# Patient Record
Sex: FEMALE | Race: WHITE | NOT HISPANIC OR LATINO
[De-identification: names, ages, dates, MRNs, and addresses within clinical notes are randomized per-mention and may not be internally consistent; named-entity substitution may affect disease eponyms.]

---

## 2019-08-30 ENCOUNTER — APPOINTMENT (OUTPATIENT)
Dept: UROGYNECOLOGY | Facility: CLINIC | Age: 69
End: 2019-08-30
Payer: MEDICARE

## 2019-08-30 VITALS
BODY MASS INDEX: 42.52 KG/M2 | SYSTOLIC BLOOD PRESSURE: 146 MMHG | HEIGHT: 63.75 IN | WEIGHT: 246 LBS | DIASTOLIC BLOOD PRESSURE: 70 MMHG

## 2019-08-30 DIAGNOSIS — R35.0 FREQUENCY OF MICTURITION: ICD-10-CM

## 2019-08-30 DIAGNOSIS — N39.41 URGE INCONTINENCE: ICD-10-CM

## 2019-08-30 DIAGNOSIS — Z86.59 PERSONAL HISTORY OF OTHER MENTAL AND BEHAVIORAL DISORDERS: ICD-10-CM

## 2019-08-30 DIAGNOSIS — Z87.09 PERSONAL HISTORY OF OTHER DISEASES OF THE RESPIRATORY SYSTEM: ICD-10-CM

## 2019-08-30 DIAGNOSIS — Z80.0 FAMILY HISTORY OF MALIGNANT NEOPLASM OF DIGESTIVE ORGANS: ICD-10-CM

## 2019-08-30 DIAGNOSIS — R35.1 NOCTURIA: ICD-10-CM

## 2019-08-30 DIAGNOSIS — Z86.39 PERSONAL HISTORY OF OTHER ENDOCRINE, NUTRITIONAL AND METABOLIC DISEASE: ICD-10-CM

## 2019-08-30 DIAGNOSIS — R39.15 URGENCY OF URINATION: ICD-10-CM

## 2019-08-30 DIAGNOSIS — Z86.69 PERSONAL HISTORY OF OTHER DISEASES OF THE NERVOUS SYSTEM AND SENSE ORGANS: ICD-10-CM

## 2019-08-30 DIAGNOSIS — Z87.19 PERSONAL HISTORY OF OTHER DISEASES OF THE DIGESTIVE SYSTEM: ICD-10-CM

## 2019-08-30 DIAGNOSIS — N39.3 STRESS INCONTINENCE (FEMALE) (MALE): ICD-10-CM

## 2019-08-30 DIAGNOSIS — Z86.018 PERSONAL HISTORY OF OTHER BENIGN NEOPLASM: ICD-10-CM

## 2019-08-30 PROBLEM — Z00.00 ENCOUNTER FOR PREVENTIVE HEALTH EXAMINATION: Status: ACTIVE | Noted: 2019-08-30

## 2019-08-30 LAB
BILIRUB UR QL STRIP: NEGATIVE
CLARITY UR: CLEAR
COLLECTION METHOD: NORMAL
GLUCOSE UR-MCNC: NEGATIVE
HCG UR QL: 0.2 EU/DL
HGB UR QL STRIP.AUTO: NEGATIVE
KETONES UR-MCNC: NEGATIVE
LEUKOCYTE ESTERASE UR QL STRIP: NORMAL
NITRITE UR QL STRIP: NEGATIVE
PH UR STRIP: 5.5
PROT UR STRIP-MCNC: NORMAL
SP GR UR STRIP: 1.02

## 2019-08-30 PROCEDURE — 81003 URINALYSIS AUTO W/O SCOPE: CPT | Mod: QW

## 2019-08-30 PROCEDURE — 51701 INSERT BLADDER CATHETER: CPT

## 2019-08-30 NOTE — OB HISTORY
[Definite ___ (Date)] : the last menstrual period was [unfilled] [Vaginal ___] : [unfilled] vaginal delivery(s) [Last Pap Smear ___] : date of last pap smear was on [unfilled] [Regular Cycle Intervals] : periods have been irregular [Sexually Active] : is not sexually active

## 2019-08-30 NOTE — PHYSICAL EXAM
[No Acute Distress] : in no acute distress [Well developed] : well developed [Well Nourished] : ~L well nourished [Oriented x3] : oriented to person, place, and time [Normal Memory] : ~T memory was ~L unimpaired [Normal Mood/Affect] : mood and affect are normal [Tenderness] : ~T no ~M abdominal tenderness observed [Distended] : not distended [Obese] : obese [None] : no CVA tenderness [Warm and Dry] : was warm and dry to touch [Inguinal LAD] : no adenopathy was noted in the inguinal lymph nodes [Normal Gait] : gait was abnormal [Vulvar Atrophy] : vulvar atrophy [Labia Minora] : were normal [Labia Majora] : were normal [No Bleeding] : there was no active vaginal bleeding [Normal Appearance] : general appearance was normal [2] : 2 [Ap ____] : Ap [unfilled] [Bp ____] : Bp [unfilled] [Soft] :  the cervix was soft [Post Void Residual ____ml] : post void residual via catheterization was [unfilled] ml [Normal] : no abnormalities

## 2019-08-30 NOTE — HISTORY OF PRESENT ILLNESS
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

## 2019-08-30 NOTE — DISCUSSION/SUMMARY
[FreeTextEntry1] : \par Lisette presents for urgency incontinence with history of MS. On exam small asymptomatic rectocele, normal PVR. We reviewed her symptoms and exam findings. We discussed management options for overactive bladder including observation, behavorial modifications and bladder training, physical therapy and medications including anticholinergics and beta 3 agonists. She already has dementia so we discussed myrbetriq if she decides on a medication. We discussed UDS due to history of MS. We discussed additional treatment options including sacral neuromodulation, PTNS and intra detrusor Botox. GIven myoma on MRI, recommend pelvic us and f/u with GYN (Dr. Rodriguez). [facility] handout on overactive bladder and bladder training was given to her.\par \par No urogynecologic contraindications to hip surgery pending negative urine culture\par \par [] u/a, culture\par [] bladder training\par [] sonogram\par [] UDS\par [] GYN referral\par \par

## 2019-09-03 ENCOUNTER — RESULT REVIEW (OUTPATIENT)
Age: 69
End: 2019-09-03

## 2019-09-04 LAB
APPEARANCE: CLEAR
BACTERIA UR CULT: NORMAL
BACTERIA: NEGATIVE
BILIRUBIN URINE: NEGATIVE
BLOOD URINE: NEGATIVE
COLOR: YELLOW
GLUCOSE QUALITATIVE U: NEGATIVE
HYALINE CASTS: 2 /LPF
KETONES URINE: NEGATIVE
LEUKOCYTE ESTERASE URINE: NEGATIVE
MICROSCOPIC-UA: NORMAL
NITRITE URINE: NEGATIVE
PH URINE: 6
PROTEIN URINE: NORMAL
RED BLOOD CELLS URINE: 3 /HPF
SPECIFIC GRAVITY URINE: 1.03
SQUAMOUS EPITHELIAL CELLS: 3 /HPF
UROBILINOGEN URINE: NORMAL
WHITE BLOOD CELLS URINE: 1 /HPF

## 2019-09-09 ENCOUNTER — APPOINTMENT (OUTPATIENT)
Dept: UROGYNECOLOGY | Facility: CLINIC | Age: 69
End: 2019-09-09

## 2022-05-12 ENCOUNTER — EMERGENCY (EMERGENCY)
Facility: HOSPITAL | Age: 72
LOS: 1 days | Discharge: HOME | End: 2022-05-12
Attending: EMERGENCY MEDICINE
Payer: MEDICARE

## 2022-05-12 VITALS
RESPIRATION RATE: 18 BRPM | OXYGEN SATURATION: 96 % | DIASTOLIC BLOOD PRESSURE: 78 MMHG | HEART RATE: 74 BPM | SYSTOLIC BLOOD PRESSURE: 120 MMHG

## 2022-05-12 VITALS
TEMPERATURE: 99 F | DIASTOLIC BLOOD PRESSURE: 73 MMHG | SYSTOLIC BLOOD PRESSURE: 108 MMHG | HEART RATE: 74 BPM | OXYGEN SATURATION: 97 % | RESPIRATION RATE: 18 BRPM

## 2022-05-12 DIAGNOSIS — F43.25 ADJUSTMENT DISORDER WITH MIXED DISTURBANCE OF EMOTIONS AND CONDUCT: ICD-10-CM

## 2022-05-12 DIAGNOSIS — F01.50 VASCULAR DEMENTIA WITHOUT BEHAVIORAL DISTURBANCE: ICD-10-CM

## 2022-05-12 LAB
ALBUMIN SERPL ELPH-MCNC: 3.3 G/DL — SIGNIFICANT CHANGE UP (ref 3.3–5.2)
ALP SERPL-CCNC: 63 U/L — SIGNIFICANT CHANGE UP (ref 40–120)
ALT FLD-CCNC: 22 U/L — SIGNIFICANT CHANGE UP
AMPHET UR-MCNC: NEGATIVE — SIGNIFICANT CHANGE UP
ANION GAP SERPL CALC-SCNC: 12 MMOL/L — SIGNIFICANT CHANGE UP (ref 5–17)
APAP SERPL-MCNC: <3 UG/ML — LOW (ref 10–26)
AST SERPL-CCNC: 28 U/L — SIGNIFICANT CHANGE UP
BARBITURATES UR SCN-MCNC: NEGATIVE — SIGNIFICANT CHANGE UP
BASOPHILS # BLD AUTO: 0.01 K/UL — SIGNIFICANT CHANGE UP (ref 0–0.2)
BASOPHILS NFR BLD AUTO: 0.1 % — SIGNIFICANT CHANGE UP (ref 0–2)
BENZODIAZ UR-MCNC: NEGATIVE — SIGNIFICANT CHANGE UP
BILIRUB SERPL-MCNC: 0.3 MG/DL — LOW (ref 0.4–2)
BUN SERPL-MCNC: 18.5 MG/DL — SIGNIFICANT CHANGE UP (ref 8–20)
CALCIUM SERPL-MCNC: 8.9 MG/DL — SIGNIFICANT CHANGE UP (ref 8.6–10.2)
CHLORIDE SERPL-SCNC: 102 MMOL/L — SIGNIFICANT CHANGE UP (ref 98–107)
CO2 SERPL-SCNC: 25 MMOL/L — SIGNIFICANT CHANGE UP (ref 22–29)
COCAINE METAB.OTHER UR-MCNC: NEGATIVE — SIGNIFICANT CHANGE UP
CREAT SERPL-MCNC: 0.7 MG/DL — SIGNIFICANT CHANGE UP (ref 0.5–1.3)
EGFR: 91 ML/MIN/1.73M2 — SIGNIFICANT CHANGE UP
EOSINOPHIL # BLD AUTO: 0.19 K/UL — SIGNIFICANT CHANGE UP (ref 0–0.5)
EOSINOPHIL NFR BLD AUTO: 2.7 % — SIGNIFICANT CHANGE UP (ref 0–6)
ETHANOL SERPL-MCNC: <10 MG/DL — SIGNIFICANT CHANGE UP (ref 0–9)
GLUCOSE SERPL-MCNC: 149 MG/DL — HIGH (ref 70–99)
HCT VFR BLD CALC: 36.5 % — SIGNIFICANT CHANGE UP (ref 34.5–45)
HGB BLD-MCNC: 11.7 G/DL — SIGNIFICANT CHANGE UP (ref 11.5–15.5)
IMM GRANULOCYTES NFR BLD AUTO: 0.3 % — SIGNIFICANT CHANGE UP (ref 0–1.5)
LYMPHOCYTES # BLD AUTO: 1.62 K/UL — SIGNIFICANT CHANGE UP (ref 1–3.3)
LYMPHOCYTES # BLD AUTO: 23.2 % — SIGNIFICANT CHANGE UP (ref 13–44)
MCHC RBC-ENTMCNC: 29.9 PG — SIGNIFICANT CHANGE UP (ref 27–34)
MCHC RBC-ENTMCNC: 32.1 GM/DL — SIGNIFICANT CHANGE UP (ref 32–36)
MCV RBC AUTO: 93.4 FL — SIGNIFICANT CHANGE UP (ref 80–100)
METHADONE UR-MCNC: NEGATIVE — SIGNIFICANT CHANGE UP
MONOCYTES # BLD AUTO: 0.43 K/UL — SIGNIFICANT CHANGE UP (ref 0–0.9)
MONOCYTES NFR BLD AUTO: 6.2 % — SIGNIFICANT CHANGE UP (ref 2–14)
NEUTROPHILS # BLD AUTO: 4.7 K/UL — SIGNIFICANT CHANGE UP (ref 1.8–7.4)
NEUTROPHILS NFR BLD AUTO: 67.5 % — SIGNIFICANT CHANGE UP (ref 43–77)
OPIATES UR-MCNC: NEGATIVE — SIGNIFICANT CHANGE UP
PCP SPEC-MCNC: SIGNIFICANT CHANGE UP
PCP UR-MCNC: NEGATIVE — SIGNIFICANT CHANGE UP
PLATELET # BLD AUTO: 225 K/UL — SIGNIFICANT CHANGE UP (ref 150–400)
POTASSIUM SERPL-MCNC: 4.4 MMOL/L — SIGNIFICANT CHANGE UP (ref 3.5–5.3)
POTASSIUM SERPL-SCNC: 4.4 MMOL/L — SIGNIFICANT CHANGE UP (ref 3.5–5.3)
PROT SERPL-MCNC: 6.4 G/DL — LOW (ref 6.6–8.7)
RBC # BLD: 3.91 M/UL — SIGNIFICANT CHANGE UP (ref 3.8–5.2)
RBC # FLD: 13.6 % — SIGNIFICANT CHANGE UP (ref 10.3–14.5)
SALICYLATES SERPL-MCNC: <0.6 MG/DL — LOW (ref 10–20)
SODIUM SERPL-SCNC: 139 MMOL/L — SIGNIFICANT CHANGE UP (ref 135–145)
THC UR QL: NEGATIVE — SIGNIFICANT CHANGE UP
WBC # BLD: 6.97 K/UL — SIGNIFICANT CHANGE UP (ref 3.8–10.5)
WBC # FLD AUTO: 6.97 K/UL — SIGNIFICANT CHANGE UP (ref 3.8–10.5)

## 2022-05-12 PROCEDURE — 85025 COMPLETE CBC W/AUTO DIFF WBC: CPT

## 2022-05-12 PROCEDURE — 99285 EMERGENCY DEPT VISIT HI MDM: CPT

## 2022-05-12 PROCEDURE — 80307 DRUG TEST PRSMV CHEM ANLYZR: CPT

## 2022-05-12 PROCEDURE — 36415 COLL VENOUS BLD VENIPUNCTURE: CPT

## 2022-05-12 PROCEDURE — 93005 ELECTROCARDIOGRAM TRACING: CPT

## 2022-05-12 PROCEDURE — 93010 ELECTROCARDIOGRAM REPORT: CPT

## 2022-05-12 PROCEDURE — 99284 EMERGENCY DEPT VISIT MOD MDM: CPT

## 2022-05-12 PROCEDURE — 80053 COMPREHEN METABOLIC PANEL: CPT

## 2022-05-12 PROCEDURE — 90792 PSYCH DIAG EVAL W/MED SRVCS: CPT

## 2022-05-12 PROCEDURE — 82962 GLUCOSE BLOOD TEST: CPT

## 2022-05-12 NOTE — ED BEHAVIORAL HEALTH ASSESSMENT NOTE - SUMMARY
Patient is a 72 year old, , female;  witn daughter, currently domiciled at [facility] and rehab facility (living there since November 15 2021-transferred several months ago to memory unit)  ; with  history of anxiety and depressive sx's, ; no known psychiatric  hospitalizations; no known suicide attempts; no known history of violence or arrests; no active substance abuse or h/o complicated withdrawal, , PMhx Multiple Sclerosis, h/o UTI, COPD, DM type II, obesity, primary HTN, hyperlipidemia, vascular dementia, hypothyroidism, adult failure to thrive, sent to ER by home for evaluation of possible suicidal ideation after being found attempting to cut her wrist with a butter knife.  Patient admits to cutting self with knife to ease tension. She denies any suicidal intent, and denies current S/H I/I/P. She reports frustration about her memory deficits.  No psychotic, or manic sx's were elicited.  She has been compliant with medications.  Dx with adjustment disorder with disturbance of emotion and conduct.  She feels safe to return home and during observation in ER and during interview appeared calm, cooperative and forthcoming. Will clear to return back home

## 2022-05-12 NOTE — ED ADULT TRIAGE NOTE - CHIEF COMPLAINT QUOTE
Patient from [facility] where staff reports suicidal ideation as well as attempting to cut her wrist with a butter knife.  patient w/ hx of dementia and catatonia per ems. dr. hernandez called to bedside for eval.

## 2022-05-12 NOTE — ED ADULT NURSE NOTE - NSIMPLEMENTINTERV_GEN_ALL_ED
Implemented All Fall Risk Interventions:  [place] to call system. Call bell, personal items and telephone within reach. Instruct patient to call for assistance. Room bathroom lighting operational. Non-slip footwear when patient is off stretcher. Physically safe environment: no spills, clutter or unnecessary equipment. Stretcher in lowest position, wheels locked, appropriate side rails in place. Provide visual cue, wrist band, yellow gown, etc. Monitor gait and stability. Monitor for mental status changes and reorient to person, place, and time. Review medications for side effects contributing to fall risk. Reinforce activity limits and safety measures with patient and family.

## 2022-05-12 NOTE — ED BEHAVIORAL HEALTH ASSESSMENT NOTE - HPI (INCLUDE ILLNESS QUALITY, SEVERITY, DURATION, TIMING, CONTEXT, MODIFYING FACTORS, ASSOCIATED SIGNS AND SYMPTOMS)
Patient is a 72 year old, , female;  with two daughters, currently domiciled at [facility] and rehab facility (living there since November 15 2021 ;  history of anxiety and depressive sx's, ; no psychiatric  hospitalizations; no known suicide attempts; no known history of violence or arrests; no active substance abuse, PMhx Multiple Sclerosis, h/o UTI, COPD, DM type II, obesity, primary HTN, hyperlipidemia, vascular dementia, hypothyroidism, adult failure to thrive, sent to ER by home for evaluation of possible suicidal ideation after being found attempting to cut her wrist with a butter knife and stating that she wanted to die.        She takes Klonopin 0.5 Q daily (increased to twice daily today), Ropinirole HCL 2 mg at night, Acetaminophen 650 Q 6 hours PRN for pain, Xyzal 5 mg daily,  Trazodone 100 mg Q HS, Fluoxetine 40 m g daily, Lisinopril 20 mg daily, Hydrochlorothiazide 25 mg daily, Methimazole 5 mg daily, Vitamin D 50,000 units once a week, Plegridy 125 mcg/0.5 ml IM One injection every 14 days       Called [facility] and rehab facility (unit 1N) to get collateral ([phone]).  Writer spoke with Malena bassett) who reported patient was moved on to the memory care unit for the last few months.  She reported that she does not know that patient well.   Patient has not been aggressive or agitated.  Reportedly last week  when she was in the dining room she passed out while eating and was thought to have a vaso vagal response.  Today patient  was attempting to cut her wrist with a butter knife and tried to cut her wrists.  Patient made statements that she wants to die today. Patient has been eating, sleeping and compliant with her medications.   She has not made any similar statements prior in the last Patient is a 72 year old, , female;  witn daughter, currently domiciled at [facility] and rehab facility (living there since November 15 2021-transferred several months ago to memory unit)  ; with  history of anxiety and depressive sx's, ; no known psychiatric  hospitalizations; no known suicide attempts; no known history of violence or arrests; no active substance abuse or h/o complicated withdrawal, , PMhx Multiple Sclerosis, h/o UTI, COPD, DM type II, obesity, primary HTN, hyperlipidemia, vascular dementia, hypothyroidism, adult failure to thrive, sent to ER by home for evaluation of possible suicidal ideation after being found attempting to cut her wrist with a butter knife.      On interview, patient was calm, pleasant and appeared forthcoming, and laying in stretcher.  Patient admitted that she cut her self with a butter knife to "ease tension".  She reported that she does get upset about her memory problems and gets frustrated. He stated "I wasn't trying to kill myself....not with a butter knife".   She admits that she does not know what exactly was going on with her head at that moment.  She states she has a wonderful family that cares for her and the staff at home take good care of her as well. she does not feel that she would try to end her life. She admits that she has had suicidal thoughts in the past but not recently.  She also concedes that she may have made a statement that she wanted to die althought she does not remember at this time.       She states she has been dealing with depression for most of her adult life.  She states that she has been taking her mediations and has been eating and sleeping well.   When asked about root of her depression she states "nothing in particular".   She does admit that her biggest problem is her memory issues.  She often does not remember conversation or people's names.  She states she knows that she in the hospital. She is able to correctly state her age (72) but states she does not remember month or year.   She states the name of the president is "that old rocco". She states she can picture him but cannot recall his name. She is able to repeat 3/3 words immediately but 0/3 after 5 minutes. She has difficulty spelling WORLD backwards.  She is able to spell WORLD forwards and is able to spell the first three letters backwards before saying "I can't do it".   She reports she feels safe going back to home and does not feel that she would hurt herself. She admits that she wants her brain to function the way that is used to and enjoy life like everybody else.     Concerning other psychiatric symptoms, pt denies  any aggressive or violent behavior towards others. Pt denies any episodes of bizarre happiness, unusual energy, unusual nightime excitation or other common symptoms of sameera. Pt denies hearing voices or seeing things.  No delusions were elicited.   She denies panic attacks.         Called [facility] and rehab facility (unit 1N) to get collateral ([phone]).  Writer spoke with Malena bassett) who reported patient was moved on to the memory care unit for the last few months.  She reported that she does not know that patient well but denies patient has been engaging in unusual behavior prior to today.  She usually keeps to herself and has good appetite.    Patient has not been aggressive or agitated.  Reportedly last week  when she was in the dining room she passed out while eating and was thought to have a vaso vagal response.  Today patient  was attempting to cut her wrist with a butter knife and was sent for evaluation. She was noted to have a red gretchen but did not break skin. .  Patient also reportedly made statements that she wants to die today. She has been taking her medications.    She has not made any similar statements since she has known her.  She takes Klonopin 0.5 Q daily (increased to twice daily today), Ropinirole HCL 2 mg at night, Acetaminophen 650 Q 6 hours PRN for pain, Xyzal 5 mg daily,  Trazodone 100 mg Q HS, Fluoxetine 40 m g daily, Lisinopril 20 mg daily, Hydrochlorothiazide 25 mg daily, Methimazole 5 mg daily, Vitamin D 50,000 units once a week, Plegridy 125 mcg/0.5 ml IM One injection every 14 days. She has not been a behavioral problem.

## 2022-05-12 NOTE — ED BEHAVIORAL HEALTH ASSESSMENT NOTE - SAFETY PLAN ADDT'L DETAILS
Safety plan discussed with.../Provision of National Suicide Prevention Lifeline 8-105-363-TALK (6886)

## 2022-05-12 NOTE — CHART NOTE - NSCHARTNOTEFT_GEN_A_CORE
SW Note: SW alerted by behavioral health staff that patient is psychiatrically cleared to return back to [facility]. SW contacted ED provider who reports that pt is medically cleared for return. SW reached out to [facility] ([place]) to confirm that pt will be able to return tonight. [place] stated that D/C paperwork can be left with pt upon arrival. Transport set up with [facility] ([place]) for soonest available pepito. [facility] uploaded and transport letter left with pt at bedside. No other acute SW needs at this moment.

## 2022-05-12 NOTE — ED PROVIDER NOTE - PHYSICAL EXAMINATION
Alert, lucid, and in no apparent distress. Pt is normocephalic, atraumatic.  Pupils are equal,  lips pink, moist mucous membranes, tongue midline. Neck supple.   Lungs clear to auscultation. Heart regular rate and rhythm, normal S1, S2, no murmurs, gallops, rubs.  Abdomen is soft, nontender, no pulsatile mass, no masses, no distension, no rebound. No CVA Tenderness, no suprapubic tenderness.   Non-focal sensory, 5 out of 5 motor strength, no dysmetria, fluent, goal directed speech. CN2 to 12 intact. Skin without rash,   Normal mentation, does not appear agitated

## 2022-05-12 NOTE — ED ADULT NURSE NOTE - OBJECTIVE STATEMENT
pt presents to ED from [facility] after pt was reported to be cutting wrists. no wounds noted. pt drowsy and refusing to speak at times. A7Ox2. hx of dementia at baseline. pt changed to gown. cleaned and placed on purewick with SNA assistance. 1:1 remains at bedside.

## 2022-05-12 NOTE — ED BEHAVIORAL HEALTH ASSESSMENT NOTE - DESCRIPTION
see HPI Patient was pleasant, cooperative and did not appear to be responding to internal stimuli.  She was not aggressive or agitated.  She denied any S/H I/I/P and responded well to support.     ICU Vital Signs Last 24 Hrs  T(C): 37 (12 May 2022 14:30), Max: 37 (12 May 2022 14:30)  T(F): 98.6 (12 May 2022 14:30), Max: 98.6 (12 May 2022 14:30)  HR: 74 (12 May 2022 21:37) (74 - 74)  BP: 120/78 (12 May 2022 21:37) (108/73 - 120/78)  BP(mean): --  ABP: --  ABP(mean): --  RR: 18 (12 May 2022 21:37) (18 - 18)  SpO2: 96% (12 May 2022 21:37) (96% - 97%)  with adult daughter. Has worked as a book keeper and MedIcal assistant

## 2022-05-12 NOTE — ED ADULT NURSE NOTE - CHIEF COMPLAINT QUOTE
Patient from [facility] where staff reports suicidal ideation as well as attempting to cut her wrist with a butter knife.  patient w/ hx of dementia and catatonia per ems. dr. hernandez called to bedside for eval.

## 2022-05-12 NOTE — ED BEHAVIORAL HEALTH ASSESSMENT NOTE - RISK ASSESSMENT
Low:  Patient felt stressed due to her cognitive deficits, denies suicidal intent or plan,  has strong reason for living, supportive family, has been compliant with tx and in a supportive environment, no h/o substance use, remains future oriented.  Patient memory problems are ongoing stressor. Low Acute Suicide Risk

## 2022-05-12 NOTE — ED PROVIDER NOTE - OBJECTIVE STATEMENT
71 yo male pmh dementia comes to ed with gesture of cutting herself with a butter knife;  pt noted feels depressed;  as per chart , pt is DNR/DNI, comfort care and no hospitalization  on MOLST ;

## 2022-05-12 NOTE — ED BEHAVIORAL HEALTH ASSESSMENT NOTE - DETAILS
spoke with nurse from home NA Please go to Nearest ER or call 911, If you notice any of the followin) Agitation, Aggression or Anxiety,    2) Suicidal or homicidal thoughts 3) Worsening of symptoms or 4) Side effects of medications

## 2022-05-18 ENCOUNTER — INPATIENT (INPATIENT)
Facility: HOSPITAL | Age: 72
LOS: 0 days | Discharge: NURSING, REHAB OR LONG-TERM CARE | End: 2022-05-19
Attending: INTERNAL MEDICINE | Admitting: INTERNAL MEDICINE
Payer: MEDICARE

## 2022-05-18 ENCOUNTER — TRANSCRIPTION ENCOUNTER (OUTPATIENT)
Age: 72
End: 2022-05-18

## 2022-05-18 VITALS
HEART RATE: 55 BPM | OXYGEN SATURATION: 99 % | RESPIRATION RATE: 18 BRPM | DIASTOLIC BLOOD PRESSURE: 71 MMHG | SYSTOLIC BLOOD PRESSURE: 132 MMHG | TEMPERATURE: 98 F

## 2022-05-18 DIAGNOSIS — F33.2 MAJOR DEPRESSIVE DISORDER, RECURRENT SEVERE WITHOUT PSYCHOTIC FEATURES: ICD-10-CM

## 2022-05-18 DIAGNOSIS — R45.851 SUICIDAL IDEATIONS: ICD-10-CM

## 2022-05-18 DIAGNOSIS — F01.50 VASCULAR DEMENTIA WITHOUT BEHAVIORAL DISTURBANCE: ICD-10-CM

## 2022-05-18 LAB
ALBUMIN SERPL ELPH-MCNC: 4.1 G/DL — SIGNIFICANT CHANGE UP (ref 3.3–5)
ALP SERPL-CCNC: 73 U/L — SIGNIFICANT CHANGE UP (ref 40–120)
ALT FLD-CCNC: 19 U/L — SIGNIFICANT CHANGE UP (ref 4–33)
ANION GAP SERPL CALC-SCNC: 10 MMOL/L — SIGNIFICANT CHANGE UP (ref 7–14)
APAP SERPL-MCNC: <10 UG/ML — LOW (ref 15–25)
AST SERPL-CCNC: 23 U/L — SIGNIFICANT CHANGE UP (ref 4–32)
BASOPHILS # BLD AUTO: 0.01 K/UL — SIGNIFICANT CHANGE UP (ref 0–0.2)
BASOPHILS NFR BLD AUTO: 0.2 % — SIGNIFICANT CHANGE UP (ref 0–2)
BILIRUB SERPL-MCNC: 0.3 MG/DL — SIGNIFICANT CHANGE UP (ref 0.2–1.2)
BUN SERPL-MCNC: 22 MG/DL — SIGNIFICANT CHANGE UP (ref 7–23)
CALCIUM SERPL-MCNC: 9.4 MG/DL — SIGNIFICANT CHANGE UP (ref 8.4–10.5)
CHLORIDE SERPL-SCNC: 102 MMOL/L — SIGNIFICANT CHANGE UP (ref 98–107)
CO2 SERPL-SCNC: 29 MMOL/L — SIGNIFICANT CHANGE UP (ref 22–31)
CREAT SERPL-MCNC: 0.74 MG/DL — SIGNIFICANT CHANGE UP (ref 0.5–1.3)
EGFR: 85 ML/MIN/1.73M2 — SIGNIFICANT CHANGE UP
EOSINOPHIL # BLD AUTO: 0.17 K/UL — SIGNIFICANT CHANGE UP (ref 0–0.5)
EOSINOPHIL NFR BLD AUTO: 2.8 % — SIGNIFICANT CHANGE UP (ref 0–6)
ETHANOL SERPL-MCNC: <10 MG/DL — SIGNIFICANT CHANGE UP
GLUCOSE SERPL-MCNC: 89 MG/DL — SIGNIFICANT CHANGE UP (ref 70–99)
HCT VFR BLD CALC: 40.1 % — SIGNIFICANT CHANGE UP (ref 34.5–45)
HGB BLD-MCNC: 12.7 G/DL — SIGNIFICANT CHANGE UP (ref 11.5–15.5)
IANC: 3.59 K/UL — SIGNIFICANT CHANGE UP (ref 1.8–7.4)
IMM GRANULOCYTES NFR BLD AUTO: 0.5 % — SIGNIFICANT CHANGE UP (ref 0–1.5)
LYMPHOCYTES # BLD AUTO: 1.72 K/UL — SIGNIFICANT CHANGE UP (ref 1–3.3)
LYMPHOCYTES # BLD AUTO: 28.1 % — SIGNIFICANT CHANGE UP (ref 13–44)
MCHC RBC-ENTMCNC: 29.3 PG — SIGNIFICANT CHANGE UP (ref 27–34)
MCHC RBC-ENTMCNC: 31.7 GM/DL — LOW (ref 32–36)
MCV RBC AUTO: 92.6 FL — SIGNIFICANT CHANGE UP (ref 80–100)
MONOCYTES # BLD AUTO: 0.6 K/UL — SIGNIFICANT CHANGE UP (ref 0–0.9)
MONOCYTES NFR BLD AUTO: 9.8 % — SIGNIFICANT CHANGE UP (ref 2–14)
NEUTROPHILS # BLD AUTO: 3.59 K/UL — SIGNIFICANT CHANGE UP (ref 1.8–7.4)
NEUTROPHILS NFR BLD AUTO: 58.6 % — SIGNIFICANT CHANGE UP (ref 43–77)
NRBC # BLD: 0 /100 WBCS — SIGNIFICANT CHANGE UP
NRBC # FLD: 0 K/UL — SIGNIFICANT CHANGE UP
PLATELET # BLD AUTO: 239 K/UL — SIGNIFICANT CHANGE UP (ref 150–400)
POTASSIUM SERPL-MCNC: 4.2 MMOL/L — SIGNIFICANT CHANGE UP (ref 3.5–5.3)
POTASSIUM SERPL-SCNC: 4.2 MMOL/L — SIGNIFICANT CHANGE UP (ref 3.5–5.3)
PROT SERPL-MCNC: 7.1 G/DL — SIGNIFICANT CHANGE UP (ref 6–8.3)
RBC # BLD: 4.33 M/UL — SIGNIFICANT CHANGE UP (ref 3.8–5.2)
RBC # FLD: 14 % — SIGNIFICANT CHANGE UP (ref 10.3–14.5)
SALICYLATES SERPL-MCNC: <0.3 MG/DL — LOW (ref 15–30)
SODIUM SERPL-SCNC: 141 MMOL/L — SIGNIFICANT CHANGE UP (ref 135–145)
TOXICOLOGY SCREEN, DRUGS OF ABUSE, SERUM RESULT: SIGNIFICANT CHANGE UP
TSH SERPL-MCNC: 0.46 UIU/ML — SIGNIFICANT CHANGE UP (ref 0.27–4.2)
WBC # BLD: 6.12 K/UL — SIGNIFICANT CHANGE UP (ref 3.8–10.5)
WBC # FLD AUTO: 6.12 K/UL — SIGNIFICANT CHANGE UP (ref 3.8–10.5)

## 2022-05-18 PROCEDURE — 70450 CT HEAD/BRAIN W/O DYE: CPT | Mod: 26,MA

## 2022-05-18 PROCEDURE — 99285 EMERGENCY DEPT VISIT HI MDM: CPT | Mod: FS

## 2022-05-18 NOTE — ED BEHAVIORAL HEALTH ASSESSMENT NOTE - DETAILS
NA n/a email daughter aware denies previous attempts. States he has been trying to think of ways but does not think she has access to anythingl became slightly paranoid on Wellbutrin.

## 2022-05-18 NOTE — ED BEHAVIORAL HEALTH ASSESSMENT NOTE - RISK ASSESSMENT
Low:  Patient felt stressed due to her cognitive deficits, denies suicidal intent or plan,  has strong reason for living, supportive family, has been compliant with tx and in a supportive environment, no h/o substance use, remains future oriented.  Patient memory problems are ongoing stressor. Low Acute Suicide Risk high- current suicidal ideation, unable to engage in safety planning, questionable past suicide attempt, loss of independence, isolation   protective factors- supportive family, compliance with medications, High Acute Suicide Risk

## 2022-05-18 NOTE — ED BEHAVIORAL HEALTH ASSESSMENT NOTE - DIFFERENTIAL
Adjustment disorder vs depressive disorder vs due to [facility] major depressive disorder vs adjustment disorder

## 2022-05-18 NOTE — ED BEHAVIORAL HEALTH ASSESSMENT NOTE - PSYCHIATRIC ISSUES AND PLAN (INCLUDE STANDING AND PRN MEDICATION)
Ativan 1 mg po/im prn agitation Ativan 1 mg po/im prn agitation Continue  Prozac 40 mg daily, Klonopin 0.5mg daily , and Trazodone 100mg hs

## 2022-05-18 NOTE — ED BEHAVIORAL HEALTH ASSESSMENT NOTE - ADDITIONAL DETAILS ALL
states, " If I could find a way to kill myself I would." Cut wrist with a butter knife last week. Patient does not remember this.

## 2022-05-18 NOTE — ED BEHAVIORAL HEALTH ASSESSMENT NOTE - HPI (INCLUDE ILLNESS QUALITY, SEVERITY, DURATION, TIMING, CONTEXT, MODIFYING FACTORS, ASSOCIATED SIGNS AND SYMPTOMS)
Patient is a 72 year old, , female;  witn daughter, currently domiciled at [facility] and rehab facility (living there since November 15 2021-transferred several months ago to memory unit)  ; with  history of anxiety and depressive sx's, ; no known psychiatric  hospitalizations; no known suicide attempts; no known history of violence or arrests; no active substance abuse or h/o complicated withdrawal, , PMhx Multiple Sclerosis, h/o UTI, COPD, DM type II, obesity, primary HTN, hyperlipidemia, vascular dementia, hypothyroidism, adult failure to thrive, sent to ER by home for evaluation of possible suicidal ideation after being found attempting to cut her wrist with a butter knife.      On interview, patient was calm, pleasant and appeared forthcoming, and laying in stretcher.  Patient admitted that she cut her self with a butter knife to "ease tension".  She reported that she does get upset about her memory problems and gets frustrated. He stated "I wasn't trying to kill myself....not with a butter knife".   She admits that she does not know what exactly was going on with her head at that moment.  She states she has a wonderful family that cares for her and the staff at home take good care of her as well. she does not feel that she would try to end her life. She admits that she has had suicidal thoughts in the past but not recently.  She also concedes that she may have made a statement that she wanted to die althought she does not remember at this time.       She states she has been dealing with depression for most of her adult life.  She states that she has been taking her mediations and has been eating and sleeping well.   When asked about root of her depression she states "nothing in particular".   She does admit that her biggest problem is her memory issues.  She often does not remember conversation or people's names.  She states she knows that she in the hospital. She is able to correctly state her age (72) but states she does not remember month or year.   She states the name of the president is "that old rocco". She states she can picture him but cannot recall his name. She is able to repeat 3/3 words immediately but 0/3 after 5 minutes. She has difficulty spelling WORLD backwards.  She is able to spell WORLD forwards and is able to spell the first three letters backwards before saying "I can't do it".   She reports she feels safe going back to home and does not feel that she would hurt herself. She admits that she wants her brain to function the way that is used to and enjoy life like everybody else.     Concerning other psychiatric symptoms, pt denies  any aggressive or violent behavior towards others. Pt denies any episodes of bizarre happiness, unusual energy, unusual nightime excitation or other common symptoms of sameera. Pt denies hearing voices or seeing things.  No delusions were elicited.   She denies panic attacks.         Called [facility] and rehab facility (unit 1N) to get collateral ([phone]).  Writer spoke with Malena bassett) who reported patient was moved on to the memory care unit for the last few months.  She reported that she does not know that patient well but denies patient has been engaging in unusual behavior prior to today.  She usually keeps to herself and has good appetite.    Patient has not been aggressive or agitated.  Reportedly last week  when she was in the dining room she passed out while eating and was thought to have a vaso vagal response.  Today patient  was attempting to cut her wrist with a butter knife and was sent for evaluation. She was noted to have a red gretchen but did not break skin. .  Patient also reportedly made statements that she wants to die today. She has been taking her medications.    She has not made any similar statements since she has known her.  She takes Klonopin 0.5 Q daily (increased to twice daily today), Ropinirole HCL 2 mg at night, Acetaminophen 650 Q 6 hours PRN for pain, Xyzal 5 mg daily,  Trazodone 100 mg Q HS, Fluoxetine 40 m g daily, Lisinopril 20 mg daily, Hydrochlorothiazide 25 mg daily, Methimazole 5 mg daily, Vitamin D 50,000 units once a week, Plegridy 125 mcg/0.5 ml IM One injection every 14 days. She has not been a behavioral problem. Patient is a 72 year old, , female;  with daughter, currently domiciled at [facility] and rehab facility (living there since November 15 2021-transferred several months ago to memory unit)  ; with  history of major depressive disorder and anxiety, ; no known psychiatric  hospitalizations; questionable suicide attempt 5/12/22; no known history of violence or arrests; no active substance abuse or h/o complicated withdrawal, , PMhx Multiple Sclerosis, h/o UTI, COPD, DM type II, obesity, primary HTN, hyperlipidemia, vascular dementia, hypothyroidism, adult failure to thrive, sent to ER from NH for suicidal ideation.   Patient is A&O x 1 ( knows she is in the hospital). She reports a long h/o depression with worsening symptoms the past several months in the context of loss of independence and move to a long term skilled nursing facility. Patient is not able to remember the name of the facility but stated, " I hate it there." Patient currently  endorses symptoms of depression including depressed mood every day throughout the day, poor appetite, low energy, poor concentration and memory, hypersomnia and suicidal ideation. Patient reports if she could find a way to kill herself she would but has not been able to find a way.  Patient does not like living in the nursing home and does not see herself getting any better. She reports she use to be able to ambulate with a walker and is now wheel chair bound. Patient states she would miss her family if she killed herself but states she has experienced extreme loss in the past and knows they will recover.   As far as other psychiatric symptoms, patient denies anxiety, and denies any current or recent auditory/visual hallucinations, thoughts of paranoia or ideas of reference. She also denies symptoms of sameera.  Of note, patient was seen in the ED at [facility] 5/12/22 after attempting to cut wrist with a butter knife.  Patient was evaluated by psychiatry and discharged back to her residence. She was seen today at [facility] by her psychiatrist and reported suicidal ideation.   .     See  note for collateral. Patient is a 72 year old, , female;  with daughter, currently domiciled at [facility] and rehab facility (living there since November 15 2021-transferred several months ago to memory unit)  ; with  history of major depressive disorder and anxiety, ; no known psychiatric  hospitalizations; questionable suicide attempt 5/12/22; no known history of violence or arrests; no active substance abuse or h/o complicated withdrawal, , PMhx Multiple Sclerosis, h/o UTI, COPD, DM type II, obesity, primary HTN, hyperlipidemia, vascular dementia, hypothyroidism, adult failure to thrive, sent to ER from NH for suicidal ideation.   Patient is A&O x 1 ( knows she is in the hospital). She reports a long h/o depression with worsening symptoms the past several months in the context of loss of independence and move to a long term skilled nursing facility. Patient is not able to remember the name of the facility but stated, " I hate it there." Patient currently  endorses symptoms of depression including depressed mood every day throughout the day, poor appetite, low energy, poor concentration and memory, hypersomnia and suicidal ideation. Patient reports if she could find a way to kill herself she would but has not been able to find a way.  Patient does not like living in the nursing home and does not see herself getting any better. She reports she use to be able to ambulate with a walker and is now wheel chair bound. Patient states she would miss her family if she killed herself but states she has experienced extreme loss in the past and knows they will recover.   As far as other psychiatric symptoms, patient denies anxiety, and denies any current or recent auditory/visual hallucinations, thoughts of paranoia or ideas of reference. She also denies symptoms of sameera.  Of note, patient was seen in the ED at [facility] 5/12/22 after attempting to cut wrist with a butter knife.  Patient was evaluated by psychiatry and discharged back to her residence. She was seen today at [facility] by her psychiatrist and reported suicidal ideation.     Received professional collateral from [facility]'s psychiatrist Dr. Alexander [phone]. Patient was placed on constant observation s/p discharge from [facility] pending visit with Dr. Alexander. Today patient was seen by Dr. Alexander and verbalized active suicidal ideation. Patient's depressive symptoms have worsened in the past week. Previously patent would engage in activities and appeared to enjoy visits with her . Dr. Alexander suspects patient's family recently told her she is not returning home and this triggered her worsening symptoms. Patient is currently prescribed Prozac 40 mg daily, Klonopin0.5mg daily and Trazodone 100 mg hs.   .     See  note for personal collateral. Patient is a 72 year old, , female;  with daughter, currently domiciled at [facility] and rehab facility (living there since November 15 2021-transferred several months ago to memory unit)  ; with  history of major depressive disorder and anxiety, ; no known psychiatric  hospitalizations; questionable suicide attempt 5/12/22; no known history of violence or arrests; no active substance abuse or h/o complicated withdrawal, , PMhx Multiple Sclerosis, h/o UTI, COPD, DM type II, obesity, primary HTN, hyperlipidemia, vascular dementia, hypothyroidism, adult failure to thrive, sent to ER from NH for suicidal ideation.     Patient is A&O x 2 ( knows she is in the hospital). She reports a long h/o depression with worsening symptoms the past several months in the context of loss of independence and move to a long term skilled nursing facility. Patient is not able to remember the name of the facility but stated, " I hate it there." Patient currently  endorses symptoms of depression including depressed mood every day throughout the day, poor appetite, low energy, poor concentration and memory, hypersomnia and suicidal ideation. Patient reports if she could find a way to kill herself she would but has not been able to find a way.  Patient does not like living in the nursing home and does not see herself getting any better. She reports she use to be able to ambulate with a walker and is now wheel chair bound. Patient states she would miss her family if she killed herself but states she has experienced extreme loss in the past and knows they will recover.     As far as other psychiatric symptoms, patient denies anxiety, and denies any current or recent auditory/visual hallucinations, thoughts of paranoia or ideas of reference. She also denies symptoms of sameera.  Of note, patient was seen in the ED at [facility] 5/12/22 after attempting to cut wrist with a butter knife.  Patient was evaluated by psychiatry and discharged back to her residence. She was seen today at [facility] by her psychiatrist and reported suicidal ideation.     Received professional collateral from patient's psychiatrist Dr. Alexander [phone]. Patient was placed on constant observation s/p discharge from [facility] pending visit with Dr. Alexander. Today patient was seen by Dr. Alexander and verbalized active suicidal ideation. Patient's depressive symptoms have worsened in the past week. Previously patent would engage in activities and appeared to enjoy visits with her . Dr. Alexander suspects patient's family recently told her she is not returning home and this triggered her worsening symptoms. Patient is currently prescribed Prozac 40 mg daily, Klonopin0.5mg daily and Trazodone 100 mg hs.   .     See  note for personal collateral.

## 2022-05-18 NOTE — ED PROVIDER NOTE - NSICDXPASTMEDICALHX_GEN_ALL_CORE_FT
PAST MEDICAL HISTORY:  Diabetes     Hypertension     Hypothyroidism     Major depression     Multiple sclerosis

## 2022-05-18 NOTE — ED BEHAVIORAL HEALTH ASSESSMENT NOTE - CASE SUMMARY
Patient is a 72 year old, , female;  with daughter, currently domiciled at [facility] and rehab facility (living there since November 15 2021-transferred several months ago to memory unit)  ; with  history of major depressive disorder and anxiety, ; no known psychiatric  hospitalizations; questionable suicide attempt 5/12/22; no known history of violence or arrests; no active substance abuse or h/o complicated withdrawal, , PMhx Multiple Sclerosis, h/o UTI, COPD, DM type II, obesity, primary HTN, hyperlipidemia, vascular dementia, hypothyroidism, adult failure to thrive, sent to ER from NH for suicidal ideation.   Patient presenting with worsening suicidal ideation in the context of worsening health, social isolation and current housing (nursing home). She admits to have been searching for means to harm herself, recently engaged in NSSIB and has been on a 1:1. Mood continues to worsen despite adherence to medication.   Pt requires psychiatric hospitalization but given frailty of medical needs, she will require med admission pending psych bed.   Pt cannot leave AMA.

## 2022-05-18 NOTE — ED ADULT NURSE NOTE - CHIEF COMPLAINT QUOTE
Pt VINNY from [facility], non-ambulatory; c/o suicidal ideation x 5 days, was d/c'd from [facility] 5 days ago but when interviewed by Psych at [facility] today they felt she needs a further Psych eval and admission for SI.  Pt lethargic and answers with only grunts; fs glu 96  Pt has DNR/DNI/ and DNH however [facility]'s policy is to send to hospital if SI

## 2022-05-18 NOTE — ED ADULT NURSE NOTE - OBJECTIVE STATEMENT
Pt awake, alert and oriented x 2 presents from nursing home for depression and suicidal thoughts with PMH depression.   Pt nonambulatory at baseline, pt incontinent.    cleaned, changed and repositioned, no skin breakdown noted.   Respirations even and unlabored.    Calm and cooperative on exam but reports feeling depressed.    Pt denies chest pain, SOB, n/v/d.  EKG being performed; awaiting IV and blood work.

## 2022-05-18 NOTE — ED PROVIDER NOTE - CLINICAL SUMMARY MEDICAL DECISION MAKING FREE TEXT BOX
71 y/o female with pmhx of depression, SI, hypothyroidism, DM, obesity, HTN, HLD, MS, COPD, UTI, DNR and DNI, presents from nursing home for depression and SI. Pt states she doesn't like the nursing home she lives at and doesn't like to be told what to do or what to eat. States if she didn't live there, and live outside normally she would use a gun to kill herself. Pt denies HI. Sent to ED for pysch evaluation. Pt eating and sleeping okay. pt depressed on exam. lungs clear, abd soft, NT. plan to check labs, ua, tsh, CT head, consult psych. pt placed on 1:1.

## 2022-05-18 NOTE — ED BEHAVIORAL HEALTH ASSESSMENT NOTE - DESCRIPTION
Patient was pleasant, cooperative and did not appear to be responding to internal stimuli.  She was not aggressive or agitated.  She denied any S/H I/I/P and responded well to support.     ICU Vital Signs Last 24 Hrs  T(C): 37 (12 May 2022 14:30), Max: 37 (12 May 2022 14:30)  T(F): 98.6 (12 May 2022 14:30), Max: 98.6 (12 May 2022 14:30)  HR: 74 (12 May 2022 21:37) (74 - 74)  BP: 120/78 (12 May 2022 21:37) (108/73 - 120/78)  BP(mean): --  ABP: --  ABP(mean): --  RR: 18 (12 May 2022 21:37) (18 - 18)  SpO2: 96% (12 May 2022 21:37) (96% - 97%)  with adult daughter. 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 Eyes closed for most of interview.   Vital Signs Last 24 Hrs  T(C): 36.6 (18 May 2022 16:54), Max: 36.6 (18 May 2022 13:36)  T(F): 97.9 (18 May 2022 16:54), Max: 97.9 (18 May 2022 13:36)  HR: 66 (18 May 2022 16:54) (55 - 66)  BP: 128/65 (18 May 2022 16:54) (128/65 - 132/71)  BP(mean): --  RR: 18 (18 May 2022 16:54) (18 - 18)  SpO2: 100% (18 May 2022 16:54) (99% - 100%)

## 2022-05-18 NOTE — ED BEHAVIORAL HEALTH ASSESSMENT NOTE - SUMMARY
Patient is a 72 year old, , female;  witn daughter, currently domiciled at [facility] and rehab facility (living there since November 15 2021-transferred several months ago to memory unit)  ; with  history of anxiety and depressive sx's, ; no known psychiatric  hospitalizations; no known suicide attempts; no known history of violence or arrests; no active substance abuse or h/o complicated withdrawal, , PMhx Multiple Sclerosis, h/o UTI, COPD, DM type II, obesity, primary HTN, hyperlipidemia, vascular dementia, hypothyroidism, adult failure to thrive, sent to ER by home for evaluation of possible suicidal ideation after being found attempting to cut her wrist with a butter knife.  Patient admits to cutting self with knife to ease tension. She denies any suicidal intent, and denies current S/H I/I/P. She reports frustration about her memory deficits.  No psychotic, or manic sx's were elicited.  She has been compliant with medications.  Dx with adjustment disorder with disturbance of emotion and conduct.  She feels safe to return home and during observation in ER and during interview appeared calm, cooperative and forthcoming. Will clear to return back home Patient is a 72 year old, , female;  with daughter, currently domiciled at [facility] and rehab facility (living there since November 15 2021-transferred several months ago to memory unit)  ; with  history of major depressive disorder and anxiety, ; no known psychiatric  hospitalizations; questionable suicide attempt 5/12/22; no known history of violence or arrests; no active substance abuse or h/o complicated withdrawal, , PMhx Multiple Sclerosis, h/o UTI, COPD, DM type II, obesity, primary HTN, hyperlipidemia, vascular dementia, hypothyroidism, adult failure to thrive, sent to ER from NH for suicidal ideation.   Patient seen and evaluated. She presents depressed with active suicidal ideation and is not able to engage in safety planning.  Although patient has a long h/o depression, her current symptoms represent a change from baseline from which the patient cannot be reasonably expected to improve with current level of care. The patient presents with risk requiring inpatient psychiatric hospitalization for safety and stabilization. At this time there are no available psychiatric jr-beds and patient will be admitted to medicine and followed by CL.  Recommend  continue constant observation  continue Prozac, Klonopin, Ropinirole, and Trazodone as ordered   CL will follow patient on medicine. Patient is a 72 year old, , female;  with daughter, currently domiciled at [facility] and rehab facility (living there since November 15 2021-transferred several months ago to memory unit)  ; with  history of major depressive disorder and anxiety, ; no known psychiatric  hospitalizations; questionable suicide attempt 5/12/22; no known history of violence or arrests; no active substance abuse or h/o complicated withdrawal, , PMhx Multiple Sclerosis, h/o UTI, COPD, DM type II, obesity, primary HTN, hyperlipidemia, vascular dementia, hypothyroidism, adult failure to thrive, sent to ER from NH for suicidal ideation.   Patient seen and evaluated. She presents depressed with active suicidal ideation and is not able to engage in safety planning.  Although patient has a long h/o depression, her current symptoms represent a change from baseline from which the patient cannot be reasonably expected to improve with current level of care. The patient presents with risk requiring inpatient psychiatric hospitalization for safety and stabilization. At this time there are no available psychiatric jr-beds and patient will be admitted to medicine and followed by CL.  Recommend  continue constant observation  continue Prozac 40 mg daily, Klonopin 0.5mg daily , and Trazodone 100mg hs   CL will follow patient on medicine. Patient is a 72 year old, , female;  with daughter, currently domiciled at [facility] and rehab facility (living there since November 15 2021-transferred several months ago to memory unit)  ; with  history of major depressive disorder and anxiety, ; no known psychiatric  hospitalizations; questionable suicide attempt 5/12/22; no known history of violence or arrests; no active substance abuse or h/o complicated withdrawal, , PMhx Multiple Sclerosis, h/o UTI, COPD, DM type II, obesity, primary HTN, hyperlipidemia, vascular dementia, hypothyroidism, adult failure to thrive, sent to ER from NH for suicidal ideation.   Patient seen and evaluated. She presents depressed with active suicidal ideation and is not able to engage in safety planning.  Although patient has a long h/o depression, her current symptoms represent a change from baseline from which the patient cannot be reasonably expected to improve with current level of care. The patient presents with risk requiring inpatient psychiatric hospitalization for safety and stabilization. At this time there are no available psychiatric jr-beds and patient will be admitted to medicine and followed by CL.  Recommend  continue constant observation and PATIENT CANNOT LEAVE AMA   continue Prozac 40 mg daily, Klonopin 0.5mg daily , and Trazodone 100mg hs   CL will follow patient on medicine.

## 2022-05-18 NOTE — ED PROVIDER NOTE - CONSTITUTIONAL, MLM
Well appearing, awake, alert, oriented to person, place, time/situation and in no apparent distress. Talking with eyes closed. normal...

## 2022-05-18 NOTE — ED ADULT NURSE NOTE - NSIMPLEMENTINTERV_GEN_ALL_ED
Implemented All Fall with Harm Risk Interventions:  [place] to call system. Call bell, personal items and telephone within reach. Instruct patient to call for assistance. Room bathroom lighting operational. Non-slip footwear when patient is off stretcher. Physically safe environment: no spills, clutter or unnecessary equipment. Stretcher in lowest position, wheels locked, appropriate side rails in place. Provide visual cue, wrist band, yellow gown, etc. Monitor gait and stability. Monitor for mental status changes and reorient to person, place, and time. Review medications for side effects contributing to fall risk. Reinforce activity limits and safety measures with patient and family. Provide visual clues: red socks.

## 2022-05-18 NOTE — ED PROVIDER NOTE - OBJECTIVE STATEMENT
71 y/o female with pmhx of depression, SI, hypothyroidism, DM, obesity, HTN, HLD, MS, COPD, UTI, DNR and DNI, presents from nursing home for depression and SI. Pt states she doesn't like the nursing home she lives at and doesn't like to be told what to do or what to eat. States if she didn't live there, and live outside normally she would use a gun to kill herself. Pt denies HI. Sent to ED for pysch evaluation. Pt eating and sleeping okay. No cp, sob, fevers, chills, abd pain, n/v, dysuria. 73 y/o female with pmhx of depression, SI, hypothyroidism, DM, obesity, HTN, HLD, MS, COPD, UTI, DNR and DNI, presents from nursing home for depression and SI. Pt states she doesn't like the nursing home she lives at and doesn't like to be told what to do or what to eat. States if she didn't live there, and live outside normally she would use a gun to kill herself. Pt denies HI. Sent to ED for pysch evaluation. Pt eating and sleeping okay. No cp, sob, fevers, chills, abd pain, n/v, dysuria.  Attending - Agree with above.  I evaluated patient myself. 73 y/o F with extensive PMH as noted, including depression.  Reviewed transfer papers from NH.  Patient s/p attempt at cutting wrist on 5/13, evaluated at OSH and returned to NH.  Now transferred to [facility] as patient with continued SI and concern for safety at NH.  MOLST with patient documenting DNR/DNI/DNH.  However, they documented contact with family and understanding of transfer to hospital.  Patient denies any pain or complaint.

## 2022-05-18 NOTE — ED BEHAVIORAL HEALTH ASSESSMENT NOTE - VIOLENCE PROTECTIVE FACTORS:
Residential stability/Relationship stability/Sobriety/Engagement in treatment Residential stability/Relationship stability/Sobriety

## 2022-05-18 NOTE — ED BEHAVIORAL HEALTH ASSESSMENT NOTE - CURRENT MEDICATION
see HPI Klonopin 0.5 Q daily (increased to twice daily 5/11), Ropinirole HCL 2 mg at night, Acetaminophen 650 Q 6 hours PRN for pain, Xyzal 5 mg daily,  Trazodone 100 mg Q HS, Fluoxetine 40 m g daily, Lisinopril 20 mg daily, Hydrochlorothiazide 25 mg daily, Methimazole 5 mg daily, Vitamin D 50,000 units once a week, Plegridy 125 mcg/0.5 ml IM One injection every 14 days Klonopin 0.5mg daily, Acetaminophen 650 Q 6 hours PRN for pain, Xyzal 5 mg daily, Trazodone 100 mg Q HS, Fluoxetine 40 m g daily, Lisinopril 20 mg daily, Hydrochlorothiazide 25 mg daily, Methimazole 5 mg daily, Vitamin D 50,000 units once a week, Plegridy 125 mcg/0.5 ml IM One injection every 14 days

## 2022-05-18 NOTE — ED ADULT TRIAGE NOTE - CHIEF COMPLAINT QUOTE
Pt VINNY from [facility], non-ambulatory; c/o suicidal ideation x 5 days, was d/c'd from [place] 5 days ago but when interviewed by Psych at [facility] today they felt she needs a further Psych eval and admission for SI.  Pt lethargic and answers with only grunts  Pt has DNR/DNI/ and DNH however [facility]'s policy is to send to hospital if SI Pt VINNY from [facility], non-ambulatory; c/o suicidal ideation x 5 days, was d/c'd from [place] 5 days ago but when interviewed by Psych at [facility] today they felt she needs a further Psych eval and admission for SI.  Pt lethargic and answers with only grunts; fs glu 96  Pt has DNR/DNI/ and DNH however [facility]'s policy is to send to hospital if SI

## 2022-05-18 NOTE — ED ADULT NURSE REASSESSMENT NOTE - NS ED NURSE REASSESS COMMENT FT1
Report received from OWEN Valdez. Pt is sleeping in stretcher, RR even and unlabored. PCA at bedside for 1:1 observation , safety maintained

## 2022-05-18 NOTE — ED PROVIDER NOTE - PHYSICAL EXAMINATION
ATTENDING PHYSICAL EXAM  GEN - NAD; answers questions with hesitation.  HEAD - NC/AT; EYES/NOSE - PERRL, EOMI, mucous membranes moist, no discharge; THROAT: Oral cavity and pharynx normal. No inflammation, swelling, exudate, or lesions  NECK: Neck supple, non-tender without lymphadenopathy, no masses, no JVD  PULMONARY - CTA b/l, symmetric breath sounds, no w/r/r  CARDIAC -s1s2, RRR, no M,R,G  ABDOMEN - +NABS, ND, NT, soft, no guarding, no rebound, no masses   BACK - no CVA tenderness, No vertebral or paravertebral tenderness  EXTREMITIES - symmetric pulses, 2+ dp, capillary refill < 2 seconds, no clubbing, no cyanosis, no edema  SKIN - no rash or bruising   NEUROLOGIC - alert, CN 2-12 intact, no focal deficits  PSYCH - SI, poor insight

## 2022-05-18 NOTE — ED ADULT NURSE REASSESSMENT NOTE - NS ED NURSE REASSESS COMMENT FT1
Break RN note- patient resting quietly in bed, breathing even and nonlabored. No acute distress. Patient calm. 1:1 at bedside. Safety maintained. Patient stable upon exiting the room.

## 2022-05-18 NOTE — ED PROVIDER NOTE - NS ED ATTENDING STATEMENT MOD
This was a shared visit with the SENAIT. I reviewed and verified the documentation and independently performed the documented:

## 2022-05-19 ENCOUNTER — TRANSCRIPTION ENCOUNTER (OUTPATIENT)
Age: 72
End: 2022-05-19

## 2022-05-19 ENCOUNTER — INPATIENT (INPATIENT)
Facility: HOSPITAL | Age: 72
LOS: 5 days | Discharge: ANOTHER HOSPITAL | End: 2022-05-25
Attending: PSYCHIATRY & NEUROLOGY | Admitting: PSYCHIATRY & NEUROLOGY
Payer: MEDICARE

## 2022-05-19 VITALS
DIASTOLIC BLOOD PRESSURE: 60 MMHG | SYSTOLIC BLOOD PRESSURE: 111 MMHG | TEMPERATURE: 98 F | HEART RATE: 61 BPM | RESPIRATION RATE: 16 BRPM | OXYGEN SATURATION: 100 %

## 2022-05-19 VITALS — TEMPERATURE: 98 F | OXYGEN SATURATION: 100 %

## 2022-05-19 DIAGNOSIS — N39.0 URINARY TRACT INFECTION, SITE NOT SPECIFIED: ICD-10-CM

## 2022-05-19 DIAGNOSIS — G35 MULTIPLE SCLEROSIS: ICD-10-CM

## 2022-05-19 DIAGNOSIS — E05.90 THYROTOXICOSIS, UNSPECIFIED WITHOUT THYROTOXIC CRISIS OR STORM: ICD-10-CM

## 2022-05-19 DIAGNOSIS — F33.9 MAJOR DEPRESSIVE DISORDER, RECURRENT, UNSPECIFIED: ICD-10-CM

## 2022-05-19 DIAGNOSIS — I10 ESSENTIAL (PRIMARY) HYPERTENSION: ICD-10-CM

## 2022-05-19 DIAGNOSIS — Z29.9 ENCOUNTER FOR PROPHYLACTIC MEASURES, UNSPECIFIED: ICD-10-CM

## 2022-05-19 LAB
ANION GAP SERPL CALC-SCNC: 10 MMOL/L — SIGNIFICANT CHANGE UP (ref 7–14)
BUN SERPL-MCNC: 18 MG/DL — SIGNIFICANT CHANGE UP (ref 7–23)
CALCIUM SERPL-MCNC: 9.4 MG/DL — SIGNIFICANT CHANGE UP (ref 8.4–10.5)
CHLORIDE SERPL-SCNC: 102 MMOL/L — SIGNIFICANT CHANGE UP (ref 98–107)
CO2 SERPL-SCNC: 25 MMOL/L — SIGNIFICANT CHANGE UP (ref 22–31)
CREAT SERPL-MCNC: 0.71 MG/DL — SIGNIFICANT CHANGE UP (ref 0.5–1.3)
EGFR: 90 ML/MIN/1.73M2 — SIGNIFICANT CHANGE UP
FLUAV AG NPH QL: SIGNIFICANT CHANGE UP
FLUBV AG NPH QL: SIGNIFICANT CHANGE UP
GLUCOSE SERPL-MCNC: 76 MG/DL — SIGNIFICANT CHANGE UP (ref 70–99)
HCT VFR BLD CALC: 38.5 % — SIGNIFICANT CHANGE UP (ref 34.5–45)
HGB BLD-MCNC: 12.1 G/DL — SIGNIFICANT CHANGE UP (ref 11.5–15.5)
MAGNESIUM SERPL-MCNC: 2.1 MG/DL — SIGNIFICANT CHANGE UP (ref 1.6–2.6)
MCHC RBC-ENTMCNC: 30.1 PG — SIGNIFICANT CHANGE UP (ref 27–34)
MCHC RBC-ENTMCNC: 31.4 GM/DL — LOW (ref 32–36)
MCV RBC AUTO: 95.8 FL — SIGNIFICANT CHANGE UP (ref 80–100)
NRBC # BLD: 0 /100 WBCS — SIGNIFICANT CHANGE UP
NRBC # FLD: 0 K/UL — SIGNIFICANT CHANGE UP
PHOSPHATE SERPL-MCNC: 3.9 MG/DL — SIGNIFICANT CHANGE UP (ref 2.5–4.5)
PLATELET # BLD AUTO: 209 K/UL — SIGNIFICANT CHANGE UP (ref 150–400)
POTASSIUM SERPL-MCNC: 4.5 MMOL/L — SIGNIFICANT CHANGE UP (ref 3.5–5.3)
POTASSIUM SERPL-SCNC: 4.5 MMOL/L — SIGNIFICANT CHANGE UP (ref 3.5–5.3)
RBC # BLD: 4.02 M/UL — SIGNIFICANT CHANGE UP (ref 3.8–5.2)
RBC # FLD: 14 % — SIGNIFICANT CHANGE UP (ref 10.3–14.5)
RSV RNA NPH QL NAA+NON-PROBE: SIGNIFICANT CHANGE UP
SARS-COV-2 RNA SPEC QL NAA+PROBE: SIGNIFICANT CHANGE UP
SODIUM SERPL-SCNC: 137 MMOL/L — SIGNIFICANT CHANGE UP (ref 135–145)
T3FREE SERPL-MCNC: 3.03 PG/ML — SIGNIFICANT CHANGE UP (ref 1.8–4.6)
WBC # BLD: 5.58 K/UL — SIGNIFICANT CHANGE UP (ref 3.8–10.5)
WBC # FLD AUTO: 5.58 K/UL — SIGNIFICANT CHANGE UP (ref 3.8–10.5)

## 2022-05-19 PROCEDURE — 99223 1ST HOSP IP/OBS HIGH 75: CPT

## 2022-05-19 PROCEDURE — 99233 SBSQ HOSP IP/OBS HIGH 50: CPT

## 2022-05-19 PROCEDURE — 12345: CPT | Mod: NC

## 2022-05-19 RX ORDER — LORATADINE 10 MG/1
1 TABLET ORAL
Qty: 0 | Refills: 0 | DISCHARGE
Start: 2022-05-19

## 2022-05-19 RX ORDER — LEVOCETIRIZINE DIHYDROCHLORIDE 0.5 MG/ML
1 SOLUTION ORAL
Qty: 0 | Refills: 0 | DISCHARGE

## 2022-05-19 RX ORDER — LISINOPRIL 2.5 MG/1
20 TABLET ORAL DAILY
Refills: 0 | Status: DISCONTINUED | OUTPATIENT
Start: 2022-05-19 | End: 2022-05-19

## 2022-05-19 RX ORDER — ROPINIROLE 8 MG/1
2 TABLET, FILM COATED, EXTENDED RELEASE ORAL AT BEDTIME
Refills: 0 | Status: DISCONTINUED | OUTPATIENT
Start: 2022-05-19 | End: 2022-05-25

## 2022-05-19 RX ORDER — METHIMAZOLE 10 MG/1
0 TABLET ORAL
Qty: 0 | Refills: 72 | DISCHARGE

## 2022-05-19 RX ORDER — CLONAZEPAM 1 MG
1 TABLET ORAL
Qty: 0 | Refills: 0 | DISCHARGE

## 2022-05-19 RX ORDER — HYDROCHLOROTHIAZIDE 25 MG
25 TABLET ORAL DAILY
Refills: 0 | Status: DISCONTINUED | OUTPATIENT
Start: 2022-05-19 | End: 2022-05-20

## 2022-05-19 RX ORDER — CLONAZEPAM 1 MG
1 TABLET ORAL
Qty: 0 | Refills: 0 | DISCHARGE
Start: 2022-05-19

## 2022-05-19 RX ORDER — HYDROCHLOROTHIAZIDE 25 MG
25 TABLET ORAL DAILY
Refills: 0 | Status: DISCONTINUED | OUTPATIENT
Start: 2022-05-19 | End: 2022-05-19

## 2022-05-19 RX ORDER — ENOXAPARIN SODIUM 100 MG/ML
40 INJECTION SUBCUTANEOUS EVERY 24 HOURS
Refills: 0 | Status: DISCONTINUED | OUTPATIENT
Start: 2022-05-20 | End: 2022-05-25

## 2022-05-19 RX ORDER — LORATADINE 10 MG/1
10 TABLET ORAL DAILY
Refills: 0 | Status: DISCONTINUED | OUTPATIENT
Start: 2022-05-19 | End: 2022-05-19

## 2022-05-19 RX ORDER — ACETAMINOPHEN 500 MG
650 TABLET ORAL EVERY 6 HOURS
Refills: 0 | Status: DISCONTINUED | OUTPATIENT
Start: 2022-05-19 | End: 2022-05-19

## 2022-05-19 RX ORDER — ENOXAPARIN SODIUM 100 MG/ML
40 INJECTION SUBCUTANEOUS EVERY 24 HOURS
Refills: 0 | Status: DISCONTINUED | OUTPATIENT
Start: 2022-05-19 | End: 2022-05-19

## 2022-05-19 RX ORDER — QUETIAPINE FUMARATE 200 MG/1
25 TABLET, FILM COATED ORAL AT BEDTIME
Refills: 0 | Status: DISCONTINUED | OUTPATIENT
Start: 2022-05-19 | End: 2022-05-20

## 2022-05-19 RX ORDER — LEVOCETIRIZINE DIHYDROCHLORIDE 0.5 MG/ML
0 SOLUTION ORAL
Qty: 0 | Refills: 0 | DISCHARGE

## 2022-05-19 RX ORDER — CHOLECALCIFEROL (VITAMIN D3) 125 MCG
2000 CAPSULE ORAL DAILY
Refills: 0 | Status: DISCONTINUED | OUTPATIENT
Start: 2022-05-19 | End: 2022-05-19

## 2022-05-19 RX ORDER — LISINOPRIL 2.5 MG/1
20 TABLET ORAL DAILY
Refills: 0 | Status: DISCONTINUED | OUTPATIENT
Start: 2022-05-19 | End: 2022-05-25

## 2022-05-19 RX ORDER — CHOLECALCIFEROL (VITAMIN D3) 125 MCG
0 CAPSULE ORAL
Qty: 0 | Refills: 98 | DISCHARGE

## 2022-05-19 RX ORDER — TRAZODONE HCL 50 MG
100 TABLET ORAL AT BEDTIME
Refills: 0 | Status: DISCONTINUED | OUTPATIENT
Start: 2022-05-19 | End: 2022-05-20

## 2022-05-19 RX ORDER — ENOXAPARIN SODIUM 100 MG/ML
40 INJECTION SUBCUTANEOUS
Qty: 0 | Refills: 0 | DISCHARGE
Start: 2022-05-19

## 2022-05-19 RX ORDER — MONTELUKAST 4 MG/1
1 TABLET, CHEWABLE ORAL
Qty: 0 | Refills: 73 | DISCHARGE

## 2022-05-19 RX ORDER — CHOLECALCIFEROL (VITAMIN D3) 125 MCG
2000 CAPSULE ORAL
Qty: 0 | Refills: 0 | DISCHARGE
Start: 2022-05-19

## 2022-05-19 RX ORDER — TRAZODONE HCL 50 MG
0 TABLET ORAL
Qty: 0 | Refills: 0 | DISCHARGE

## 2022-05-19 RX ORDER — FLUOXETINE HCL 10 MG
0 CAPSULE ORAL
Qty: 0 | Refills: 0 | DISCHARGE

## 2022-05-19 RX ORDER — ROPINIROLE 8 MG/1
0 TABLET, FILM COATED, EXTENDED RELEASE ORAL
Qty: 0 | Refills: 0 | DISCHARGE

## 2022-05-19 RX ORDER — TRAZODONE HCL 50 MG
100 TABLET ORAL AT BEDTIME
Refills: 0 | Status: DISCONTINUED | OUTPATIENT
Start: 2022-05-19 | End: 2022-05-19

## 2022-05-19 RX ORDER — FLUOXETINE HCL 10 MG
40 CAPSULE ORAL DAILY
Refills: 0 | Status: DISCONTINUED | OUTPATIENT
Start: 2022-05-19 | End: 2022-05-19

## 2022-05-19 RX ORDER — CHOLECALCIFEROL (VITAMIN D3) 125 MCG
1 CAPSULE ORAL
Qty: 0 | Refills: 98 | DISCHARGE

## 2022-05-19 RX ORDER — CHOLECALCIFEROL (VITAMIN D3) 125 MCG
2000 CAPSULE ORAL DAILY
Refills: 0 | Status: DISCONTINUED | OUTPATIENT
Start: 2022-05-19 | End: 2022-05-25

## 2022-05-19 RX ORDER — QUETIAPINE FUMARATE 200 MG/1
12.5 TABLET, FILM COATED ORAL EVERY 4 HOURS
Refills: 0 | Status: DISCONTINUED | OUTPATIENT
Start: 2022-05-19 | End: 2022-05-20

## 2022-05-19 RX ORDER — OLANZAPINE 15 MG/1
2.5 TABLET, FILM COATED ORAL ONCE
Refills: 0 | Status: DISCONTINUED | OUTPATIENT
Start: 2022-05-19 | End: 2022-05-20

## 2022-05-19 RX ORDER — MONTELUKAST 4 MG/1
0 TABLET, CHEWABLE ORAL
Qty: 0 | Refills: 73 | DISCHARGE

## 2022-05-19 RX ORDER — MONTELUKAST 4 MG/1
10 TABLET, CHEWABLE ORAL AT BEDTIME
Refills: 0 | Status: DISCONTINUED | OUTPATIENT
Start: 2022-05-19 | End: 2022-05-19

## 2022-05-19 RX ORDER — LISINOPRIL 2.5 MG/1
0 TABLET ORAL
Qty: 0 | Refills: 0 | DISCHARGE

## 2022-05-19 RX ORDER — ROPINIROLE 8 MG/1
2 TABLET, FILM COATED, EXTENDED RELEASE ORAL AT BEDTIME
Refills: 0 | Status: DISCONTINUED | OUTPATIENT
Start: 2022-05-19 | End: 2022-05-19

## 2022-05-19 RX ORDER — CLONAZEPAM 1 MG
0.5 TABLET ORAL DAILY
Refills: 0 | Status: DISCONTINUED | OUTPATIENT
Start: 2022-05-19 | End: 2022-05-19

## 2022-05-19 RX ADMIN — Medication 0.5 MILLIGRAM(S): at 11:12

## 2022-05-19 RX ADMIN — Medication 100 MILLIGRAM(S): at 03:17

## 2022-05-19 RX ADMIN — Medication 25 MILLIGRAM(S): at 05:46

## 2022-05-19 RX ADMIN — Medication 1 TABLET(S): at 03:18

## 2022-05-19 RX ADMIN — LORATADINE 10 MILLIGRAM(S): 10 TABLET ORAL at 11:11

## 2022-05-19 RX ADMIN — Medication 40 MILLIGRAM(S): at 11:12

## 2022-05-19 RX ADMIN — LISINOPRIL 20 MILLIGRAM(S): 2.5 TABLET ORAL at 05:46

## 2022-05-19 RX ADMIN — Medication 2000 UNIT(S): at 11:11

## 2022-05-19 RX ADMIN — ENOXAPARIN SODIUM 40 MILLIGRAM(S): 100 INJECTION SUBCUTANEOUS at 05:45

## 2022-05-19 NOTE — BH CONSULTATION LIAISON PROGRESS NOTE - NSBHFUPINTERVALHXFT_PSY_A_CORE
Pt seen and chart reviewed. VSS. Slept well o/n, currently in ED hallway. Labs wnl. Patient is A&O x 1-2 (knows she is in the hospital only from surroundings, does not know what town or state) and cooperative on interview. She notes chronic depression that has not improved over the years despite various medications and that she wants a "gun so I can shoot myself in the head". Repeatedly endorses SI but no plans and identifies her family to be supportive and recognizes that they will feel sad, but "they will get over it". Pt is unable to recall any triggering events and states that she has been having problems with her memory for some time. Pt is blunted but reactive and is willing to go to [facility] inpatient for further management. She describes that her NH is efficient but less than ideal and always has SI there, but is unable to find a way to do it.   Patient denies anxiety, AH/VH, HI.        Pt seen and chart reviewed. VSS. Slept well o/n, currently in ED. Patient is A&O x 1-2 (knows she is in the hospital only from surroundings, does not know what town or state) and cooperative on interview. She notes chronic depression that has not improved over the years despite various medications and that she wants a "gun so I can shoot myself in the head". Repeatedly endorses SI but no plans and identifies her family to be supportive and recognizes that they will feel sad, but "they will get over it". Pt is unable to recall any triggering events and states that she has been having problems with her memory for some time. Pt is constricted but reactive and is willing to go to [facility] inpatient for further management. She describes that her NH is efficient but less than ideal and she always has SI there, but is unable to find a way to do it.   Patient denies anxiety, AH/VH, HI.

## 2022-05-19 NOTE — BH CONSULTATION LIAISON PROGRESS NOTE - DETAILS
denies previous attempts although may be memory-related. Questionable attempt 5/12/22 with butter knife to wrist. States she has been trying to think of ways but states she does not have access to means of hurting herself denies previous attempts although may be ?memory-related. Questionable attempt 5/12/22 with butter knife to wrist. States she has been trying to think of ways but states she does not have access to means of hurting herself

## 2022-05-19 NOTE — PROGRESS NOTE ADULT - SUBJECTIVE AND OBJECTIVE BOX
Patient is a 72y old  Female who presents with a chief complaint of + SI + depression (19 May 2022 12:46)      INTERVAL HPI/OVERNIGHT EVENTS:  Seen by me this afternoon, not really interacting much during interview. Doesn't want to eat. Aide at bedside. No complaints. Just wants to be covered as she feels cold.    Review of Systems: 12 point review of systems otherwise negative    MEDICATIONS  (STANDING):  cholecalciferol 2000 Unit(s) Oral daily  clonazePAM  Tablet 0.5 milliGRAM(s) Oral daily  enoxaparin Injectable 40 milliGRAM(s) SubCutaneous every 24 hours  FLUoxetine 40 milliGRAM(s) Oral daily  hydrochlorothiazide 25 milliGRAM(s) Oral daily  lisinopril 20 milliGRAM(s) Oral daily  loratadine 10 milliGRAM(s) Oral daily  methimazole 5 milliGRAM(s) Oral daily  montelukast 10 milliGRAM(s) Oral at bedtime  rOPINIRole 2 milliGRAM(s) Oral at bedtime  traZODone 100 milliGRAM(s) Oral at bedtime  trimethoprim  160 mG/sulfamethoxazole 800 mG 1 Tablet(s) Oral every 12 hours    MEDICATIONS  (PRN):  acetaminophen     Tablet .. 650 milliGRAM(s) Oral every 6 hours PRN Temp greater or equal to 38C (100.4F), Mild Pain (1 - 3), Moderate Pain (4 - 6)      Allergies    No Known Allergies    Intolerances          Vital Signs Last 24 Hrs  T(C): 36.9 (19 May 2022 16:58), Max: 36.9 (19 May 2022 16:58)  T(F): 98.4 (19 May 2022 16:58), Max: 98.4 (19 May 2022 16:58)  HR: 61 (19 May 2022 09:14) (52 - 63)  BP: 111/60 (19 May 2022 09:14) (111/60 - 120/66)  BP(mean): --  RR: 16 (19 May 2022 09:14) (16 - 18)  SpO2: 100% (19 May 2022 16:58) (96% - 100%)  CAPILLARY BLOOD GLUCOSE            Physical Exam:    Daily     Daily   General:  Well appearing, NAD, Obese  HEENT:  Nonicteric, PERRLA  CV:  RRR, no murmur, no JVD  Lungs:  CTA B/L, no wheezes, rales, rhonchi  Abdomen:  Soft, non-tender, no distended, positive BS  Extremities:  2+ pulses, no c/c, no edema  Skin:  Warm and dry, no rashes  :  No echevarria  Neuro:  Alert, non-focal, CN II-XII grossly intact  No Restraints    LABS:                        12.1   5.58  )-----------( 209      ( 19 May 2022 05:45 )             38.5     05-19    137  |  102  |  18  ----------------------------<  76  4.5   |  25  |  0.71    Ca    9.4      19 May 2022 05:45  Phos  3.9     05-19  Mg     2.10     05-19    TPro  7.1  /  Alb  4.1  /  TBili  0.3  /  DBili  x   /  AST  23  /  ALT  19  /  AlkPhos  73  05-18            RADIOLOGY & ADDITIONAL TESTS:  Reviewed by me

## 2022-05-19 NOTE — PROGRESS NOTE ADULT - PROBLEM SELECTOR PLAN 7
DVT ppx: Lovenox SQ 40mg QD  GOC - DNR/DNI/DNH- MOLST in chart      Planned for transfer/discharge to [facility] today

## 2022-05-19 NOTE — DISCHARGE NOTE PROVIDER - NSDCMRMEDTOKEN_GEN_ALL_CORE_FT
FLUoxetine 40 mg oral capsule: 1 cap(s) orally once a day  hydroCHLOROthiazide 25 mg oral tablet: 1 tab(s) orally once a day  KlonoPIN 0.5 mg oral tablet: 1 tab(s) orally once a day  levocetirizine 5 mg oral tablet: 1 tab(s) orally once a day (in the evening)  lisinopril 20 mg oral tablet: 1 tab(s) orally once a day  methIMAzole 5 mg oral tablet: 1 tab(s) orally once a day  montelukast 10 mg oral tablet: 1 tab(s) orally once a day (at bedtime)  Plegridy Pen 125 mcg/0.5 mL subcutaneous solution: 1 dose(s) subcutaneous every 2 weeks - next dose 05/30  rOPINIRole 2 mg oral tablet: 1 tab(s) orally once a day (at bedtime)  traZODone 100 mg oral tablet: 1 tab(s) orally once a day (at bedtime)  Vitamin D3 1250 mcg (50,000 intl units) oral capsule: 1 cap(s) orally once a week on Wed   cholecalciferol oral tablet: 2000 unit(s) orally once a day  clonazePAM 0.5 mg oral tablet: 1 tab(s) orally once a day  enoxaparin: 40 milligram(s) subcutaneous once a day  FLUoxetine 40 mg oral capsule: 1 cap(s) orally once a day  hydroCHLOROthiazide 25 mg oral tablet: 1 tab(s) orally once a day  lisinopril 20 mg oral tablet: 1 tab(s) orally once a day  loratadine 10 mg oral tablet: 1 tab(s) orally once a day  methIMAzole 5 mg oral tablet: 1 tab(s) orally once a day  montelukast 10 mg oral tablet: 1 tab(s) orally once a day (at bedtime)  Plegridy Pen 125 mcg/0.5 mL subcutaneous solution: 1 dose(s) subcutaneous every 2 weeks - next dose 05/30  rOPINIRole 2 mg oral tablet: 1 tab(s) orally once a day (at bedtime)  sulfamethoxazole-trimethoprim 800 mg-160 mg oral tablet: 1 tab(s) orally every 12 hours last dose 5/24@1800  traZODone 100 mg oral tablet: 1 tab(s) orally once a day (at bedtime)

## 2022-05-19 NOTE — BH INPATIENT PSYCHIATRY ASSESSMENT NOTE - NSBHASSESSSUMMFT_PSY_ALL_CORE
72 yoa MWF with daughter, domiciled at [facility] and rehab facility (since November 15 2021-transferred several months ago to memory unit); with  history of MDD and anxiety and no known psychiatric  hospitalizations; questionable suicide attempt 5/12/22; no known history of violence or arrests; no active substance abuse or h/o complicated withdrawal, PMhx Multiple Sclerosis on plegridy q 2 weekly, h/o UTI at present on Bactrim (5/17/22), COPD, DM type II, obesity, primary HTN, DLD, vascular dementia, hyperthyroidism, adult FTT, sent to ER from NH for SI. Patient is A&Ox2 (knows she is in the hospital). She reports a long h/o depression with worsening symptoms the past several months in the context of loss of independence and move to a long term skilled nursing facility. Patient is not able to remember the name of the facility but stated, " I hate it there." Patient currently  endorses symptoms of depression including depressed mood every day throughout the day, poor appetite, low energy, poor concentration and memory, hypersomnia and suicidal ideation. Patient reports if she could find a way to kill herself she would but has not been able to find a way.  Patient does not like living in the nursing home and does not see herself getting any better. She reports she use to be able to ambulate with a walker and is now wheel chair bound. Patient states she would miss her family if she killed herself but states she has experienced extreme loss in the past and knows they will recover.    DD  MDD recurrent, severe r/o psychosis  r/o 2/2 medical condition (microvascular dementia vs FTT vs other)  Anxiety disorder NOS  r/o SUMIT    PLAN  Admit 9.27 2PC INVOL for safety  Q15 adequate, CO not needed but for 11 PM to 7 AM as hospital bed requested for better mgmt.  Hospital bed order  Psych meds:  Stopped Prozac 40 qd- day team can better re-evaluate a mood/anxiolytic regimen  stopped klonopin 0.5 mg qd- day team can better re-evaluate a mood/anxiolytic regimen  Consider mood stabilizer Seroquel vs abilify- can Seroquel 100-300 mg qhs possibly replace 2 BP meds, klonopin, Prozac for mood/anxiety and trazodone for sleep, avoiding polypharmacy risks, improving compliance; alternatively abilify, also replacing ropinirole?  Possible goal of CAAL if abilify adopted for safety and compliance  Continue ropinirole 2 qhs but consider stoppage (only for RLS?)  PRNs: Zyprexa 2.5 IM PRNs and Seroquel 12.5 q 4h PO PRN  NUT: vitamin D, NUT consult, consider ENSURE if any NUT deficiencies but BMI=obesity?  Bowel regimen: miralax BID, senna x2 qhs if needed  Comorbids, medical: UTI- continue bactrim 12 more doses then stop- see MED note; HTN meds continued, methimazole 5 qd continued, stop other nonessential meds; MS peginterferon/plegridy CAAL q 2 weekly- see MED note, due 5/30; bring from residence? Not available as NF med at [facility] due to cost as per pharmacy.  Labs: resent UA and UC in AM  Pain mgmt.:  no apparent issues, if so consider an SNRI  MED consult and f/u for above  ?PT consult- ambulatory status unclear; walker need TBD.  NEURO consult- does not appear needed at present, no acute issues- dementia hx  Family collateral  Provider collateral  Group and milieu therapy as tolerated  Supportive therapy as tolerated  Dispo- TBD- return to NH vs other

## 2022-05-19 NOTE — PROGRESS NOTE ADULT - ASSESSMENT
71 y/o  F PMHx Dementia (A+Ox1-self), MDD hx of SI, DM HTN, HLD, MS, COPD not on 02  presents from [facility] for depression and SI

## 2022-05-19 NOTE — BH PATIENT PROFILE - FALL HARM RISK - RISK INTERVENTIONS

## 2022-05-19 NOTE — ED ADULT NURSE REASSESSMENT NOTE - NS ED NURSE REASSESS COMMENT FT1
Pt sleeping in stretcher, report given to ESSU 2 RN, pt remains on 1:1. Pt calm and cooperative, safety maintained. Pt brought over to ESSU 2 safely

## 2022-05-19 NOTE — BH CONSULTATION LIAISON PROGRESS NOTE - NSBHASSESSMENTFT_PSY_ALL_CORE
Pt is 72y F  with daughter, domiciled at [facility] and rehab facility (living there since November 15 2021-transferred several months ago to memory unit); with h/o long-term MDD; no known psychiatric  hospitalizations; questionable suicide attempt 5/12/22; no known history of violence or arrests; no active substance abuse or h/o complicated withdrawal, PMHx MS, h/o UTI, COPD, DM type II, obesity, primary HTN, hyperlipidemia, vascular dementia, hypothyroidism, adult failure to thrive, sent to ER from NH for suicidal ideation.   She continues to express depressive symptoms including depressed mood, anhedonia, decreased appetite, hypersomnia, decreased energy and concentration. Also expresses SI. Her MSE is significant for decreased productivity of speech and blunted affect with memory impairments. The patient presents with risk requiring inpatient psychiatric hospitalization for safety and stabilization. She requires constant observation and PATIENT CANNOT LEAVE AMA, pending jr-bed at [facility].    Plan: INCOMPLETE UNTIL ATTENDING ATTESTATION    1. Transfer to inpatient unit - geriatric bed available for patient, she is good to go pending medical clearance   2. CO 1:1 until transfer, pt is high risk of harm to self  3. Prozac 40 mg daily, Klonopin 0.5mg daily and Trazodone 100mg hs      Pt is 72y F  with daughter, domiciled at [facility] and rehab facility (living there since November 15 2021-transferred several months ago to memory unit); with h/o long-term MDD; no known psychiatric  hospitalizations; questionable suicide attempt 5/12/22; no known history of violence or arrests; no active substance abuse or h/o complicated withdrawal, PMHx MS, h/o UTI, COPD, DM type II, obesity, primary HTN, hyperlipidemia, vascular dementia, hypothyroidism, adult failure to thrive, sent to ER from NH for suicidal ideation. Patient continues to express depressive symptoms including depressed mood, anhedonia, decreased appetite, hypersomnia, decreased energy and concentration. Also expresses SI. Her MSE is significant for decreased productivity of speech and blunted affect with memory impairments. The patient remains at risk for harming herself and requires inpatient psychiatric hospitalization for safety and stabilization. She requires constant observation and PATIENT CANNOT LEAVE AMA, pending jr-bed at [facility].    Plan:   1. Transfer to inpatient unit - pending medical clearance   2. CO 1:1, pt is high risk of harm to self  3. Prozac 40 mg daily, Klonopin 0.5mg daily and Trazodone 100mg hs   4-May use Ativan 0.5mg po q8h PRN for anxiety/agitation

## 2022-05-19 NOTE — H&P ADULT - PROBLEM SELECTOR PLAN 5
- hx of MS  - wheelchair bound, incontinent   - pt recieves Plegridy q 2 weeks - next dose 05/30   - f/u neuro outpatient

## 2022-05-19 NOTE — H&P ADULT - PROBLEM SELECTOR PLAN 2
- CT head Severe chronic microvascular ischemic changes.  - at bedside patient is A+Ox1 (self) - as per notes patient has difficulty remembering date   - check UA  - check vitamin b12, folate, rpr

## 2022-05-19 NOTE — DISCHARGE NOTE PROVIDER - HOSPITAL COURSE
72 year old female with past medical history of (A+Ox1-self), MDD hx of SI, DM HTN, HLD, MS, COPD not on 02  presents from [facility] for depression and SI. CT hear performed in the ED w/severe chronic microvascular ischemic changes.  Of note patient was diagnosed with a urinary tract infection as an outpatient on 5/17 and was prescribed oral bactrim, this was continued inpatient.  Per chart notes patient attempted to cut her wrists w/a butter knife on 5/12/2022, she was seen by psychiatry who recommended inpatient psychiatric admission when medically cleared.  Methimazole was continued for hyperthyroidism and home medication regimen was continued for hypertension. Of note patient received Plegridy every 2 weeks for Multiple Sclerosis-Next dose is due 5/30.    On 5/19/2022 patient medically cleared for discharge to [facility] for psychiatric care, patient will continue oral antibiotics to complete course.

## 2022-05-19 NOTE — PROGRESS NOTE ADULT - PROBLEM SELECTOR PLAN 3
+ UTI dx on 05/17  - pt was Rx PO Bactrim on 05/17   - pt received two doses of PO Bactrim  - continue PO abx for total of 5- 7 days

## 2022-05-19 NOTE — H&P ADULT - NS ATTEND AMEND GEN_ALL_CORE FT
Please follow up with your primary medical doctor in 1-2 days.  See referral attached for follow up with a Urologist. See referral attached.    Take Ibuprofen 600mg every 6 hours   and Flomax 1tab once daily.    If you have any new, worsened or concerning symptoms, please return to the emergency department immediately. Pt with dementia, depression, DM, HTN, COPD p/w SI.  Pt reports feeling continued depression.    On exam, Heart RRR, lungs clear, abd benign, psych: blunted affect   Labs reviwed   Will consult psychiatry in am  1:1 monitoring  continue course of bactrim started at NH

## 2022-05-19 NOTE — DISCHARGE NOTE NURSING/CASE MANAGEMENT/SOCIAL WORK - PATIENT PORTAL LINK FT
You can access the FollowMyHealth Patient Portal offered by [facility] by registering at the following website: [email or web address]. By joining [employer]’s FollowMyHealth portal, you will also be able to view your health information using other applications (apps) compatible with our system.

## 2022-05-19 NOTE — H&P ADULT - PROBLEM SELECTOR PLAN 3
+ UTI dx on 05/17  - pt was Rx PO Bactrim  - send UA, Urine cx  - continue abx + UTI dx on 05/17  - pt was Rx PO Bactrim on 05/17   - repeat UA and Urine cx ordered   - pt received two doses of PO Bactrim  - continue PO abx

## 2022-05-19 NOTE — H&P ADULT - ASSESSMENT
71 y/o Obese F PMHx Dementia (A+Ox1-self), MDD hx of SI, DM HTN, HLD, MS, COPD not on 02  presents from [facility] for depression and SI 73 y/o  F PMHx Dementia (A+Ox1-self), MDD hx of SI, DM HTN, HLD, MS, COPD not on 02  presents from [facility] for depression and SI

## 2022-05-19 NOTE — DISCHARGE NOTE PROVIDER - NSDCCPCAREPLAN_GEN_ALL_CORE_FT
PRINCIPAL DISCHARGE DIAGNOSIS  Diagnosis: Suicidal ideation  Assessment and Plan of Treatment: You were transferred to [facility] for continued psychiatric care as there was a concern that you would harm yourself      SECONDARY DISCHARGE DIAGNOSES  Diagnosis: Urinary tract infection  Assessment and Plan of Treatment: You were started on antibiotics for a urinary infection. To help prevent future infections maintain healthy hygiene and avoid taking long baths. Wipe from front to back and empty your bladder frequently by keeping yourself properly hydrated. Monitor for signs/symptoms of infection, such as, fever/chills, burning/pain with urination, urinary frequency/hesitancy, cloudy urine, or blood in urine and follow-up with your primary care provider as outpatient for further care.  Complete your entire course of oral antibiotics through May 24th.      Diagnosis: Hypertension  Assessment and Plan of Treatment: Continue current blood pressure medication regimen as directed. Monitor for any visual changes, headaches or dizziness.  Monitor blood pressure regularly.  Follow up with your PCP for further management for high blood pressure, please call to make appointment within 1 week of discharge    Diagnosis: Hyperthyroidism  Assessment and Plan of Treatment: Continue with your Methimazole    Diagnosis: Multiple sclerosis  Assessment and Plan of Treatment: Takes Plegridy every 2 weeks for MS, next dose is due 5/30.

## 2022-05-19 NOTE — BH INPATIENT PSYCHIATRY ASSESSMENT NOTE - HPI (INCLUDE ILLNESS QUALITY, SEVERITY, DURATION, TIMING, CONTEXT, MODIFYING FACTORS, ASSOCIATED SIGNS AND SYMPTOMS)
Patient is a 72 year old, , female;  with daughter, currently domiciled at [facility] rehab facility (living there since November 15 2021-transferred several months ago to memory unit)  ; with  history of major depressive disorder and anxiety, ; no known psychiatric  hospitalizations; questionable suicide attempt 5/12/22; no known history of violence or arrests; no active substance abuse or h/o complicated withdrawal, , PMhx Multiple Sclerosis, h/o UTI, COPD, DM type II, obesity, primary HTN, hyperlipidemia, vascular dementia, hypothyroidism, adult failure to thrive, sent to ER from NH for suicidal ideation.     Patient is A&O x 2 ( knows she is in the hospital). She reports a long h/o depression with worsening symptoms the past several months in the context of loss of independence and move to a long term skilled nursing facility. Patient is not able to remember the name of the facility but stated, " I hate it there." Patient currently  endorses symptoms of depression including depressed mood every day throughout the day, poor appetite, low energy, poor concentration and memory, hypersomnia and suicidal ideation. Patient reports if she could find a way to kill herself she would but has not been able to find a way.  Patient does not like living in the nursing home and does not see herself getting any better. She reports she use to be able to ambulate with a walker and is now wheel chair bound. Patient states she would miss her family if she killed herself but states she has experienced extreme loss in the past and knows they will recover.    As far as other psychiatric symptoms, patient denies anxiety, and denies any current or recent auditory/visual hallucinations, thoughts of paranoia or ideas of reference. She also denies symptoms of sameera.  Of note, patient was seen in the ED at [facility] 5/12/22 after attempting to cut wrist with a butter knife.  Patient was evaluated by psychiatry and discharged back to her residence. She was seen today at [facility] by her psychiatrist and reported suicidal ideation.     Received professional collateral from patient's psychiatrist Dr. Alexander [phone]. Patient was placed on constant observation s/p discharge from [facility] pending visit with Dr. Alexander. Today patient was seen by Dr. Alexander and verbalized active suicidal ideation. Patient's depressive symptoms have worsened in the past week. Previously patent would engage in activities and appeared to enjoy visits with her . Dr. Alexander suspects patient's family recently told her she is not returning home and this triggered her worsening symptoms. Patient is currently prescribed Prozac 40 mg daily, Klonopin0.5mg daily and Trazodone 100 mg hs. See  note for personal collateral.    On [facility] 2S unit, pt nonverbal, A&Ox3, sedated from ?IM given in ED. Resting comfortably, no acute distress or pain. ED note above duly noted and appreciated. Recent hx raises question about possible need for and benefits from a mood stabilizer rather than SSRI/prozac. Hx of depression and anxiety and desire by pt to find implements to harm self, and even attempts to do so with a butter knife. See PLAN section. Unclear hx re parkinsonian symptoms vs restless legs (as per ED note) and role/effectiveness of ropinirole. Apparently recent UTI but unable to ask pt if symptoms for determination of any follow up care. RN team requested MED bed, hence CO order needed from 11 PM to 7 AM. Pt apparently has DNR.

## 2022-05-19 NOTE — H&P ADULT - PROBLEM SELECTOR PLAN 1
- patient with hx of MDD and anxiety,    - as per notes patient tried cutting her wrists with butter knife on 5/12/22  - psych following   - continue constant observation  - Ativan 1 mg po/im prn agitation  - Continue  Prozac 40 mg daily, Klonopin 0.5mg, and Trazodone 100mg hs  - dispo-  psychiatric facility when jr-beds available

## 2022-05-19 NOTE — PROGRESS NOTE ADULT - PROBLEM SELECTOR PLAN 5
- hx of MS  - wheelchair bound, incontinent   - pt recieves Plegridy q 2 weeks - next dose 05/30   - f/u neuro as outpatient

## 2022-05-19 NOTE — BH INPATIENT PSYCHIATRY ASSESSMENT NOTE - NSBHCHARTREVIEWVS_PSY_A_CORE FT
Vital Signs Last 24 Hrs  T(C): 36.9 (05-19-22 @ 16:58), Max: 36.9 (05-19-22 @ 16:58)  T(F): 98.4 (05-19-22 @ 16:58), Max: 98.4 (05-19-22 @ 16:58)  HR: 61 (05-19-22 @ 09:14) (52 - 63)  BP: 111/60 (05-19-22 @ 09:14) (111/60 - 120/66)  BP(mean): --  RR: 16 (05-19-22 @ 09:14) (16 - 18)  SpO2: 100% (05-19-22 @ 16:58) (96% - 100%)    Orthostatic VS  05-19-22 @ 16:58  Lying BP: --/-- HR: --  Sitting BP: 113/50 HR: 56  Standing BP: --/-- HR: --  Site: --  Mode: --

## 2022-05-19 NOTE — H&P ADULT - NEGATIVE NEUROLOGICAL SYMPTOMS
no weakness/no paresthesias/no generalized seizures/no focal seizures/no syncope/no headache/no loss of consciousness

## 2022-05-19 NOTE — PROGRESS NOTE ADULT - PROBLEM SELECTOR PLAN 2
- CT head Severe chronic microvascular ischemic changes.  - at bedside patient is A+Ox1 (self) - as per notes patient has difficulty remembering date

## 2022-05-19 NOTE — PROGRESS NOTE ADULT - PROBLEM SELECTOR PLAN 6
- hx of HTN  - DASH diet   - continue hctz 25mg QD and lisinopril 20mg QD      # Check accurate height/weight as pt seems to have Obesity

## 2022-05-19 NOTE — BH INPATIENT PSYCHIATRY ASSESSMENT NOTE - MSE UNSTRUCTURED FT
Unable to evalutate at present d/t sedation before transfer by CL to . Resting comfortably. NAD. VSS.  No acute labs or medical issues. Endorsing SI and that she was looking for items to harm herself with prior to admit at NH.  Attempted to use butter knife to cut wrists.  Baseline status A&Ox2?  I&J: poor

## 2022-05-19 NOTE — BH CONSULTATION LIAISON PROGRESS NOTE - OTHER
appears normal but past SA blunted but reactive Uses wheelchair; has MS appears normal but h/o recent self injurious behavior goal directed/concrete

## 2022-05-19 NOTE — BH INPATIENT PSYCHIATRY ASSESSMENT NOTE - CURRENT MEDICATION
MEDICATIONS  (STANDING):  cholecalciferol 2000 Unit(s) Oral daily  hydrochlorothiazide 25 milliGRAM(s) Oral daily  lisinopril 20 milliGRAM(s) Oral daily  methimazole 5 milliGRAM(s) Oral daily  rOPINIRole 2 milliGRAM(s) Oral at bedtime  traZODone 100 milliGRAM(s) Oral at bedtime  trimethoprim  160 mG/sulfamethoxazole 800 mG 1 Tablet(s) Oral every 12 hours    MEDICATIONS  (PRN):  OLANZapine Injectable 2.5 milliGRAM(s) IntraMuscular once PRN severe agitation or aggression  QUEtiapine 25 milliGRAM(s) Oral at bedtime PRN mood stabilizer/insomnia/anxiety  QUEtiapine 12.5 milliGRAM(s) Oral every 4 hours PRN agitation

## 2022-05-19 NOTE — H&P ADULT - HISTORY OF PRESENT ILLNESS
71 y/o Obese F PMHx Dementia (A+Ox1-self), MDD hx of SI, DM HTN, HLD, MS, COPD not on 02  presents from [facility] for depression and SI. As per ED notes patient stated she doesn't like the nursing home she lives at and doesn't like to be told what to do or what to eat. States if she didn't live there, and live outside normally she would use a gun to kill herself. Pt denies HI. Sent to ED for pysch evaluation. As per facility notes on 5/12/22 patient was observed using a butter knife to cut her wrists. She was sent to [facility] ER and returned back to [facility] on 5/13/22. At bedside patient denies any plans to hurt herself at the time and stated she was frustrated. As per notes patient has repeatedly made suicidal statements, showing depressed mood, loss of interest/please, low self esteem. Patient was Rx PO Bactrim on 05/17 for UTI. Currently patient denies any SI,HI,AH,VH. Uses wheelchair, incontinent. Denies fever, chills, headache, chest pain, dyspnea, abdominal pain, N/V/D, melena, hematochezia, dysuria, urinary frequency, hematuria, weakness, paresthesias     In ED vitals: Temp 97.9F HR 66 /65 Sp02 100% RA. Labs wnl CT head negative    73 y/o Obese F PMHx Dementia (A+Ox1-self), MDD hx of SI, DM HTN, HLD, MS, COPD not on 02  presents from [facility] for depression and SI. As per ED notes patient stated she doesn't like the nursing home she lives at and doesn't like to be told what to do or what to eat. States if she didn't live there, and live outside normally she would use a gun to kill herself. Pt denies HI. Sent to ED for pysch evaluation. As per facility notes on 5/12/22 patient was observed using a butter knife to cut her wrists. She was sent to [facility] ER and returned back to [facility] on 5/13/22. At bedside patient denies any plans to hurt herself at the time and stated she was frustrated. Currently patient denies any SI,HI,AH,VHAs per notes patient has repeatedly made suicidal statements, showing depressed mood, loss of interest/please, low self esteem. Patient was Rx PO Bactrim on 05/17 for UTI - received two doses. Patient is incontinent, denies any urinary complaints.. Uses wheelchair, Denies fever, chills, headache, chest pain, dyspnea, abdominal pain, N/V/D, melena, hematochezia, dysuria, urinary frequency, hematuria, weakness, paresthesias     In ED vitals: Temp 97.9F HR 66 /65 Sp02 100% RA. Labs wnl CT head negative

## 2022-05-19 NOTE — H&P ADULT - NSHPPHYSICALEXAM_GEN_ALL_CORE
Vital Signs Last 24 Hrs  T(C): 36.7 (18 May 2022 22:52), Max: 36.7 (18 May 2022 22:52)  T(F): 98 (18 May 2022 22:52), Max: 98 (18 May 2022 22:52)  HR: 52 (18 May 2022 22:52) (52 - 66)  BP: 120/66 (18 May 2022 22:52) (120/66 - 132/71)  BP(mean): --  RR: 18 (18 May 2022 22:52) (18 - 18)  SpO2: 96% (18 May 2022 22:52) (96% - 100%)    PHYSICAL EXAM:  General: Obese female resting comfortable in no acute distress.  Head: Normocephalic, atraumatic.    Eyes: PERRL.  EOMI.  No scleral icterus.    Mouth: Moist MM.  No oropharyngeal exudates.    Neck: Supple.  Full range of motion.   Heart: RRR.  Normal S1 and S2.    Lungs: Nonlabored breathing.  Good inspiratory effort.  CTAB.   Abdomen: Soft nontender nondistended + BS   Skin: Warm and dry.  No rashes.  Extremities: No cyanosis.  2+ peripheral pulses b/l.  Musculoskeletal: No joint deformities.  No spinal or paraspinal tenderness.  Neuro: A&Ox3. No focal deficits Vital Signs Last 24 Hrs  T(C): 36.7 (18 May 2022 22:52), Max: 36.7 (18 May 2022 22:52)  T(F): 98 (18 May 2022 22:52), Max: 98 (18 May 2022 22:52)  HR: 52 (18 May 2022 22:52) (52 - 66)  BP: 120/66 (18 May 2022 22:52) (120/66 - 132/71)  BP(mean): --  RR: 18 (18 May 2022 22:52) (18 - 18)  SpO2: 96% (18 May 2022 22:52) (96% - 100%)    PHYSICAL EXAM:  General: Obese female resting comfortable in no acute distress.  Head: Normocephalic, atraumatic.    Eyes: PERRL.  EOMI.  No scleral icterus.    Mouth: Moist MM.  No oropharyngeal exudates.    Neck: Supple.  Full range of motion.   Heart: RRR.  Normal S1 and S2.    Lungs: Nonlabored breathing.  Good inspiratory effort.  CTAB.   Abdomen: Soft nontender nondistended + BS   Skin: Warm and dry.  No rashes.  Extremities: No cyanosis.  2+ peripheral pulses b/l.  Musculoskeletal: No joint deformities.  No spinal or paraspinal tenderness.  Neuro: A&Ox1 (self). No focal deficits

## 2022-05-19 NOTE — CHART NOTE - NSCHARTNOTEFT_GEN_A_CORE
Called patient's daughter - April ([phone]). Updated on patient transfer to [address] [place] [address] at [facility] inpatient and patient course and status here at [facility]. Answered all questions and concerns and provided information about [facility] inpatient stay. Called patient's daughter - April ([phone]) (Pt. gave consent). Updated on patient transfer to [address] [place] [address] at [facility] inpatient and patient course and status here at [facility]. Answered all questions and concerns and provided contact information about [facility] inpatient unit.     Attending addendum:  Above was discussed with trainee.

## 2022-05-19 NOTE — BH CONSULTATION LIAISON PROGRESS NOTE - CASE SUMMARY
Chart reviewed, case d/w Dr. Diaz. Patient seen/evaluated with trainee, I agree with above assessment/plan. Patient AAOx1-2, cooperative, presents as depressed/withdrawn, reports worsening depression with ongoing suicidal ideations in the context of medical problems and psychosocial stressors. As per chart review, patient tried cutting her wrists with butter knife on 5/12/22. Pt. denies AH and VH, denies HI. Patient remains at high risk for self harm and will need an inpatient psychiatric admission for safety/stability/medication management pending medical clearance. Plan as above, case d/w Dr. Craft. 2PC completed and placed in the chart. Will follow

## 2022-05-19 NOTE — BH INPATIENT PSYCHIATRY ASSESSMENT NOTE - RISK ASSESSMENT
high- current suicidal ideation, unable to engage in safety planning, questionable past suicide attempt, loss of independence, isolation   protective factors- supportive family, compliance with medications,

## 2022-05-19 NOTE — PROGRESS NOTE ADULT - PROBLEM SELECTOR PLAN 1
- patient with hx of MDD and anxiety,    - as per notes patient tried cutting her wrists with butter knife on 5/12/22  - Psych following, apprec recs and d/w them  - continue constant observation  - Ativan 0.5 mg po/im prn agitation  - Continue  Prozac 40 mg daily, Klonopin 0.5mg QD, and Trazodone 100mg hs  - dispo-psychiatric facility when jr-beds available-going to [facility] today-

## 2022-05-19 NOTE — BH CONSULTATION LIAISON PROGRESS NOTE - NSBHCHARTREVIEWVS_PSY_A_CORE FT
Vital Signs Last 24 Hrs  T(C): 36.8 (19 May 2022 09:14), Max: 36.8 (19 May 2022 09:14)  T(F): 98.2 (19 May 2022 09:14), Max: 98.2 (19 May 2022 09:14)  HR: 61 (19 May 2022 09:14) (52 - 66)  BP: 111/60 (19 May 2022 09:14) (111/60 - 132/71)  BP(mean): --  RR: 16 (19 May 2022 09:14) (16 - 18)  SpO2: 100% (19 May 2022 09:14) (96% - 100%)

## 2022-05-19 NOTE — BH INPATIENT PSYCHIATRY ASSESSMENT NOTE - DETAILS
became slightly paranoid on Wellbutrin. denies previous attempts. States he has been trying to think of ways but does not think she has access to anythingl

## 2022-05-19 NOTE — BH CONSULTATION LIAISON PROGRESS NOTE - NSSUICPROTFACT_PSY_ALL_CORE
Physical Therapy  Visit Type: re-evaluation  Precautions:  Medical precautions:  fall risk; standard precautions.  Sternal, NWB  R LE    CABG on 10/9 with subsequent R LE compartment syndrome with fasciotomy and possible ileus    I & D of R LE with fasciotomy closure on 10/19.  10/28 transfer to CCU with PHILLY and need for dialysis  Lines:     Basic: telemetry, O2, urinary catheter and wound vac (wound vac)      Lines in chart and on patient reviewed, cautions maintained throughout session.  Safety Measures: bed alarm and bed rails      SUBJECTIVE                                                                                                            Patient agreed to participate in therapy this date.  \"I am afraid to do this\"    Prior Living Situation  Information Provided By:: patient  Type of Home: Apartment  # Steps to Enter: 13  # Steps in the Home: 0  Number of Rails: 2  Lives With: Spouse  Transportation: Drives  Bathroom Shower/Tub: Tub/Shower unit  Bathroom Toilet: Standard  Bathroom Accessibility: Entry Level         Prior Function  Prior ADL: Independent  Bed Mobility: Independent  Transfers: Independent  Ambulation in the Home: Independent  Ambulation in the Community: Independent  Steps into the Home: Modified Independent  Steps within the Home: Modified Independent  Transfer Equipment: None  Locomotion Equipment: None  History of Falls in past year: No  Prior Homemaking/IADLs: Independent  Vocational: Retired  Leisure: Hobbies - Yes (comment)  Additional Comments: walk    Patient / Family Goal: return to previous functional status, maximize function and return home      OBJECTIVE                                                                                                                Oriented to person, place and time     Affect/Behavior: anxious  Range of Motion (measured in degrees unless otherwise noted, active unless indicated)  Comments / Details: Limited in all directions  Balance     Sitting: Static: total assist - dependent  Bed Mobility:      Supine to sit: total assist - non-dependent (max x2)    Sit to supine: total assist - non-dependent (max x2)        Interventions                                                                                                       Supine    Lower Extremity: Bilateral: heel slides, ankle pumps and hip abduction/adduction, AAROM, 10 reps, 1 sets        ASSESSMENT                                                                                                                Impairments: range of motion, strength, balance deficits, safety awareness and activity tolerance  Functional Limitations: all functional mobility  Pt transferred supine to/from sit with max x2. While sitting pt needed max assist for sitting balance. Pt was unable to assist with balance at this time. Pt returned to supine and was able to assist with LE exercises in supine.        Discharge Recommendations   Recommendation for Discharge: PT IL: Patient needs daily, skilled therapy for 1-3 hours a day by at least two disciplines          Skilled therapy is required to address these limitations in attempt to maximize the patient's independence.    End of Session:   Location: in bed  Safety measures: alarm system in place/re-engaged, bed rails x4, call light within reach, equipment intact and lines intact  Handoff to: nurse            PLAN                                                                                                                            Suggestions for next session as indicated: PT Frequency: 5 days/week  Frequency Comments: last 10/29, P2-2    Interventions: balance, bed mobility, endurance training, functional transfer training, safety education, strengthening and gait training  Agreement to plan and goals: patient agrees with goals and treatment plan        GOALS:  Review Date: 11/5/2020  Long Term Goals: (to be met by time of discharge from hospital)  Sit to  supine: Patient will complete sit to supine moderate assist.  Supine to sit: Patient will complete supine to sit moderate assist.  Sit (edge of bed): Patient will sit at edge of bed for 15 minutes, minimal assist.     Documented in the chart in the following areas: Pain. Assessment.         Supportive social network of family or friends

## 2022-05-19 NOTE — BH CONSULTATION LIAISON PROGRESS NOTE - NSICDXBHSECONDARYDX_PSY_ALL_CORE
Vascular dementia without behavioral disturbance   F01.50  Multiple sclerosis   G35  Urinary tract infection   N39.0  Hypertension   I10  Need for prophylactic measure   Z29.9  Hyperthyroidism   E05.90

## 2022-05-19 NOTE — BH PATIENT PROFILE - HOME MEDICATIONS
sulfamethoxazole-trimethoprim 800 mg-160 mg oral tablet , 1 tab(s) orally every 12 hours last dose 5/24@1800  cholecalciferol oral tablet , 2000 unit(s) orally once a day  clonazePAM 0.5 mg oral tablet , 1 tab(s) orally once a day  loratadine 10 mg oral tablet , 1 tab(s) orally once a day  enoxaparin , 40 milligram(s) subcutaneous once a day  traZODone 100 mg oral tablet , 1 tab(s) orally once a day (at bedtime)  rOPINIRole 2 mg oral tablet , 1 tab(s) orally once a day (at bedtime)  Plegridy Pen 125 mcg/0.5 mL subcutaneous solution , 1 dose(s) subcutaneous every 2 weeks - next dose 05/30  montelukast 10 mg oral tablet , 1 tab(s) orally once a day (at bedtime)  methIMAzole 5 mg oral tablet , 1 tab(s) orally once a day  lisinopril 20 mg oral tablet , 1 tab(s) orally once a day  hydroCHLOROthiazide 25 mg oral tablet , 1 tab(s) orally once a day  FLUoxetine 40 mg oral capsule , 1 cap(s) orally once a day

## 2022-05-19 NOTE — BH CONSULTATION LIAISON PROGRESS NOTE - CURRENT MEDICATION
MEDICATIONS  (STANDING):  cholecalciferol 2000 Unit(s) Oral daily  clonazePAM  Tablet 0.5 milliGRAM(s) Oral daily  enoxaparin Injectable 40 milliGRAM(s) SubCutaneous every 24 hours  FLUoxetine 40 milliGRAM(s) Oral daily  hydrochlorothiazide 25 milliGRAM(s) Oral daily  lisinopril 20 milliGRAM(s) Oral daily  loratadine 10 milliGRAM(s) Oral daily  methimazole 5 milliGRAM(s) Oral daily  montelukast 10 milliGRAM(s) Oral at bedtime  rOPINIRole 2 milliGRAM(s) Oral at bedtime  traZODone 100 milliGRAM(s) Oral at bedtime  trimethoprim  160 mG/sulfamethoxazole 800 mG 1 Tablet(s) Oral every 12 hours    MEDICATIONS  (PRN):  acetaminophen     Tablet .. 650 milliGRAM(s) Oral every 6 hours PRN Temp greater or equal to 38C (100.4F), Mild Pain (1 - 3), Moderate Pain (4 - 6)

## 2022-05-20 DIAGNOSIS — F01.50 VASCULAR DEMENTIA WITHOUT BEHAVIORAL DISTURBANCE: ICD-10-CM

## 2022-05-20 DIAGNOSIS — E03.9 HYPOTHYROIDISM, UNSPECIFIED: ICD-10-CM

## 2022-05-20 DIAGNOSIS — E11.9 TYPE 2 DIABETES MELLITUS WITHOUT COMPLICATIONS: ICD-10-CM

## 2022-05-20 DIAGNOSIS — E78.5 HYPERLIPIDEMIA, UNSPECIFIED: ICD-10-CM

## 2022-05-20 DIAGNOSIS — I10 ESSENTIAL (PRIMARY) HYPERTENSION: ICD-10-CM

## 2022-05-20 DIAGNOSIS — E66.9 OBESITY, UNSPECIFIED: ICD-10-CM

## 2022-05-20 DIAGNOSIS — J44.9 CHRONIC OBSTRUCTIVE PULMONARY DISEASE, UNSPECIFIED: ICD-10-CM

## 2022-05-20 DIAGNOSIS — G35 MULTIPLE SCLEROSIS: ICD-10-CM

## 2022-05-20 PROCEDURE — 99232 SBSQ HOSP IP/OBS MODERATE 35: CPT

## 2022-05-20 PROCEDURE — 99223 1ST HOSP IP/OBS HIGH 75: CPT

## 2022-05-20 RX ORDER — FLUOXETINE HCL 10 MG
40 CAPSULE ORAL DAILY
Refills: 0 | Status: DISCONTINUED | OUTPATIENT
Start: 2022-05-21 | End: 2022-05-25

## 2022-05-20 RX ORDER — ATORVASTATIN CALCIUM 80 MG/1
40 TABLET, FILM COATED ORAL AT BEDTIME
Refills: 0 | Status: DISCONTINUED | OUTPATIENT
Start: 2022-05-20 | End: 2022-05-25

## 2022-05-20 RX ORDER — QUETIAPINE FUMARATE 200 MG/1
12.5 TABLET, FILM COATED ORAL AT BEDTIME
Refills: 0 | Status: DISCONTINUED | OUTPATIENT
Start: 2022-05-20 | End: 2022-05-25

## 2022-05-20 RX ADMIN — Medication 2000 UNIT(S): at 10:06

## 2022-05-20 RX ADMIN — Medication 1 TABLET(S): at 21:30

## 2022-05-20 RX ADMIN — ATORVASTATIN CALCIUM 40 MILLIGRAM(S): 80 TABLET, FILM COATED ORAL at 21:30

## 2022-05-20 RX ADMIN — Medication 1 TABLET(S): at 10:05

## 2022-05-20 RX ADMIN — ROPINIROLE 2 MILLIGRAM(S): 8 TABLET, FILM COATED, EXTENDED RELEASE ORAL at 21:30

## 2022-05-20 RX ADMIN — LISINOPRIL 20 MILLIGRAM(S): 2.5 TABLET ORAL at 10:17

## 2022-05-20 RX ADMIN — QUETIAPINE FUMARATE 12.5 MILLIGRAM(S): 200 TABLET, FILM COATED ORAL at 21:31

## 2022-05-20 RX ADMIN — ENOXAPARIN SODIUM 40 MILLIGRAM(S): 100 INJECTION SUBCUTANEOUS at 10:05

## 2022-05-20 NOTE — PHYSICAL THERAPY INITIAL EVALUATION ADULT - IMPAIRMENTS FOUND, PT EVAL
aerobic capacity/endurance/ergonomics and body mechanics/joint integrity and mobility/muscle strength/posture

## 2022-05-20 NOTE — PHYSICAL THERAPY INITIAL EVALUATION ADULT - PERTINENT HX OF CURRENT PROBLEM, REHAB EVAL
Patient is a 72 year old, , female; with daughter, currently domiciled at [facility] re[facility]; with  history of major depressive disorder and anxiety; no known psychiatric  hospitalizations; PMhx Multiple Sclerosis, h/o UTI, COPD, DM type II, obesity, primary HTN, hyperlipidemia, vascular dementia, hypothyroidism, adult failure to thrive, sent to ER from NH for suicidal ideation. Patient referred to PT for evaluation.

## 2022-05-20 NOTE — BH INPATIENT PSYCHIATRY PROGRESS NOTE - CURRENT MEDICATION
MEDICATIONS  (STANDING):  cholecalciferol 2000 Unit(s) Oral daily  enoxaparin Injectable 40 milliGRAM(s) SubCutaneous every 24 hours  lisinopril 20 milliGRAM(s) Oral daily  methimazole 5 milliGRAM(s) Oral daily  rOPINIRole 2 milliGRAM(s) Oral at bedtime  traZODone 100 milliGRAM(s) Oral at bedtime  trimethoprim  160 mG/sulfamethoxazole 800 mG 1 Tablet(s) Oral every 12 hours    MEDICATIONS  (PRN):  OLANZapine Injectable 2.5 milliGRAM(s) IntraMuscular once PRN severe agitation or aggression  QUEtiapine 25 milliGRAM(s) Oral at bedtime PRN mood stabilizer/insomnia/anxiety  QUEtiapine 12.5 milliGRAM(s) Oral every 4 hours PRN agitation   MEDICATIONS  (STANDING):  atorvastatin 40 milliGRAM(s) Oral at bedtime  cholecalciferol 2000 Unit(s) Oral daily  enoxaparin Injectable 40 milliGRAM(s) SubCutaneous every 24 hours  lisinopril 20 milliGRAM(s) Oral daily  methimazole 5 milliGRAM(s) Oral daily  QUEtiapine 12.5 milliGRAM(s) Oral at bedtime  rOPINIRole 2 milliGRAM(s) Oral at bedtime  trimethoprim  160 mG/sulfamethoxazole 800 mG 1 Tablet(s) Oral every 12 hours    MEDICATIONS  (PRN):  LORazepam     Tablet 0.5 milliGRAM(s) Oral every 6 hours PRN agitation  LORazepam   Injectable 0.5 milliGRAM(s) IntraMuscular once PRN agitation

## 2022-05-20 NOTE — CONSULT NOTE ADULT - ASSESSMENT
71 y/o Obese F PMHx  vascular dementia (A+Ox1-self), MDD hx of SI, DM HTN, HLD, MS (wheelchair dependent, on Plegridy), hyperthyroidism (on methimazole), COPD not on 02 presented to [facility] from [facility], prior to this she came from [facility] for depression and SI.    #MDD w SI  -    71 y/o Obese F PMHx  vascular dementia (A+Ox1-self), MDD hx of SI, DM HTN, HLD, MS (wheelchair dependent, on Plegridy), hyperthyroidism (on methimazole), COPD not on 02 presented to [facility] from [facility], prior to this she came from [facility] for depression and SI.    #MDD w SI  # Vascular dementia  - On trazodone, seroqule, ropinirole  - As per psych    # HTN  BP well controlled, previously on HCTZ and lisinopril   - D/c HCTZ  - Cont lisinopril 20 mg daily    # MS  - Next dose of Plegridy reportedly due 5/30  - Contact family to bring this medications in. Attempt x2 to contact on 5/20.     # HLD  - statin     # Hyperthyroidism  CBC w diff reviewed from 5/18. TSH of 0.46, ft3 of 3.03 on May 18 2022  - Cont methimazole 5 mg daily    # UTI  Diagnosed prior to [facility] hospitalization, per prior records diagnosed on 5/17  - Can complete a 3 day course of bactrim, end date of 5/21 for PO bactrim BID    DVT ppx-- need final weight documented in chart.  - Cont lovenox 40 sq daily for now    PT/OT

## 2022-05-20 NOTE — BH INPATIENT PSYCHIATRY PROGRESS NOTE - NSICDXBHSECONDARYDX_PSY_ALL_CORE
Vascular dementia   F01.50   Vascular dementia   F01.50  COPD, mild   J44.9  DM (diabetes mellitus)   E11.9  HTN (hypertension)   I10  Multiple sclerosis   G35  Hyperlipidemia   E78.5  Hypothyroidism   E03.9  Obesity   E66.9

## 2022-05-20 NOTE — BH SOCIAL WORK INITIAL PSYCHOSOCIAL EVALUATION - NSHIGHRISKBEHFT_PSY_ALL_CORE
The pt reportedly has experienced SI for several years and questionable SA 5/12/22by cutting her wrists with a butter knife. The pt's daughter reports that the pt had expressed SI for several years and made a questionable SA 5/12/22by cutting her wrists with a butter knife.

## 2022-05-20 NOTE — BH INPATIENT PSYCHIATRY PROGRESS NOTE - NSBHASSESSSUMMFT_PSY_ALL_CORE
72 year old woman, with daughter, domiciled at [facility] and rehab facility (since November 15 2021-transferred several months ago to memory unit); with  history of MDD and anxiety and no known psychiatric  hospitalizations; questionable suicide attempt 5/12/22; no known history of violence or arrests; no active substance abuse or h/o complicated withdrawal, PMhx Multiple Sclerosis on plegridy q 2 weekly, h/o UTI at present on Bactrim (5/17/22), COPD, DM type II, obesity, primary HTN, DLD, vascular dementia, hyperthyroidism, adult FTT, sent to ER from NH for SI. Patient is A&Ox2 (knows she is in the hospital). She reports a long h/o depression with worsening symptoms the past several months in the context of loss of independence and move to a long term skilled nursing facility. Patient is not able to remember the name of the facility but stated, " I hate it there." Patient currently  endorses symptoms of depression including depressed mood every day throughout the day, poor appetite, low energy, poor concentration and memory, hypersomnia and suicidal ideation. Patient reports if she could find a way to kill herself she would but has not been able to find a way.  Patient does not like living in the nursing home and does not see herself getting any better. She reports she use to be able to ambulate with a walker and is now wheel chair bound. Patient states she would miss her family if she killed herself but states she has experienced extreme loss in the past and knows they will recover.     On assessment today, pt increasingly lethargic, depressed, paranoia, suicidal though no current active plan. Pt AAOx2.     DDx  MDD recurrent, severe with psychotic features   r/o 2/2 medical condition (microvascular dementia vs FTT vs other)  Anxiety disorder NOS  r/o SUMIT    Plan:  1)Legal- Admit 9.27  2) Safety- Pt requires hospital bed, will need CO from 11pm-7 am.   3)Psychiatric medications: Will continue home Prozac 40 mg daily for mood sx for now. Add Seroquel 12.5 mg qhs for adjunct therapy. Will continue holding klonopin 0.5 mg daily due to increased sedation. Discontinue trazodone due to her sedation. Consider ECT, discussed with pt's daughter (HCP) who will consider. PRNs: Ativan 0.5 mg PO/IM q6h for agitation. Continue ropinirole as ordered.   4) Medical: Pt with hx of MS, on pegintererfon/plegridy CAAL q2 weeks (daughter states she will bring in during visiting hours). UTI- continue bactrim. Continue lisinopril, hctz for HTN. Continue methimazole 5 mg for hypothyroidism. Will repeat BMP for 5/23. Consider neuro consult for next week due to hx of MS, potential worsening of sx if adjunct antipsychotics needed.  5) I/g/m therapy as appropriate. DIET- ordered for bite sized due to potential dysphagia, challenges eating. Will re-eval  6) dispo/collateral - pending improvement of sx. Collateral obtained from pt's daughter, April on 5/20. Will obtain collateral from outpatient psychiatrist, Dr. Alexander     72 year old woman, with daughter, domiciled at [facility] and rehab facility (since November 15 2021-transferred several months ago to memory unit); with  history of MDD and anxiety and no known psychiatric  hospitalizations; questionable suicide attempt 5/12/22; no known history of violence or arrests; no active substance abuse or h/o complicated withdrawal, PMhx Multiple Sclerosis on plegridy q 2 weekly, h/o UTI at present on Bactrim (5/17/22), COPD, DM type II, obesity, primary HTN, DLD, vascular dementia, hyperthyroidism, adult FTT, sent to ER from NH for SI. Patient is A&Ox2 (knows she is in the hospital). She reports a long h/o depression with worsening symptoms the past several months in the context of loss of independence and move to a long term skilled nursing facility. Patient is not able to remember the name of the facility but stated, " I hate it there." Patient currently  endorses symptoms of depression including depressed mood every day throughout the day, poor appetite, low energy, poor concentration and memory, hypersomnia and suicidal ideation. Patient reports if she could find a way to kill herself she would but has not been able to find a way.  Patient does not like living in the nursing home and does not see herself getting any better. She reports she use to be able to ambulate with a walker and is now wheel chair bound. Patient states she would miss her family if she killed herself but states she has experienced extreme loss in the past and knows they will recover.     On assessment today, pt increasingly lethargic, depressed, paranoia, suicidal though no current active plan. Pt AAOx2. Pt requires inpatient admission for further safety and stabilization.     DDx  MDD recurrent, severe with psychotic features   r/o 2/2 medical condition (microvascular dementia vs FTT vs other)  Anxiety disorder NOS  r/o SUMIT    Plan:  1)Legal- Admit 9.27  2) Safety- Pt requires hospital bed, will need CO from 11pm-7 am.   3)Psychiatric medications: Will continue home Prozac 40 mg daily for mood sx for now. Add Seroquel 12.5 mg qhs for adjunct therapy. Will continue holding klonopin 0.5 mg daily due to increased sedation. Discontinue trazodone due to her sedation. Consider ECT, discussed with pt's daughter (HCP) who will consider. PRNs: Ativan 0.5 mg PO/IM q6h for agitation. Continue ropinirole as ordered.   4) Medical: Pt with hx of MS, on pegintererfon/plegridy CAAL q2 weeks (daughter states she will bring in during visiting hours). UTI- continue bactrim. Continue lisinopril, hctz for HTN. Continue methimazole 5 mg for hypothyroidism. Will repeat BMP for 5/23. Consider neuro consult for next week due to hx of MS, potential worsening of sx if adjunct antipsychotics needed.  5) I/g/m therapy as appropriate. DIET- ordered for bite sized due to potential dysphagia, challenges eating. Will re-eval  6) dispo/collateral - pending improvement of sx. Collateral obtained from pt's daughter, April on 5/20. Will obtain collateral from outpatient psychiatrist, Dr. Alexander

## 2022-05-20 NOTE — PSYCHIATRIC REHAB INITIAL EVALUATION - NSBHPRRECOMMEND_PSY_ALL_CORE
Writer met with the patient to orient her to the psych rehab services and to establish therapeutic goals. However, patient was laying in a geriatric chair and was unresponsive to writer's questions. According to the chart patient was hospitalized after attempting to cut her wrist. According to the chart patient over the past week has become increasingly depressed, experiencing decreased concentration, insomnia, low energy and poor appetite. According to the chart patient has a long history of depression. According to the patient's chart patient was recently told by her family she would not be able to return to her home and must continue residing in a nursing home. According to the chart patient does not want to continue living in a nursing home. According to the chart patient does not ambulate independently and requires assistance with her ADL's.

## 2022-05-20 NOTE — BH INPATIENT PSYCHIATRY PROGRESS NOTE - PRN MEDS
MEDICATIONS  (PRN):  OLANZapine Injectable 2.5 milliGRAM(s) IntraMuscular once PRN severe agitation or aggression  QUEtiapine 25 milliGRAM(s) Oral at bedtime PRN mood stabilizer/insomnia/anxiety  QUEtiapine 12.5 milliGRAM(s) Oral every 4 hours PRN agitation   MEDICATIONS  (PRN):  LORazepam     Tablet 0.5 milliGRAM(s) Oral every 6 hours PRN agitation  LORazepam   Injectable 0.5 milliGRAM(s) IntraMuscular once PRN agitation

## 2022-05-20 NOTE — BH SOCIAL WORK INITIAL PSYCHOSOCIAL EVALUATION - NSBHSPIRITUAL_PSY_ALL_CORE
Unable to answer (specify)
Has The Patient Been Vaccinated For Covid-19?: No
Previous Infection Text: The patient has recovered from a previous COVID-19 infection.
Detail Level: Simple

## 2022-05-20 NOTE — BH INPATIENT PSYCHIATRY PROGRESS NOTE - NSBHFUPINTERVALHXFT_PSY_A_CORE
Pt was seen and evaluated for f/u depression, suicidality.  Pt was seen and evaluated for f/u depression, suicidality. She slept approximately 7 hours per sleep log. Did not require any PRNs and took standing medications. Case discussed with interdisciplinary team.    Pt was seen in the dayroom this am. Pt was initially lethargic, difficult to arouse even with sternal rub. Pt became alert when eyes manually opened by interview. AAOx2. Pt notes that she has been struggling with depression for her entire life and has been feeling suicidal. Pt reports that she has been living at a nursing facility which she does not like. She states that other individuals, but mainly staff have been treating her poorly, saying bad things about her. She states that she cannot think of a plan to end her life, though she wants to. States she has been with low energy, increasingly fatigued, hypersomnia. She reports poor appetite, anhedonia. Enjoys seeing her  who lives at home. She feels hopeless about her current situation and does not wish to be alive anymore.    Collateral obtained from pt's daughter, April- [phone]:  Per April, pt has been struggling with depression for as long as she has been alive. Pt states her psychiatrist has tried different cocktails of medications which have been ineffective. She has also had SI for several years, which has been pt's baseline for a long time. Per daughter, pt has been suffering from mild cognitive decline. Her neurologist (Dr. Keaton Wiley) started her on Nemenda and Aricept at one point. Pt has had chalenges walking for years due to her MS, and had previously ambulated with a walker. She has had multiple falls in the past attributable to this diagnosis. Of note, April recognized a change in her mental status approximately 6 months ago. In September, the pt had a hip replacement. After replaced, she required rehab. She has been typically using a wheelchair since then. Prior to 6 months ago, pt was tending to her own ADLs, functioning, more engaged. In October, April started noticing that the pt had been sundowning, getting more agitated, acting out of character. She was awake for a few days straight but then finally fell asleep for 24 hours.  was concerned and found her with a bottle of Ativan at that time. Uncertain if overdose or if she took pills in a suicide attempt (bottle was still almost full). At that time, she was taken to [place], transferred to [facility] for concerns of catatonia due to increased sedation, though pt's sx self-resolved, no confirmed diagnosis. Pt has been in a nursing facility since then. Pt with worsening helplessness, hopeless about her current state of health and lack of independent functioning. Pt did not recall details of potential suicide attempt using butter knife a few weeks ago. Daughter unsure if suicidal at the time or related to memory changes.

## 2022-05-20 NOTE — PHYSICAL THERAPY INITIAL EVALUATION ADULT - PLANNED THERAPY INTERVENTIONS, PT EVAL
balance training/bed mobility training/joint mobilization/postural re-education/strengthening/transfer training

## 2022-05-20 NOTE — BH INPATIENT PSYCHIATRY PROGRESS NOTE - MSE UNSTRUCTURED FT
Unable to evalutate at present d/t sedation before transfer by CL to . Resting comfortably. NAD. VSS.  No acute labs or medical issues. Endorsing SI and that she was looking for items to harm herself with prior to admit at NH.  Attempted to use butter knife to cut wrists.  Baseline status A&Ox2?  I&J: poor Pt appears stated age, poor hygiene and grooming, lethargic, obese. Pt able to open eyes and respond when eyelids manually opened by interview. Pt makes poor eye contact, cooperative, calm. Speech is slowed, mainly nonspontaneous, normal rhythm. Tremulous tongue movements back and forth. Mood is "depressed." Affect is mood congruent, constricted to negative emotion. TP is linear. TC with some paranoid delusions about nursing facility staff. +SI, denies HI, AVH. Insight and judgment are limited. AAOx2. Impulse control is fair.

## 2022-05-20 NOTE — CONSULT NOTE ADULT - SUBJECTIVE AND OBJECTIVE BOX
HPI:   71 y/o Obese F PMHx Dementia (A+Ox1-self), MDD hx of SI, DM HTN, HLD, MS, COPD not on 02  presents from [facility] for depression and SI.     PAST MEDICAL & SURGICAL HISTORY:  Major depression      Diabetes      Hypertension      Multiple sclerosis      Hypothyroidism      No significant past surgical history          Review of Systems:   CONSTITUTIONAL: No fever, weight loss, or fatigue  EYES: No eye pain, visual disturbances, or discharge  ENMT:  No difficulty hearing, tinnitus, vertigo; No sinus or throat pain  NECK: No pain or stiffness  RESPIRATORY: No cough, wheezing, chills or hemoptysis; No shortness of breath  CARDIOVASCULAR: No chest pain, palpitations, dizziness, or leg swelling  GASTROINTESTINAL: No abdominal or epigastric pain. No nausea, vomiting, or hematemesis; No diarrhea or constipation. No melena or hematochezia.  GENITOURINARY: No dysuria, frequency, hematuria, or incontinence  NEUROLOGICAL: No headaches, memory loss, loss of strength, numbness, or tremors  SKIN: No itching, burning, rashes, or lesions   LYMPH NODES: No enlarged glands  ENDOCRINE: No heat or cold intolerance; No hair loss  MUSCULOSKELETAL: No joint pain or swelling; No muscle, back, or extremity pain  HEME/LYMPH: No easy bruising, or bleeding gums  ALLERY AND IMMUNOLOGIC: No hives or eczema    Allergies    No Known Allergies    Intolerances        Social History:     FAMILY HISTORY:  No pertinent family history in first degree relatives        MEDICATIONS  (STANDING):  cholecalciferol 2000 Unit(s) Oral daily  enoxaparin Injectable 40 milliGRAM(s) SubCutaneous every 24 hours  lisinopril 20 milliGRAM(s) Oral daily  methimazole 5 milliGRAM(s) Oral daily  rOPINIRole 2 milliGRAM(s) Oral at bedtime  traZODone 100 milliGRAM(s) Oral at bedtime  trimethoprim  160 mG/sulfamethoxazole 800 mG 1 Tablet(s) Oral every 12 hours    MEDICATIONS  (PRN):  OLANZapine Injectable 2.5 milliGRAM(s) IntraMuscular once PRN severe agitation or aggression  QUEtiapine 25 milliGRAM(s) Oral at bedtime PRN mood stabilizer/insomnia/anxiety  QUEtiapine 12.5 milliGRAM(s) Oral every 4 hours PRN agitation      Vital Signs Last 24 Hrs  Temp 97.5F, / 71, HR 50  CAPILLARY BLOOD GLUCOSE            PHYSICAL EXAM:  GENERAL: NAD, well-developed  HEAD:  Atraumatic, Normocephalic  EYES: EOMI, conjunctiva and sclera clear  NECK: Supple, No JVD  CHEST/LUNG: Clear to auscultation bilaterally; No wheeze  HEART: Regular rate and rhythm; No murmurs, rubs, or gallops  ABDOMEN: Soft, Nontender, Nondistended; Bowel sounds present  EXTREMITIES:  2+ Peripheral Pulses, No clubbing, cyanosis, or edema  NEUROLOGY: non-focal  SKIN: No rashes or lesions    LABS:                        12.1   5.58  )-----------( 209      ( 19 May 2022 05:45 )             38.5     05-19    137  |  102  |  18  ----------------------------<  76  4.5   |  25  |  0.71    Ca    9.4      19 May 2022 05:45  Phos  3.9     05-19  Mg     2.10     05-19    TPro  7.1  /  Alb  4.1  /  TBili  0.3  /  DBili  x   /  AST  23  /  ALT  19  /  AlkPhos  73  05-18              EKG(personally reviewed):    RADIOLOGY & ADDITIONAL TESTS:    Imaging Personally Reviewed:    Consultant(s) Notes Reviewed:      Care Discussed with Consultants/Other Providers:   HPI:   73 y/o Obese F PMHx Dementia (A+Ox1-self), MDD hx of SI, DM HTN, HLD, MS (wheelchair dependent), hyperthyroidism (on methimazole), COPD not on 02 presented to [facility] from [facility], prior to this she came from [facility] for depression and SI. Pt unable to participate in exam/interview this am.     Attempted to reach patient's family for collateral (April [place])- [phone]- but unable to contact. Pt has been on    PAST MEDICAL & SURGICAL HISTORY:  Major depression      Diabetes      Hypertension      Multiple sclerosis      Hypothyroidism      No significant past surgical history          Review of Systems:   CONSTITUTIONAL: No fever, weight loss, or fatigue  EYES: No eye pain, visual disturbances, or discharge  ENMT:  No difficulty hearing, tinnitus, vertigo; No sinus or throat pain  NECK: No pain or stiffness  RESPIRATORY: No cough, wheezing, chills or hemoptysis; No shortness of breath  CARDIOVASCULAR: No chest pain, palpitations, dizziness, or leg swelling  GASTROINTESTINAL: No abdominal or epigastric pain. No nausea, vomiting, or hematemesis; No diarrhea or constipation. No melena or hematochezia.  GENITOURINARY: No dysuria, frequency, hematuria, or incontinence  NEUROLOGICAL: No headaches, memory loss, loss of strength, numbness, or tremors  SKIN: No itching, burning, rashes, or lesions   LYMPH NODES: No enlarged glands  ENDOCRINE: No heat or cold intolerance; No hair loss  MUSCULOSKELETAL: No joint pain or swelling; No muscle, back, or extremity pain  HEME/LYMPH: No easy bruising, or bleeding gums  ALLERY AND IMMUNOLOGIC: No hives or eczema    Allergies    No Known Allergies    Intolerances        Social History:     FAMILY HISTORY:  No pertinent family history in first degree relatives        MEDICATIONS  (STANDING):  cholecalciferol 2000 Unit(s) Oral daily  enoxaparin Injectable 40 milliGRAM(s) SubCutaneous every 24 hours  lisinopril 20 milliGRAM(s) Oral daily  methimazole 5 milliGRAM(s) Oral daily  rOPINIRole 2 milliGRAM(s) Oral at bedtime  traZODone 100 milliGRAM(s) Oral at bedtime  trimethoprim  160 mG/sulfamethoxazole 800 mG 1 Tablet(s) Oral every 12 hours    MEDICATIONS  (PRN):  OLANZapine Injectable 2.5 milliGRAM(s) IntraMuscular once PRN severe agitation or aggression  QUEtiapine 25 milliGRAM(s) Oral at bedtime PRN mood stabilizer/insomnia/anxiety  QUEtiapine 12.5 milliGRAM(s) Oral every 4 hours PRN agitation      Vital Signs Last 24 Hrs  Temp 97.5F, / 71, HR 50  CAPILLARY BLOOD GLUCOSE            PHYSICAL EXAM:  GENERAL: NAD, well-developed  HEAD:  Atraumatic, Normocephalic  EYES: EOMI, conjunctiva and sclera clear  NECK: Supple, No JVD  CHEST/LUNG: Clear to auscultation bilaterally; No wheeze  HEART: Regular rate and rhythm; No murmurs, rubs, or gallops  ABDOMEN: Soft, Nontender, Nondistended; Bowel sounds present  EXTREMITIES:  2+ Peripheral Pulses, No clubbing, cyanosis, or edema  NEUROLOGY: non-focal  SKIN: No rashes or lesions    LABS:                        12.1   5.58  )-----------( 209      ( 19 May 2022 05:45 )             38.5     05-19    137  |  102  |  18  ----------------------------<  76  4.5   |  25  |  0.71    Ca    9.4      19 May 2022 05:45  Phos  3.9     05-19  Mg     2.10     05-19    TPro  7.1  /  Alb  4.1  /  TBili  0.3  /  DBili  x   /  AST  23  /  ALT  19  /  AlkPhos  73  05-18              EKG(personally reviewed):    RADIOLOGY & ADDITIONAL TESTS:    Imaging Personally Reviewed:    Consultant(s) Notes Reviewed:      Care Discussed with Consultants/Other Providers:   HPI:   73 y/o Obese F PMHx Dementia (A+Ox1-self), MDD hx of SI, DM HTN, HLD, MS (wheelchair dependent, on Plegridy), hyperthyroidism (on methimazole), COPD not on 02 presented to [facility] from [facility], prior to this she came from [facility] for depression and SI. Pt unable to participate in exam/interview this am.     Attempted to reach patient's family for collateral (April [place])- [phone]- but unable to contact. Pt has been on a medication for her MS that is not on formulary at our pharmacy.     PAST MEDICAL & SURGICAL HISTORY:  Major depression      Diabetes      Hypertension      Multiple sclerosis      Hypothyroidism      No significant past surgical history          Review of Systems:   Unable to obtain.    Allergies    No Known Allergies    Intolerances        Social History:     FAMILY HISTORY:  No pertinent family history in first degree relatives        MEDICATIONS  (STANDING):  cholecalciferol 2000 Unit(s) Oral daily  enoxaparin Injectable 40 milliGRAM(s) SubCutaneous every 24 hours  lisinopril 20 milliGRAM(s) Oral daily  methimazole 5 milliGRAM(s) Oral daily  rOPINIRole 2 milliGRAM(s) Oral at bedtime  traZODone 100 milliGRAM(s) Oral at bedtime  trimethoprim  160 mG/sulfamethoxazole 800 mG 1 Tablet(s) Oral every 12 hours    MEDICATIONS  (PRN):  OLANZapine Injectable 2.5 milliGRAM(s) IntraMuscular once PRN severe agitation or aggression  QUEtiapine 25 milliGRAM(s) Oral at bedtime PRN mood stabilizer/insomnia/anxiety  QUEtiapine 12.5 milliGRAM(s) Oral every 4 hours PRN agitation      Vital Signs Last 24 Hrs  Temp 97.5F, / 71, HR 50  CAPILLARY BLOOD GLUCOSE          PHYSICAL EXAM:  GENERAL: NAD, lethargic, reclined in chair  HEAD:  Atraumatic, Normocephalic  EYES: EOMI, conjunctiva and sclera clear  NECK: Supple  CHEST/LUNG: Clear to auscultation bilaterally; No wheeze anteriorly  HEART: Regular rate and rhythm; Loud holosystolic murmur at LSB, no rubs, or gallops  ABDOMEN: Soft, Nontender, Nondistended; Bowel sounds present  EXTREMITIES:  2+ Peripheral Pulses, No clubbing, cyanosis; trace edema  NEUROLOGY: non-focal, unable to fully assess given lethargy  SKIN: No rashes or lesions    LABS:                        12.1   5.58  )-----------( 209      ( 19 May 2022 05:45 )             38.5     05-19    137  |  102  |  18  ----------------------------<  76  4.5   |  25  |  0.71    Ca    9.4      19 May 2022 05:45  Phos  3.9     05-19  Mg     2.10     05-19    TPro  7.1  /  Alb  4.1  /  TBili  0.3  /  DBili  x   /  AST  23  /  ALT  19  /  AlkPhos  73  05-18              EKG(personally reviewed):      Ventricular Rate 61 BPM    Atrial Rate 61 BPM    P-R Interval 188 ms    QRS Duration 94 ms    Q-T Interval 450 ms    QTC Calculation(Bazett) 453 ms    P Axis 62 degrees    R Axis 22 degrees    T Axis 58 degrees    Diagnosis Line Normal sinus rhythm  Normal ECG          RADIOLOGY & ADDITIONAL TESTS:    Imaging Personally Reviewed:    Consultant(s) Notes Reviewed:      Care Discussed with Consultants/Other Providers: Psych

## 2022-05-20 NOTE — BH SOCIAL WORK INITIAL PSYCHOSOCIAL EVALUATION - NSBHFINANCESOCIAL_PSY_ALL_CORE
The pt previously had community Medicaid and received home care services through [facility] contact Jessica, [phone], ext [phone]. The pt's daughter reports the pt's Medicaid is in process of being converted to LTC and [facility] will cover until 10/31/22.

## 2022-05-20 NOTE — BH SOCIAL WORK INITIAL PSYCHOSOCIAL EVALUATION - DETAILS
The pt's nephew committed suicide 30 years ago, jumping off a roof. The pt's daughter reports having depression.

## 2022-05-21 PROCEDURE — 99231 SBSQ HOSP IP/OBS SF/LOW 25: CPT

## 2022-05-21 RX ADMIN — ENOXAPARIN SODIUM 40 MILLIGRAM(S): 100 INJECTION SUBCUTANEOUS at 09:23

## 2022-05-21 RX ADMIN — LISINOPRIL 20 MILLIGRAM(S): 2.5 TABLET ORAL at 09:23

## 2022-05-21 RX ADMIN — Medication 40 MILLIGRAM(S): at 09:23

## 2022-05-21 RX ADMIN — ATORVASTATIN CALCIUM 40 MILLIGRAM(S): 80 TABLET, FILM COATED ORAL at 21:28

## 2022-05-21 RX ADMIN — ROPINIROLE 2 MILLIGRAM(S): 8 TABLET, FILM COATED, EXTENDED RELEASE ORAL at 21:28

## 2022-05-21 RX ADMIN — Medication 2000 UNIT(S): at 09:23

## 2022-05-21 RX ADMIN — QUETIAPINE FUMARATE 12.5 MILLIGRAM(S): 200 TABLET, FILM COATED ORAL at 21:28

## 2022-05-21 RX ADMIN — Medication 1 TABLET(S): at 09:22

## 2022-05-21 RX ADMIN — Medication 1 TABLET(S): at 21:28

## 2022-05-21 NOTE — BH INPATIENT PSYCHIATRY PROGRESS NOTE - MSE UNSTRUCTURED FT
Pt appears stated age, poor hygiene and grooming, lethargic, obese. Pt able to open eyes and respond when eyelids manually opened by interview. Pt makes poor eye contact, cooperative, calm. Speech is slowed, mainly nonspontaneous, normal rhythm. Tremulous tongue movements back and forth. Mood is "depressed." Affect is mood congruent, constricted to negative emotion. TP is linear. TC with some paranoid delusions about nursing facility staff. +SI, denies HI, AVH. Insight and judgment are limited. AAOx2. Impulse control is fair.

## 2022-05-21 NOTE — BH INPATIENT PSYCHIATRY PROGRESS NOTE - CURRENT MEDICATION
MEDICATIONS  (STANDING):  atorvastatin 40 milliGRAM(s) Oral at bedtime  cholecalciferol 2000 Unit(s) Oral daily  enoxaparin Injectable 40 milliGRAM(s) SubCutaneous every 24 hours  FLUoxetine 40 milliGRAM(s) Oral daily  lisinopril 20 milliGRAM(s) Oral daily  methimazole 5 milliGRAM(s) Oral daily  QUEtiapine 12.5 milliGRAM(s) Oral at bedtime  rOPINIRole 2 milliGRAM(s) Oral at bedtime  trimethoprim  160 mG/sulfamethoxazole 800 mG 1 Tablet(s) Oral every 12 hours    MEDICATIONS  (PRN):  LORazepam     Tablet 0.5 milliGRAM(s) Oral every 6 hours PRN agitation  LORazepam   Injectable 0.5 milliGRAM(s) IntraMuscular once PRN agitation

## 2022-05-21 NOTE — BH INPATIENT PSYCHIATRY PROGRESS NOTE - PRN MEDS
MEDICATIONS  (PRN):  LORazepam     Tablet 0.5 milliGRAM(s) Oral every 6 hours PRN agitation  LORazepam   Injectable 0.5 milliGRAM(s) IntraMuscular once PRN agitation

## 2022-05-22 PROCEDURE — 99231 SBSQ HOSP IP/OBS SF/LOW 25: CPT

## 2022-05-22 RX ADMIN — Medication 2000 UNIT(S): at 08:40

## 2022-05-22 RX ADMIN — Medication 1 TABLET(S): at 08:40

## 2022-05-22 RX ADMIN — Medication 40 MILLIGRAM(S): at 08:41

## 2022-05-22 RX ADMIN — QUETIAPINE FUMARATE 12.5 MILLIGRAM(S): 200 TABLET, FILM COATED ORAL at 21:40

## 2022-05-22 RX ADMIN — ATORVASTATIN CALCIUM 40 MILLIGRAM(S): 80 TABLET, FILM COATED ORAL at 21:39

## 2022-05-22 RX ADMIN — ROPINIROLE 2 MILLIGRAM(S): 8 TABLET, FILM COATED, EXTENDED RELEASE ORAL at 21:39

## 2022-05-22 RX ADMIN — ENOXAPARIN SODIUM 40 MILLIGRAM(S): 100 INJECTION SUBCUTANEOUS at 08:40

## 2022-05-22 RX ADMIN — Medication 1 TABLET(S): at 21:39

## 2022-05-22 NOTE — BH INPATIENT PSYCHIATRY PROGRESS NOTE - NSCGIIMPROVESX_PSY_ALL_CORE
3 = Minimally improved - slightly better with little or no clinically meaningful reduction of symptoms.  Represents very little change in basic clinical status, level of care, or functional capacity.
3 = Minimally improved - slightly better with little or no clinically meaningful reduction of symptoms.  Represents very little change in basic clinical status, level of care, or functional capacity.
4 = No change - symptoms remain essentially unchanged

## 2022-05-22 NOTE — BH INPATIENT PSYCHIATRY PROGRESS NOTE - NSCGISEVERILLNESS_PSY_ALL_CORE
5 = Markedly ill - intrusive symptoms that distinctly impair social/occupational function or cause intrusive levels of distress
5 = Markedly ill - intrusive symptoms that distinctly impair social/occupational function or cause intrusive levels of distress
6 = Severely ill - disruptive pathology, behavior and function are frequently influenced by symptoms, may require assistance from others

## 2022-05-23 LAB
ANION GAP SERPL CALC-SCNC: 8 MMOL/L — SIGNIFICANT CHANGE UP (ref 7–14)
BUN SERPL-MCNC: 17 MG/DL — SIGNIFICANT CHANGE UP (ref 7–23)
CALCIUM SERPL-MCNC: 9.3 MG/DL — SIGNIFICANT CHANGE UP (ref 8.4–10.5)
CHLORIDE SERPL-SCNC: 100 MMOL/L — SIGNIFICANT CHANGE UP (ref 98–107)
CO2 SERPL-SCNC: 25 MMOL/L — SIGNIFICANT CHANGE UP (ref 22–31)
CREAT SERPL-MCNC: 0.77 MG/DL — SIGNIFICANT CHANGE UP (ref 0.5–1.3)
EGFR: 81 ML/MIN/1.73M2 — SIGNIFICANT CHANGE UP
GLUCOSE SERPL-MCNC: 131 MG/DL — HIGH (ref 70–99)
MAGNESIUM SERPL-MCNC: 1.9 MG/DL — SIGNIFICANT CHANGE UP (ref 1.6–2.6)
PHOSPHATE SERPL-MCNC: 3.1 MG/DL — SIGNIFICANT CHANGE UP (ref 2.5–4.5)
POTASSIUM SERPL-MCNC: 4.4 MMOL/L — SIGNIFICANT CHANGE UP (ref 3.5–5.3)
POTASSIUM SERPL-SCNC: 4.4 MMOL/L — SIGNIFICANT CHANGE UP (ref 3.5–5.3)
SODIUM SERPL-SCNC: 133 MMOL/L — LOW (ref 135–145)

## 2022-05-23 PROCEDURE — 99232 SBSQ HOSP IP/OBS MODERATE 35: CPT

## 2022-05-23 RX ADMIN — LISINOPRIL 20 MILLIGRAM(S): 2.5 TABLET ORAL at 09:31

## 2022-05-23 RX ADMIN — ROPINIROLE 2 MILLIGRAM(S): 8 TABLET, FILM COATED, EXTENDED RELEASE ORAL at 21:42

## 2022-05-23 RX ADMIN — Medication 1 TABLET(S): at 21:42

## 2022-05-23 RX ADMIN — ENOXAPARIN SODIUM 40 MILLIGRAM(S): 100 INJECTION SUBCUTANEOUS at 09:32

## 2022-05-23 RX ADMIN — ATORVASTATIN CALCIUM 40 MILLIGRAM(S): 80 TABLET, FILM COATED ORAL at 21:42

## 2022-05-23 RX ADMIN — Medication 1 TABLET(S): at 09:31

## 2022-05-23 RX ADMIN — QUETIAPINE FUMARATE 12.5 MILLIGRAM(S): 200 TABLET, FILM COATED ORAL at 21:42

## 2022-05-23 RX ADMIN — Medication 2000 UNIT(S): at 09:31

## 2022-05-23 RX ADMIN — Medication 40 MILLIGRAM(S): at 09:31

## 2022-05-23 NOTE — CHART NOTE - NSCHARTNOTEFT_GEN_A_CORE
Contacted daughter April re Plegridy that patient is due for on 5/30 April said patient's  will bring in medication on 5/30 as it needs to stay refrigerated until administration. Family aware next dose due on 5/30.

## 2022-05-23 NOTE — BH INPATIENT PSYCHIATRY PROGRESS NOTE - NSICDXBHSECONDARYDX_PSY_ALL_CORE
Vascular dementia   F01.50  COPD, mild   J44.9  DM (diabetes mellitus)   E11.9  HTN (hypertension)   I10  Multiple sclerosis   G35  Hyperlipidemia   E78.5  Hypothyroidism   E03.9  Obesity   E66.9

## 2022-05-23 NOTE — DIETITIAN INITIAL EVALUATION ADULT - PERTINENT MEDS FT
MEDICATIONS  (STANDING):  atorvastatin 40 milliGRAM(s) Oral at bedtime  cholecalciferol 2000 Unit(s) Oral daily  enoxaparin Injectable 40 milliGRAM(s) SubCutaneous every 24 hours  FLUoxetine 40 milliGRAM(s) Oral daily  lisinopril 20 milliGRAM(s) Oral daily  methimazole 5 milliGRAM(s) Oral daily  QUEtiapine 12.5 milliGRAM(s) Oral at bedtime  rOPINIRole 2 milliGRAM(s) Oral at bedtime  trimethoprim  160 mG/sulfamethoxazole 800 mG 1 Tablet(s) Oral every 12 hours

## 2022-05-23 NOTE — BH INPATIENT PSYCHIATRY PROGRESS NOTE - MSE UNSTRUCTURED FT
Pt appears stated age, poor hygiene and grooming, lethargic, obese. Pt unwilling to open eyes for interview, though more engaged. Pt makes poor eye contact, cooperative, calm. Speech is slowed, mainly nonspontaneous, normal rhythm. Tremulous tongue movements back and forth. Mood is "depressed." Affect is mood congruent, mainly constricted to negative emotion though able to smile appropriately at times. TP is linear. TC with some paranoid delusions about nursing facility staff. +SI, denies HI, AVH. Insight and judgment are limited. AAOx2. Impulse control is fair.

## 2022-05-23 NOTE — BH INPATIENT PSYCHIATRY PROGRESS NOTE - NSBHFUPINTERVALHXFT_PSY_A_CORE
The pt was seen for f/u depression, suicidality. Her case was reviewed and discussed with the interdisciplinary team. She did not require any PRNs and took all standing medications. Per sleep log, pt slept approximately 7 hours.    This am, pt was seen in the dayroom. Pt with eyes closed, though more responsive than previous encounters. Pt unwilling to open eyes though still engaged with interview. Pt states that she "gives up" and wants to die, though denies any specific plans. She smiles when talking about her daughter, stating she is her "aruna" and her reason for living. Pt expresses hopelessness, anhedonia, amotivation. Pt notes that she has always been depressed and said that she took a job in the past which helped prevent her from committing suicide. She's unable to identify things that previously brought her pineda. Good appetite, fair sleep. No other concerns. The pt was seen for f/u depression, suicidality. Her case was reviewed and discussed with the interdisciplinary team. She did not require any PRNs and took all standing medications. Per sleep log, pt slept approximately 7 hours.    This am, pt was seen in the dayroom. Pt with eyes closed, though more responsive than previous encounters. Pt unwilling to open eyes though still engaged with interview. Pt states that she "gives up" and wants to die, though denies any specific plans. She smiles when talking about her daughter, stating she is her "aruna" and her reason for living. Pt expresses hopelessness, anhedonia, amotivation. Pt notes that she has always been depressed and said that she took a job in the past which helped prevent her from committing suicide. She's unable to identify things that previously brought her pineda. Good appetite, fair sleep. No other concerns.    Collateral obtained from outpatient provider, Dr. Alexander ([phone])- Ms. Albright has been under Dr. Alexander's care for the last 3-4 months. He notes that she has a longstanding history of MS, MDD, and that she was hospitalized a few months ago for catatonia. He denies a hx of previous SAs until two weeks ago when she attempted to cut her wrists with a butter knife. Pt was recently moved to a long term care unit which may have caused her depression to worsen. He states that she had previously been on Wellbutrin though was discontinued as she became more agitated on it. She has also been on a few antipsychotics while at home including Seroquel and Risperdal though was stopped. Unsure of other previous med trials. Currently on Prozac 40 mg daily, Trazodone at bedtime, and klonopin (started after catatonia).  The pt was seen for f/u depression, suicidality. Her case was reviewed and discussed with the interdisciplinary team. She did not require any PRNs and took all standing medications. Per sleep log, pt slept approximately 7 hours.    This am, pt was seen in the dayroom. Pt with eyes closed, though more responsive than previous encounters. Pt unwilling to open eyes though still engaged with interview. Pt states that she "gives up" and wants to die, though denies any specific plans. She smiles when talking about her daughter, stating she is her "aruna" and her reason for living. Pt expresses hopelessness, anhedonia, amotivation. Pt notes that she has always been depressed and said that she took a job in the past which helped prevent her from committing suicide. She's unable to identify things that previously brought her pineda. Good appetite, fair sleep. No other concerns.    Collateral obtained from outpatient provider, Dr. Alexander ([phone])- Ms. Albright has been under Dr. Alexander's care for the last 3-4 months. He notes that she has a longstanding history of MS, MDD, and that she was hospitalized a few months ago for catatonia. He denies a hx of previous SAs until two weeks ago when she attempted to cut her wrists with a butter knife. Pt was recently moved to a long term care unit which may have caused her depression to worsen. He states that she had previously been on Wellbutrin though was discontinued as she became more agitated on it. She has also been on a few antipsychotics while at home including Seroquel and Risperdal though was stopped. Unsure of other previous med trials. Currently on Prozac 40 mg daily, Trazodone at bedtime, and klonopin (started after catatonia).     Spoke with pt's daughter, April - April agreeable to ECT, stating could be helpful for her mother. No other concerns. Difficult to come for visiting hours, but hopefully can video chat if can be arranged.

## 2022-05-23 NOTE — DIETITIAN INITIAL EVALUATION ADULT - NS FNS DIET ORDER
Diet, Soft and Bite Sized:   DASH/TLC {Sodium & Cholesterol Restricted} (DASH)  Supplement Feeding Modality:  Oral  Glucerna Shake Cans or Servings Per Day:  1       Frequency:  Three Times a day (05-20-22 @ 13:50) [Active]

## 2022-05-23 NOTE — DIETITIAN INITIAL EVALUATION ADULT - OTHER INFO
Pt domiciled at NH. Brought to ED due to SI and depression. Per chart review, Pt with depression related to MS and moving into NH. Writer attempted to speak to Pt twice but she pretends to be asleep. Per [facility], Pt ate breakfast this morning. No GI distress noted.

## 2022-05-23 NOTE — BH INPATIENT PSYCHIATRY PROGRESS NOTE - NSBHASSESSSUMMFT_PSY_ALL_CORE
72 year old woman, with daughter, domiciled at [facility] and rehab facility (since November 15 2021-transferred several months ago to memory unit); with  history of MDD and anxiety and no known psychiatric  hospitalizations; questionable suicide attempt 5/12/22; no known history of violence or arrests; no active substance abuse or h/o complicated withdrawal, PMhx Multiple Sclerosis on plegridy q 2 weekly, h/o UTI at present on Bactrim (5/17/22), COPD, DM type II, obesity, primary HTN, DLD, vascular dementia, hyperthyroidism, adult FTT, sent to ER from NH for SI. Patient is A&Ox2 (knows she is in the hospital). She reports a long h/o depression with worsening symptoms the past several months in the context of loss of independence and move to a long term skilled nursing facility. Patient is not able to remember the name of the facility but stated, " I hate it there." Patient currently  endorses symptoms of depression including depressed mood every day throughout the day, poor appetite, low energy, poor concentration and memory, hypersomnia and suicidal ideation. Patient reports if she could find a way to kill herself she would but has not been able to find a way.  Patient does not like living in the nursing home and does not see herself getting any better. She reports she use to be able to ambulate with a walker and is now wheel chair bound. Patient states she would miss her family if she killed herself but states she has experienced extreme loss in the past and knows they will recover.     On assessment today, pt increasingly lethargic, depressed, paranoia, suicidal though no current active plan. Pt AAOx2. Pt requires inpatient admission for further safety and stabilization.     DDx  MDD recurrent, severe with psychotic features   r/o 2/2 medical condition (microvascular dementia vs FTT vs other)  Anxiety disorder NOS  r/o SUMIT    5/23: hopeless, +SI though denies any plans, depressed, able to smile appropriately when discussing daughter/    Plan:  1)Legal- Admit 9.27  2) Safety- Pt requires hospital bed, will need CO from 11pm-7 am.   3)Psychiatric medications: Will continue home Prozac 40 mg daily for mood sx for now. Continue Seroquel 12.5 mg qhs for adjunct therapy. Will continue holding klonopin 0.5 mg daily due to increased sedation. Discontinue trazodone due to her sedation. Consider ECT, discussed with pt's daughter (HCP) who will consider. PRNs: Ativan 0.5 mg PO/IM q6h for agitation. Continue ropinirole as ordered.   4) Medical: Pt with hx of MS, on pegintererfon/plegridy CAAL q2 weeks (daughter states she will bring in during visiting hours). UTI- continue bactrim. Continue lisinopril, hctz for HTN. Continue methimazole 5 mg for hypothyroidism. BMP with Na of 133. Will trend. Consider neuro consult due to hx of MS, potential worsening of sx if adjunct antipsychotics needed.  5) I/g/m therapy as appropriate. DIET- ordered for bite sized due to potential dysphagia, challenges eating. Will re-eval  6) dispo/collateral - pending improvement of sx. Collateral obtained from pt's daughter, April on 5/20. Will obtain collateral from outpatient psychiatrist, Dr. Alexander

## 2022-05-23 NOTE — BH INPATIENT PSYCHIATRY PROGRESS NOTE - NSBHCHARTREVIEWLAB_PSY_A_CORE FT
05-23    133<L>  |  100  |  17  ----------------------------<  131<H>  4.4   |  25  |  0.77    Ca    9.3      23 May 2022 09:55  Phos  3.1     05-23  Mg     1.90     05-23

## 2022-05-24 PROCEDURE — 99233 SBSQ HOSP IP/OBS HIGH 50: CPT

## 2022-05-24 PROCEDURE — 99232 SBSQ HOSP IP/OBS MODERATE 35: CPT

## 2022-05-24 RX ORDER — ALBUTEROL 90 UG/1
2 AEROSOL, METERED ORAL EVERY 4 HOURS
Refills: 0 | Status: DISCONTINUED | OUTPATIENT
Start: 2022-05-24 | End: 2022-05-25

## 2022-05-24 RX ADMIN — Medication 1 TABLET(S): at 08:46

## 2022-05-24 RX ADMIN — LISINOPRIL 20 MILLIGRAM(S): 2.5 TABLET ORAL at 08:45

## 2022-05-24 RX ADMIN — ENOXAPARIN SODIUM 40 MILLIGRAM(S): 100 INJECTION SUBCUTANEOUS at 08:58

## 2022-05-24 RX ADMIN — ATORVASTATIN CALCIUM 40 MILLIGRAM(S): 80 TABLET, FILM COATED ORAL at 21:49

## 2022-05-24 RX ADMIN — Medication 1 TABLET(S): at 21:49

## 2022-05-24 RX ADMIN — ROPINIROLE 2 MILLIGRAM(S): 8 TABLET, FILM COATED, EXTENDED RELEASE ORAL at 21:49

## 2022-05-24 RX ADMIN — QUETIAPINE FUMARATE 12.5 MILLIGRAM(S): 200 TABLET, FILM COATED ORAL at 21:49

## 2022-05-24 RX ADMIN — Medication 40 MILLIGRAM(S): at 08:47

## 2022-05-24 RX ADMIN — Medication 2000 UNIT(S): at 08:46

## 2022-05-24 NOTE — PROGRESS NOTE ADULT - SUBJECTIVE AND OBJECTIVE BOX
CC/Reason for Consult: ECT medical risk stratification    SUBJECTIVE / OVERNIGHT EVENTS:  Patient feels "okay"  She would like to go home. Is aware that psychiatric team is recommending ECT therapy which is she agreeable to. Reports her family is "dead to her" for agreeing to ECT therapy. Patient denies any CP, SOB, lightheadedness, hx of stroke, MI, any implantable devices. Denies any prior complications with anesthesia or surgery.   Has a hx of COPD but denies any SOB, cough, wheezing.   Patient wheelchair bound     MEDICATIONS  (STANDING):  atorvastatin 40 milliGRAM(s) Oral at bedtime  cholecalciferol 2000 Unit(s) Oral daily  enoxaparin Injectable 40 milliGRAM(s) SubCutaneous every 24 hours  FLUoxetine 40 milliGRAM(s) Oral daily  lisinopril 20 milliGRAM(s) Oral daily  methimazole 5 milliGRAM(s) Oral daily  QUEtiapine 12.5 milliGRAM(s) Oral at bedtime  rOPINIRole 2 milliGRAM(s) Oral at bedtime  trimethoprim  160 mG/sulfamethoxazole 800 mG 1 Tablet(s) Oral every 12 hours    MEDICATIONS  (PRN):  LORazepam     Tablet 0.5 milliGRAM(s) Oral every 6 hours PRN agitation  LORazepam   Injectable 0.5 milliGRAM(s) IntraMuscular once PRN agitation      Vital Signs Last 24 Hrs  T(C): 36.4 (24 May 2022 08:01), Max: 36.4 (24 May 2022 08:01)  T(F): 97.5 (24 May 2022 08:01), Max: 97.5 (24 May 2022 08:01)  HR: --  BP: --  BP(mean): 110/61  RR: --  SpO2: --  CAPILLARY BLOOD GLUCOSE            PHYSICAL EXAM:  GENERAL: NAD, well-developed elderly female   HEAD:  Atraumatic, Normocephalic  EYES: EOMI, conjunctiva and sclera clear  NECK: Supple, No JVD  CHEST/LUNG: Clear to auscultation bilaterally; No wheeze  HEART: Regular rate and rhythm; No murmurs, rubs, or gallops  ABDOMEN: Soft, Nontender, Nondistended; Bowel sounds present  EXTREMITIES:  2+ Peripheral Pulses, No clubbing, cyanosis, or edema  PSYCH: AAOx3  NEUROLOGY: non-focal  SKIN: No rashes or lesions    LABS:    05-23    133<L>  |  100  |  17  ----------------------------<  131<H>  4.4   |  25  |  0.77    Ca    9.3      23 May 2022 09:55  Phos  3.1     05-23  Mg     1.90     05-23                RADIOLOGY & ADDITIONAL TESTS:    Imaging Personally Reviewed:    Consultant(s) Notes Reviewed:      Care Discussed with Consultants/Other Providers:   CC/Reason for Consult: ECT medical risk stratification    SUBJECTIVE / OVERNIGHT EVENTS:  Patient feels "okay"  She would like to go home. Is aware that psychiatric team is recommending ECT therapy which is she agreeable to. Reports her family is "dead to her" for agreeing to ECT therapy. Patient denies any CP, SOB, lightheadedness, hx of stroke, MI, any implantable devices. Denies any prior complications with anesthesia or surgery.   Has a hx of COPD but denies any SOB, cough, wheezing.   Patient wheelchair bound     PSH: Leonidas bland- 20 years ago  2 hip and knee replacements     MEDICATIONS  (STANDING):  atorvastatin 40 milliGRAM(s) Oral at bedtime  cholecalciferol 2000 Unit(s) Oral daily  enoxaparin Injectable 40 milliGRAM(s) SubCutaneous every 24 hours  FLUoxetine 40 milliGRAM(s) Oral daily  lisinopril 20 milliGRAM(s) Oral daily  methimazole 5 milliGRAM(s) Oral daily  QUEtiapine 12.5 milliGRAM(s) Oral at bedtime  rOPINIRole 2 milliGRAM(s) Oral at bedtime  trimethoprim  160 mG/sulfamethoxazole 800 mG 1 Tablet(s) Oral every 12 hours    MEDICATIONS  (PRN):  LORazepam     Tablet 0.5 milliGRAM(s) Oral every 6 hours PRN agitation  LORazepam   Injectable 0.5 milliGRAM(s) IntraMuscular once PRN agitation      Vital Signs Last 24 Hrs  T(C): 36.4 (24 May 2022 08:01), Max: 36.4 (24 May 2022 08:01)  T(F): 97.5 (24 May 2022 08:01), Max: 97.5 (24 May 2022 08:01)  HR: --  BP: --  BP(mean): 110/61  RR: --  SpO2: --  CAPILLARY BLOOD GLUCOSE            PHYSICAL EXAM:  GENERAL: NAD, well-developed elderly female   HEAD:  Atraumatic, Normocephalic  EYES: EOMI, conjunctiva and sclera clear  NECK: Supple, No JVD  CHEST/LUNG: Clear to auscultation bilaterally; No wheeze  HEART: Regular rate and rhythm; No murmurs, rubs, or gallops  ABDOMEN: Soft, Nontender, Nondistended; Bowel sounds present  EXTREMITIES:  2+ Peripheral Pulses, No clubbing, cyanosis, or edema  PSYCH: AAOx3  NEUROLOGY: non-focal  SKIN: No rashes or lesions    LABS:    05-23    133<L>  |  100  |  17  ----------------------------<  131<H>  4.4   |  25  |  0.77    Ca    9.3      23 May 2022 09:55  Phos  3.1     05-23  Mg     1.90     05-23                RADIOLOGY & ADDITIONAL TESTS:    Imaging Personally Reviewed:    Consultant(s) Notes Reviewed:      Care Discussed with Consultants/Other Providers:

## 2022-05-24 NOTE — BH INPATIENT PSYCHIATRY PROGRESS NOTE - MSE UNSTRUCTURED FT
Pt appears stated age, poor hygiene and grooming, lethargic, obese. Pt with eyes open during interview. Pt makes poor eye contact, cooperative, calm. Speech is slowed, mainly nonspontaneous, normal rhythm, repetitive. Tremulous tongue movements back and forth. Mood is "depressed." Affect is mood congruent, mainly constricted to negative emotion though able to smile appropriately at times. TP is linear. TC without overt delusions today. +SI, denies HI, AVH. Insight and judgment are limited. AAOx2. Impulse control is fair.

## 2022-05-24 NOTE — BH INPATIENT PSYCHIATRY PROGRESS NOTE - NSBHASSESSSUMMFT_PSY_ALL_CORE
72 year old woman, with daughter, domiciled at [facility] and rehab facility (since November 15 2021-transferred several months ago to memory unit); with  history of MDD and anxiety and no known psychiatric  hospitalizations; questionable suicide attempt 5/12/22; no known history of violence or arrests; no active substance abuse or h/o complicated withdrawal, PMhx Multiple Sclerosis on plegridy q 2 weekly, h/o UTI at present on Bactrim (5/17/22), COPD, DM type II, obesity, primary HTN, DLD, vascular dementia, hyperthyroidism, adult FTT, sent to ER from NH for SI. Patient is A&Ox2 (knows she is in the hospital). She reports a long h/o depression with worsening symptoms the past several months in the context of loss of independence and move to a long term skilled nursing facility. Patient is not able to remember the name of the facility but stated, " I hate it there." Patient currently  endorses symptoms of depression including depressed mood every day throughout the day, poor appetite, low energy, poor concentration and memory, hypersomnia and suicidal ideation. Patient reports if she could find a way to kill herself she would but has not been able to find a way.  Patient does not like living in the nursing home and does not see herself getting any better. She reports she use to be able to ambulate with a walker and is now wheel chair bound. Patient states she would miss her family if she killed herself but states she has experienced extreme loss in the past and knows they will recover.     On assessment today, pt increasingly lethargic, depressed, paranoia, suicidal though no current active plan. Pt AAOx2. Pt requires inpatient admission for further safety and stabilization.     DDx  MDD recurrent, severe with psychotic features   r/o 2/2 medical condition (microvascular dementia vs FTT vs other)  Anxiety disorder NOS  r/o SUMIT    5/23: hopeless, +SI though denies any plans, depressed, able to smile appropriately when discussing daughter/  5/24: helpless, perseverative, assents to ECT    Plan:  1)Legal- Admit 9.27  2) Safety- Pt requires hospital bed, will need CO from 11pm-7 am.   3)Psychiatric medications: Will continue home Prozac 40 mg daily for mood sx for now. Continue Seroquel 12.5 mg qhs for adjunct therapy. Will continue holding klonopin 0.5 mg daily due to increased sedation. Discontinue trazodone due to her sedation. Consider ECT, discussed with pt's daughter (HCP) who is amenable. Will place med consult for ECT clearance. PRNs: Ativan 0.5 mg PO/IM q6h for agitation. Continue ropinirole as ordered.   4) Medical: Pt with hx of MS, on pegintererfon/plegridy CAAL q2 weeks (daughter states she will bring in during visiting hours). UTI- continue bactrim. Continue lisinopril, hctz for HTN. Continue methimazole 5 mg for hypothyroidism. BMP with Na of 133. Will trend. Discussed case with neurology (Dr. Kaiser), no contraindications to ECT at this time  5) I/g/m therapy as appropriate. DIET- ordered for bite sized due to potential dysphagia, challenges eating. Pt agreeable, saying sometimes she has difficulty eating. Will re-eval  6) dispo/collateral - pending improvement of sx. Collateral obtained from pt's daughter, April on 5/20, 5/24. Collateral obtained from Dr. Alexander on 5/23

## 2022-05-24 NOTE — PROGRESS NOTE ADULT - ASSESSMENT
71 y/o Obese F PMHx  vascular dementia (A+Ox1-self), MDD hx of SI, DM HTN, HLD, MS (wheelchair dependent, on Plegridy), hyperthyroidism (on methimazole), COPD not on 02 who presented from [facility] to [facility] and then transferred to [facility] for depression w/ SI.     ECT pre-procedure evaluation:   ECT specific risk assessment: pt without history of increased ICP, aneurysm, active pulmonary disease, valvular disease, CAD, cerebral vascular disease.     Cardiovascular risk assessment:   The patient is at low risk for cardiovascular complications from the low risk procedure, ECT. RCRI score 0    Risk for complication is low. Patient is medically optimized for ECT treatment without further intervention and can proceed with treatment.    Anesthesia specific concerns : None    History of adverse reaction to anesthesia previously: None  Esophageal Assessment: Soft and Bite Sized diet, no aspiration   Known Spine issues: No known issues  CKD: N/A     Further risk reduction will involve medical management of other comorbid conditions: None    # MDD w/ SI  # Vascular dementia  - Cont Fluoxetine, Seroquel, ropinirole  - ECT medical risk stratification: Patient appears to be at low risk for this low risk procedure. No further workup/intervention can lower this risk. Patient is agreeable.   - As per psych    # HTN  BP well controlled, previously on HCTZ and lisinopril   - Cont lisinopril 20 mg daily    # MS  - Next dose of Plegridy reportedly due 5/30, family will bring in medication on 5/30 AM.     # HLD  - statin     # Hyperthyroidism  - Cont methimazole 5 mg daily    # UTI  - Course of Bactrim to end this evening     # COPD   - Patient w/ documented hx of COPD, not currently on any inhalers   - Albuterol PRN    # IBS   - Monitor for diarrhea, had been on Imodium daily in the past    # Restless leg: Ropinirole     DVT ppx  - Cont lovenox 40 sq daily for now    PT/OT

## 2022-05-24 NOTE — BH INPATIENT PSYCHIATRY PROGRESS NOTE - NSBHFUPINTERVALHXFT_PSY_A_CORE
Pt was seen for f/u depression, suicidality. The case was reviewed  and discussed with the interdisciplinary team. She did not require any PRNs and took all standing medications. She slept approximately 8 hours per sleep log.    This am, pt was more alert, following commands, eyes open when meeting with interviewer. Pt repetitively states that she wants to "lie down." Pt unwilling to engage much during interview at this time. When recommendation of ECT was discussed, pt states that she has heard of it and will do "whatever my daughter wants."  Still helpless, wishes to die.

## 2022-05-25 ENCOUNTER — INPATIENT (INPATIENT)
Facility: HOSPITAL | Age: 72
LOS: 23 days | Discharge: NURSING, REHAB OR LONG-TERM CARE | End: 2022-06-18
Attending: STUDENT IN AN ORGANIZED HEALTH CARE EDUCATION/TRAINING PROGRAM | Admitting: STUDENT IN AN ORGANIZED HEALTH CARE EDUCATION/TRAINING PROGRAM
Payer: MEDICARE

## 2022-05-25 VITALS
RESPIRATION RATE: 16 BRPM | HEART RATE: 66 BPM | TEMPERATURE: 98 F | SYSTOLIC BLOOD PRESSURE: 140 MMHG | HEIGHT: 64 IN | DIASTOLIC BLOOD PRESSURE: 74 MMHG | OXYGEN SATURATION: 94 %

## 2022-05-25 VITALS
HEART RATE: 70 BPM | DIASTOLIC BLOOD PRESSURE: 55 MMHG | OXYGEN SATURATION: 99 % | TEMPERATURE: 98 F | SYSTOLIC BLOOD PRESSURE: 142 MMHG | RESPIRATION RATE: 16 BRPM

## 2022-05-25 LAB
ALBUMIN SERPL ELPH-MCNC: 3.9 G/DL — SIGNIFICANT CHANGE UP (ref 3.3–5)
ALP SERPL-CCNC: 63 U/L — SIGNIFICANT CHANGE UP (ref 40–120)
ALT FLD-CCNC: 32 U/L — SIGNIFICANT CHANGE UP (ref 4–33)
ANION GAP SERPL CALC-SCNC: 12 MMOL/L — SIGNIFICANT CHANGE UP (ref 7–14)
APTT BLD: 31.5 SEC — SIGNIFICANT CHANGE UP (ref 27–36.3)
AST SERPL-CCNC: 36 U/L — HIGH (ref 4–32)
BASOPHILS # BLD AUTO: 0.01 K/UL — SIGNIFICANT CHANGE UP (ref 0–0.2)
BASOPHILS NFR BLD AUTO: 0.1 % — SIGNIFICANT CHANGE UP (ref 0–2)
BILIRUB SERPL-MCNC: 0.6 MG/DL — SIGNIFICANT CHANGE UP (ref 0.2–1.2)
BUN SERPL-MCNC: 25 MG/DL — HIGH (ref 7–23)
CALCIUM SERPL-MCNC: 9.5 MG/DL — SIGNIFICANT CHANGE UP (ref 8.4–10.5)
CHLORIDE SERPL-SCNC: 101 MMOL/L — SIGNIFICANT CHANGE UP (ref 98–107)
CO2 SERPL-SCNC: 23 MMOL/L — SIGNIFICANT CHANGE UP (ref 22–31)
CREAT SERPL-MCNC: 0.89 MG/DL — SIGNIFICANT CHANGE UP (ref 0.5–1.3)
EGFR: 68 ML/MIN/1.73M2 — SIGNIFICANT CHANGE UP
EOSINOPHIL # BLD AUTO: 0.08 K/UL — SIGNIFICANT CHANGE UP (ref 0–0.5)
EOSINOPHIL NFR BLD AUTO: 1 % — SIGNIFICANT CHANGE UP (ref 0–6)
GLUCOSE BLDC GLUCOMTR-MCNC: 104 MG/DL — HIGH (ref 70–99)
GLUCOSE SERPL-MCNC: 99 MG/DL — SIGNIFICANT CHANGE UP (ref 70–99)
HCT VFR BLD CALC: 39.9 % — SIGNIFICANT CHANGE UP (ref 34.5–45)
HGB BLD-MCNC: 12.5 G/DL — SIGNIFICANT CHANGE UP (ref 11.5–15.5)
IANC: 5.42 K/UL — SIGNIFICANT CHANGE UP (ref 1.8–7.4)
IMM GRANULOCYTES NFR BLD AUTO: 0.4 % — SIGNIFICANT CHANGE UP (ref 0–1.5)
INR BLD: 1.16 RATIO — SIGNIFICANT CHANGE UP (ref 0.88–1.16)
LYMPHOCYTES # BLD AUTO: 2.03 K/UL — SIGNIFICANT CHANGE UP (ref 1–3.3)
LYMPHOCYTES # BLD AUTO: 24.9 % — SIGNIFICANT CHANGE UP (ref 13–44)
MCHC RBC-ENTMCNC: 30 PG — SIGNIFICANT CHANGE UP (ref 27–34)
MCHC RBC-ENTMCNC: 31.3 GM/DL — LOW (ref 32–36)
MCV RBC AUTO: 95.9 FL — SIGNIFICANT CHANGE UP (ref 80–100)
MONOCYTES # BLD AUTO: 0.59 K/UL — SIGNIFICANT CHANGE UP (ref 0–0.9)
MONOCYTES NFR BLD AUTO: 7.2 % — SIGNIFICANT CHANGE UP (ref 2–14)
NEUTROPHILS # BLD AUTO: 5.42 K/UL — SIGNIFICANT CHANGE UP (ref 1.8–7.4)
NEUTROPHILS NFR BLD AUTO: 66.4 % — SIGNIFICANT CHANGE UP (ref 43–77)
NRBC # BLD: 0 /100 WBCS — SIGNIFICANT CHANGE UP
NRBC # FLD: 0 K/UL — SIGNIFICANT CHANGE UP
PLATELET # BLD AUTO: 225 K/UL — SIGNIFICANT CHANGE UP (ref 150–400)
POTASSIUM SERPL-MCNC: 4.9 MMOL/L — SIGNIFICANT CHANGE UP (ref 3.5–5.3)
POTASSIUM SERPL-SCNC: 4.9 MMOL/L — SIGNIFICANT CHANGE UP (ref 3.5–5.3)
PROT SERPL-MCNC: 7.3 G/DL — SIGNIFICANT CHANGE UP (ref 6–8.3)
PROTHROM AB SERPL-ACNC: 13.5 SEC — HIGH (ref 10.5–13.4)
RBC # BLD: 4.16 M/UL — SIGNIFICANT CHANGE UP (ref 3.8–5.2)
RBC # FLD: 13.9 % — SIGNIFICANT CHANGE UP (ref 10.3–14.5)
SODIUM SERPL-SCNC: 136 MMOL/L — SIGNIFICANT CHANGE UP (ref 135–145)
TROPONIN T, HIGH SENSITIVITY RESULT: 10 NG/L — SIGNIFICANT CHANGE UP
WBC # BLD: 8.16 K/UL — SIGNIFICANT CHANGE UP (ref 3.8–10.5)
WBC # FLD AUTO: 8.16 K/UL — SIGNIFICANT CHANGE UP (ref 3.8–10.5)

## 2022-05-25 PROCEDURE — 99285 EMERGENCY DEPT VISIT HI MDM: CPT

## 2022-05-25 PROCEDURE — 70450 CT HEAD/BRAIN W/O DYE: CPT | Mod: 26,MA

## 2022-05-25 PROCEDURE — 99232 SBSQ HOSP IP/OBS MODERATE 35: CPT

## 2022-05-25 RX ADMIN — QUETIAPINE FUMARATE 12.5 MILLIGRAM(S): 200 TABLET, FILM COATED ORAL at 20:49

## 2022-05-25 RX ADMIN — ATORVASTATIN CALCIUM 40 MILLIGRAM(S): 80 TABLET, FILM COATED ORAL at 20:49

## 2022-05-25 RX ADMIN — Medication 1 TABLET(S): at 08:45

## 2022-05-25 RX ADMIN — ROPINIROLE 2 MILLIGRAM(S): 8 TABLET, FILM COATED, EXTENDED RELEASE ORAL at 20:49

## 2022-05-25 RX ADMIN — Medication 40 MILLIGRAM(S): at 08:45

## 2022-05-25 RX ADMIN — ENOXAPARIN SODIUM 40 MILLIGRAM(S): 100 INJECTION SUBCUTANEOUS at 08:59

## 2022-05-25 RX ADMIN — Medication 2000 UNIT(S): at 08:45

## 2022-05-25 NOTE — ED ADULT NURSE REASSESSMENT NOTE - NS ED NURSE REASSESS COMMENT FT1
Patient received A&O x 0. Responds to questions by slowing shaking her head. Pupils responsive to light. Needs anticipated and met in a timely manner. Stretcher in lowest position, wheels locked, appropriate side rails in place, call bell in reach.

## 2022-05-25 NOTE — STROKE CODE NOTE - IV ALTEPLASE EXCLUSION ABS OTHER
Concern for seizure versus toxic/metabolic/infectious etiology. History and exam not consistent with stroke

## 2022-05-25 NOTE — ED PROVIDER NOTE - ATTENDING CONTRIBUTION TO CARE
73 yo f past medical history HTN, HLD, DM, MS, hyperthyroidism, dementia, copd, anxiety, depression admitted to [facility] for SI presents for brief episode of unresponsiveness, inability to speak. then had abnormal eye and tongue movement prior to returning to her normal. no known trauma. was seen at baseline earlier today at approx 730pm. exam as above. plan: labs, ct, neuro following.

## 2022-05-25 NOTE — CONSULT NOTE ADULT - ATTENDING COMMENTS
Exam: No aphasia, no neglect, no extinction to DSS visual & tactile; stereognosis and graphesthesia intact. Memory 1/3; 3/3 with hints. Normal strength.     A/P  Episode of questionable focal nonmotor onset with impaired awareness seizure.  MRI brain w/ w/o con with epilepsy protocol  24-hour EEG  No indication for AED at this time.     Thank you Exam: No aphasia, no neglect, no extinction to DSS visual & tactile; stereognosis and graphesthesia intact. Memory 1/3; 3/3 with hints. Normal strength.     A/P  Episode of questionable focal nonmotor onset with impaired awareness seizure.  MRI brain w/ w/o con with epilepsy protocol  24-hour EEG  We do not recommend starting any AED at this time for a single uncertain episode.     Thank you

## 2022-05-25 NOTE — BH INPATIENT PSYCHIATRY PROGRESS NOTE - NSBHINPTBILLING_PSY_ALL_CORE
71092 - Inpatient Low Complexity
35565 - Inpatient Low Complexity
95187 - Inpatient Moderate Complexity
91289 - Inpatient Moderate Complexity
00328 - Inpatient Moderate Complexity
67416 - Inpatient Moderate Complexity

## 2022-05-25 NOTE — BH INPATIENT PSYCHIATRY PROGRESS NOTE - MODIFICATIONS
Patient seen by me, chart reviewed, and case discussed with treatment team.  Reviewed and agree with above progress note, assessment and plan; corrections and modification made where appropriate.
NA

## 2022-05-25 NOTE — ECT CONSULT NOTE - NSECTPASTTXTRIALSDETAILS_PSY_ALL_CORE
Per primary team; unknown, but psychiatric and family states she has been on several including risperidone, various antidepressants.

## 2022-05-25 NOTE — BH INPATIENT PSYCHIATRY PROGRESS NOTE - NSBHFUPINTERVALHXFT_PSY_A_CORE
Pt was seen for f/u depression, suicidality. Her case was reviewed and discussed with the interdisciplinary team. She did not require any PRNs and took all standing medications. She slept approximately 7 hours, per sleep log.    Pt seen out in the dayroom this am. Pt more engaged with interviewer this am. She notes that this morning, she woke up feeling more optimistic and hopeful about wanting to walk around with PT's assistance. However, she now states that she is suicidal again and wishes to stab herself. Her appetite was poor this am, states she did not sleep well last night. ECT was brought up with pt again who states she trusts whatever her daughter feels might be a good option for her.

## 2022-05-25 NOTE — BH INPATIENT PSYCHIATRY PROGRESS NOTE - NSBHFUPINTERVALCCFT_PSY_A_CORE
"I feel suicidal" 
The pt. stated that this is the worst place she has ever been to and that she doesn't care if she lives or dies.
"I want to lie down"
The pt. stated that she is more comfortable now that shew is in a hospital but remains depressed and unsure if she wants to live.
"I give up"
"I felt optimistic this morning"

## 2022-05-25 NOTE — ED PROVIDER NOTE - CLINICAL SUMMARY MEDICAL DECISION MAKING FREE TEXT BOX
GURPREET Staton. PGY-3: 71 y/o F presenting with episode of unresponsiveness with gaze deviation and abnormal tongue movements, ?ICH, seizure, CVA/TIA. Consider infectious/metabolic etiology, ?ECT adverse reaction, catatonia. Dispo pending w/u

## 2022-05-25 NOTE — ECT CONSULT NOTE - NSECTASSESSRECOMM_PSY_ALL_CORE
A course of ECT is a reasonable choice.    The bulk of consult time was spent in counselling and/or coordination of care, including but not limited to discussing with patient and daughter April (HCP) the risks and benefits of ECT, the usual trajectory of response with acute course of ECT, obtaining informed consent for ECT, and reviewing ECT fasting guidelines. The risk of permanent worsening of memory was explicitly discussed with the HCP.    Referring psychiatrist was contacted.

## 2022-05-25 NOTE — BH INPATIENT PSYCHIATRY PROGRESS NOTE - NSBHATTESTSEENBY_PSY_A_CORE
General Surgery Operative Note    PREOPERATIVE DIAGNOSIS:  cholecystitis.    POSTOPERATIVE DIAGNOSIS:  Gangrenous, necrotic, purulent cholecystitis.    PROCEDURE:  Laparoscopic Cholecystectomy - modifier 22 for difficulty due to degree of infection     SURGEON:  Annette Lambert MD    ASSISTANT:  Bobbi Chandler PA-C  The physician s assistant was medically necessary for their expertise in camera management, suctioning and retraction.  ANESTHESIA:  General.    BLOOD LOSS: 75ml    FINDINGS: significantly distended gallbladder with partial wall necrosis in areas c/w gangrenous cholecystitis, purulent fluid within gallbladder cultured    INDICATIONS:   Tc is an 80yom w h/o afib on eliquis who presented with sepsis and GNR bacteremia associated with abdominal pain, nausea and vomiting. Imaging revealed acute cholecystitis.    I explained the risks, benefits, complications for laparoscopic cholecystectomy including but not limited to bleeding, infection, possible need to open, possible postop hematoma, seroma, bowel, bladder or bile duct injury, MI, PE, and if any of these occurred the patient would require additional procedures. The patient agreed and did sign consent.    DETAILS OF PROCEDURE:   The patient was brought to the operating room. They were positioned on the operating room table with the left arm tucked at their side. General anesthesia was induced. Perioperative antibiotics were administered. The abdomen was prepped and draped in standard fashion.    A small skin incision was made in the left upper quadrant. A 5mm 0degree laparoscope was used with a visiport to obtain access to the abdomen. Insufflation was connected and flowed freely. Insufflation pressure was sent to 15mmhg. The abdomen was surveyed and revealed changes of recent laparoscopic inguinal hernia repair.  There was no injury from abdominal entrance noted. A 12mm port was placed in the midline just beneath the umbilicus. A 5mm 30 degree laparoscope 
was inserted and revealed no trocar injuries. Local was injected to the upper abdomen and two 5mm ports were placed in the right upper quadrant under direct visualization. The gallbladder was not immediately visible as it was covered with omentum. The omentum was carefully swept down and the gallbladder was identified. It was very distended and the wall had areas of partial thickness necrosis. The gallbladder was aspirated and 40ml of dark, purulent fluid was sent for culture. The gallbladder was then retracted superiorly and laterally. The gallbladder wall was extremely thickened and edematous. Cautery was used to take down the medial and lateral peritoneal attachments. I was able to delineate the artery and duct and got under the undersurface of the gallbladder to help further declinate the duct and artery. This dissection was taken up high along the gallbladder to confirm the critical view on multiple views. There was a moderate amount of bleeding with medial dissection and a vessel was clipped at the midpoint of the gallbladder body. We then returned to the infundibulum and were confident in the anatomy with a large critical view obtained. Two clips were placed proximally on the cystic duct and one distally. Two clips were placed proximally on the artery and one clip distally. Both the cystic artery and cystic duct were then completely transected with laparoscopic scissors. An 0 PDS endoloop was used to further secure the cystic duct given it was very friable tissue.     The gallbladder was taken off the liver bed with cautery. This dissection was difficult as the posterior wall was gangrenous and there was no clear plane. The gallbladder was entered several times and purulent bile/fluid was evacuated with suction irrigation. A very small portion of the posterior wall of the gallbladder was left on the liver near the dome of the gallbladder. The mucosa was cauterized. I was concerned about proximity to possible 
middle hepatic vein and bleeding if I were to try to dissect this small portion free.  The gallbladder was then placed in an Endocatch bag and removed through the umbilical trocar site. The camera was reinserted and copious irrigation was used until clear. Cautery was used for hemostasis in the gallbladder fossa. I also used surgicel powder for added reassurance. A 15french round drain was then placed via our lateral port site and positioned in the gallbladder fossa.  The 12mm port fascia closed with 0 Vicryl in figure-of-eight fashion using the garcia yue device. All gas was expelled and the trocars were taken out under direct visualization. Marcaine 0.5% were injected to all the wounds. The skin was closed with a 4-0 Monocryl in a subcuticular fashion. Steri-strips and sterile dressings were applied. The patient tolerated the procedure well. There were no complications. They were awoken from anesthesia and transferred to PACU in stable condition. All sponge, instrument and needle counts were correct.       EDDIE ALMONTE MD    Please route or send letter to:  Primary Care Provider (PCP) and Referring Provider                  
attending Psychiatrist without NP/Trainee
attending Psychiatrist without NP/Trainee
Attending Psychiatrist supervising NP/Trainee, meeting pt...

## 2022-05-25 NOTE — BH INPATIENT PSYCHIATRY PROGRESS NOTE - NSBHMETABOLIC_PSY_ALL_CORE_FT
BMI:   HbA1c:   Glucose: POCT Blood Glucose.: 96 mg/dL (05-18-22 @ 13:42)    BP: --  Lipid Panel: 
BMI: BMI (kg/m2): 36.4 (05-24-22 @ 14:50)  HbA1c:   Glucose: POCT Blood Glucose.: 96 mg/dL (05-18-22 @ 13:42)    BP: --  Lipid Panel: 
BMI:   HbA1c:   Glucose: POCT Blood Glucose.: 96 mg/dL (05-18-22 @ 13:42)    BP: --  Lipid Panel: 

## 2022-05-25 NOTE — BH INPATIENT PSYCHIATRY PROGRESS NOTE - NSBHCONSDANGERSELF_PSY_A_CORE
suicidal behavior/unable to care for self

## 2022-05-25 NOTE — BH CHART NOTE - NSEVENTNOTEFT_PSY_ALL_CORE
The patient was seen today to assess the appropriateness of a course of ECT treatments.      Briefly, the patient is a     The treatment team feels the patient’s symptoms would benefit from ECT, with little risk.  The patient has been medically cleared for ECT.  The treatment was discussed with the patient and pt's next of kin, her daughter.  The patient’s daughter did agree to the treatment, and would sign the consent on pt's behalf.  Met with the patient today.  The pt shows significant symptoms of depression and was unable to engage in discussion, though stated that she would not have objection to ECT treatment.  It is my view that the patient does not have capacity at this time to decide on a course of ECT treatments, but has not dissented to the treatment.  In this case, the benefits of the treatment outweigh the risks, and, as the family, as health care proxy or next of kin, is also in agreement, that the treatment team can move forward with a course of ECT.

## 2022-05-25 NOTE — BH INPATIENT PSYCHIATRY PROGRESS NOTE - NSTXDCOTHRGOAL_PSY_ALL_CORE
The pt will show resolution of acute symptoms and return to baseline functioning. The pt will be stable and return to [facility] memory unit for her LTC.
The pt will show resolution of acute symptoms and return to baseline functioning. The pt will be stable and return to [facility] memory unit for her LTC.
The pt will show resolution of acute symptoms and return to baseline functioning. The pt will be stable and return to [facility] memory unit for her LTC.

## 2022-05-25 NOTE — ED ADULT TRIAGE NOTE - CHIEF COMPLAINT QUOTE
Pt from [facility], brought in by EMS. Per EMS has change in mental status. LKN was 0730PM. Woke up at 0900PM and mental status altered. At baseline usually A&Ox2, currently responsive to painful stimuli. PMH hypothyroidism, DM, major depression    Code stroke activated per MD Staton

## 2022-05-25 NOTE — ED ADULT NURSE NOTE - CHIEF COMPLAINT QUOTE
Pt from [facility], brought in by EMS. Per EMS has change in mental status. LKN was 0730PM. Woke up at 0900PM and mental status altered. At baseline usually A&Ox2, currently responsive to painful stimuli. PMH hypothyroidism, DM, major depression    Code stroke activated per MD Staton

## 2022-05-25 NOTE — ECT CONSULT NOTE - DESCRIPTION
Multiple Sclerosis, h/o UTI, COPD, DM type II, obesity, primary HTN, hyperlipidemia, vascular dementia, hypothyroidism

## 2022-05-25 NOTE — ECT CONSULT NOTE - NSECTMENTALSTATUSEXAM_PSY_ALL_CORE
Appeared to be sleeping in dayroom but rousable/startles easily  Conscious, cooperative, alert.   Psychomotor retardation.   Good eye contact.   Speech: regular rate and rhythm,   Mood: "Depressed" Affect: appropriate, full range.   Thought Process: vague, evasive  Thought Content: No Death wish, No Suicidal ideation/intent/plan, No homicidal ideation/intent/plan. No delusions   Perception: No hallucinations   Insight and Judgement: limited by mental status  Impulse Control: fair at this time.

## 2022-05-25 NOTE — CONSULT NOTE ADULT - SUBJECTIVE AND OBJECTIVE BOX
Neurology Consultation     HPI: Patient RB is a  73 yo F unknown handedness PMHx (A+Ox1-self), MDD hx of SI, DM HTN, HLD, MS, COPD not on 02, UTI who presents from [facility] for concerns of unresponsiveness. History obtained from EMS. Patient MARGI 7:30PM today when last visited by . Was noted to be groggy at 8:45 PM. Then around 9:05 was noted to have a 15-20 second period of unresponsiveness and tongue moving side to side with possible lateral gaze deviation for which there was concern of seizure. Patient was not responding. On initial ED evaluation patient was also not responding briefly nor following commands. Later would speak few words and appeared encephalopathic. Patient received first session of ECT today.      (Stroke only)  NIHSS: 15  preMRS: 4      PAST MEDICAL & SURGICAL HISTORY:  Major depression    Diabetes    Hypertension    Multiple sclerosis    Hypothyroidism    No significant past surgical history    FAMILY HISTORY:  No pertinent family history in first degree relatives    SOCIAL HISTORY: unable to obtain    MEDICATIONS (HOME):  Home Medications:  cholecalciferol oral tablet: 2000 unit(s) orally once a day (19 May 2022 13:22)  enoxaparin: 40 milligram(s) subcutaneous once a day (19 May 2022 13:22)  FLUoxetine 40 mg oral capsule: 1 cap(s) orally once a day (19 May 2022 02:32)  hydroCHLOROthiazide 25 mg oral tablet: 1 tab(s) orally once a day (19 May 2022 02:32)  lisinopril 20 mg oral tablet: 1 tab(s) orally once a day (19 May 2022 02:32)  loratadine 10 mg oral tablet: 1 tab(s) orally once a day (19 May 2022 13:22)  methIMAzole 5 mg oral tablet: 1 tab(s) orally once a day (19 May 2022 02:32)  Plegridy Pen 125 mcg/0.5 mL subcutaneous solution: 1 dose(s) subcutaneous every 2 weeks - next dose 05/30 (19 May 2022 02:32)  rOPINIRole 2 mg oral tablet: 1 tab(s) orally once a day (at bedtime) (19 May 2022 02:32)  sulfamethoxazole-trimethoprim 800 mg-160 mg oral tablet: 1 tab(s) orally every 12 hours last dose 5/24@1800 (19 May 2022 13:22)  traZODone 100 mg oral tablet: 1 tab(s) orally once a day (at bedtime) (19 May 2022 02:32)    MEDICATIONS  (STANDING):    MEDICATIONS  (PRN):    ALLERGIES/INTOLERANCES:  Allergies  No Known Allergies    Intolerances    VITALS & EXAMINATION:  Vital Signs Last 24 Hrs  T(C): 36.6 (25 May 2022 22:23), Max: 36.9 (25 May 2022 08:04)  T(F): 97.8 (25 May 2022 22:23), Max: 98.5 (25 May 2022 08:04)  HR: 66 (25 May 2022 22:23) (66 - 70)  BP: 140/74 (25 May 2022 22:23) (140/74 - 142/55)  BP(mean): --  RR: 16 (25 May 2022 22:23) (16 - 16)  SpO2: 94% (25 May 2022 22:23) (94% - 99%)     General:  Constitutional: Female, appears stated age, appears encephalopathic, eyes open, mouth open, looking upward, states one- two word responses  Head: Normocephalic; Eyes: clear sclera;    Extremities: No cyanosis; Skin: b/l LE edema  Resp: breathing comfortably     Neurological (>12):  MS: Intermittently awake/alert, difficult to keep aroused. Follows some simple commands.   Language: Speech is clear, fragmented, increased speech latency. Cannot test repetition,  comprehension, registration of words.  CNs: PERRL (R = 3mm, L = 3mm). No sluggishness. VFF to confrontation. EOMI. No adria facial asymmetry b/l, full eye closure strength b/l.      Motor: Normal muscle bulk & decreased tone. No noticeable tremor or seizure. Upper extremities bilaterally holds for 2-3 seconds antigravity then drops. Does not hold either leg antigravity.   Sensation: Inconsistently grimmaces to noxious stimuli   Reflexes:              Biceps(C5)       BR(C6)     Triceps(C7)               Patellar(L4)        Plantar Resp  R	2	          2	             2		        2		    	   L	2	          2	             2		        2		 	     Coordination/ Gait cannot assess    LABORATORY:  CBC   Chem       LFTs   Coagulopathy   Lipid Panel   A1c   Cardiac enzymes     U/A   CSF  Other    STUDIES & IMAGING: (EEG, CT, MR, U/S, TTE/MERRY):     Neurology Consultation     HPI: Patient RB is a  73 yo F unknown handedness PMHx (A+Ox1-self), MDD hx of SI, DM HTN, HLD, MS, COPD not on 02, UTI who presents from [facility] for concerns of unresponsiveness. History obtained from EMS. Patient MARGI 7:30PM today when last visited by . Was noted to be groggy at 8:45 PM. Then around 9:05 was noted to have a 15-20 second period of unresponsiveness and tongue moving side to side with possible lateral gaze deviation for which there was concern of seizure. Patient was not responding. On initial ED evaluation patient was also not responding briefly nor following commands. Later would speak few words and appeared encephalopathic with decreased level of consciousness. On re-evaluation, was slightly more awake but still responding in few words. Unclear if now baseline. Patient received first session of ECT today.      (Stroke only)  NIHSS: 15  preMRS: 4      PAST MEDICAL & SURGICAL HISTORY:  Major depression    Diabetes    Hypertension    Multiple sclerosis    Hypothyroidism    No significant past surgical history    FAMILY HISTORY:  No pertinent family history in first degree relatives    SOCIAL HISTORY: unable to obtain    MEDICATIONS (HOME):  Home Medications:  cholecalciferol oral tablet: 2000 unit(s) orally once a day (19 May 2022 13:22)  enoxaparin: 40 milligram(s) subcutaneous once a day (19 May 2022 13:22)  FLUoxetine 40 mg oral capsule: 1 cap(s) orally once a day (19 May 2022 02:32)  hydroCHLOROthiazide 25 mg oral tablet: 1 tab(s) orally once a day (19 May 2022 02:32)  lisinopril 20 mg oral tablet: 1 tab(s) orally once a day (19 May 2022 02:32)  loratadine 10 mg oral tablet: 1 tab(s) orally once a day (19 May 2022 13:22)  methIMAzole 5 mg oral tablet: 1 tab(s) orally once a day (19 May 2022 02:32)  Plegridy Pen 125 mcg/0.5 mL subcutaneous solution: 1 dose(s) subcutaneous every 2 weeks - next dose 05/30 (19 May 2022 02:32)  rOPINIRole 2 mg oral tablet: 1 tab(s) orally once a day (at bedtime) (19 May 2022 02:32)  sulfamethoxazole-trimethoprim 800 mg-160 mg oral tablet: 1 tab(s) orally every 12 hours last dose 5/24@1800 (19 May 2022 13:22)  traZODone 100 mg oral tablet: 1 tab(s) orally once a day (at bedtime) (19 May 2022 02:32)    MEDICATIONS  (STANDING):    MEDICATIONS  (PRN):    ALLERGIES/INTOLERANCES:  Allergies  No Known Allergies    Intolerances    VITALS & EXAMINATION:  Vital Signs Last 24 Hrs  T(C): 36.6 (25 May 2022 22:23), Max: 36.9 (25 May 2022 08:04)  T(F): 97.8 (25 May 2022 22:23), Max: 98.5 (25 May 2022 08:04)  HR: 66 (25 May 2022 22:23) (66 - 70)  BP: 140/74 (25 May 2022 22:23) (140/74 - 142/55)  BP(mean): --  RR: 16 (25 May 2022 22:23) (16 - 16)  SpO2: 94% (25 May 2022 22:23) (94% - 99%)     General:  Constitutional: Female, appears stated age, appears encephalopathic, eyes open, mouth open, no current gaze deviation, states one- two word responses  Head: Normocephalic; Eyes: clear sclera;    Extremities: No cyanosis; Skin: b/l LE edema  Resp: breathing comfortably     Neurological (>12):  MS: Intermittently awake/alert, difficult to keep aroused. Follows some simple commands.   Language: Speech is clear, fragmented, increased speech latency. Cannot test repetition,  comprehension, registration of words.  CNs: PERRL (R = 3mm, L = 3mm). No sluggishness. VFF to confrontation. EOMI. No nystagmus. No adria facial asymmetry b/l, full eye closure strength b/l.      Motor: Normal muscle bulk & decreased tone. No noticeable tremor or seizure. Upper extremities bilaterally holds for 2-3 seconds antigravity then drops. Does not hold either leg antigravity.   Sensation: Inconsistently grimmaces to noxious stimuli   Reflexes:              Biceps(C5)       BR(C6)     Triceps(C7)               Patellar(L4)        Plantar Resp  R	2	          2	             1		        -		    	   L	2	          2	             1		        -		 	     Coordination/ Gait cannot assess    LABORATORY:  cbc  chem    LFTs   Coagulopathy   Lipid Panel   A1c   Cardiac enzymes     U/A   CSF  Other    STUDIES & IMAGING: (EEG, CT, MR, U/S, TTE/MERRY):    < from: CT Brain Stroke Protocol (05.25.22 @ 22:45) >    ACC: [id number] EXAM:  CT BRAIN STROKE PROTOCOL                          PROCEDURE DATE:  05/25/2022          INTERPRETATION:  CLINICAL INFORMATION: Unresponsiveness, gait deviation,   abnormal tongue movements. Code stroke.    TECHNIQUE: Noncontrastaxial CT images were acquired through the head.   Two-dimensional sagittal and coronal reformats were generated.    COMPARISON : CT head 5/18/2022.    FINDINGS:    No acute intracranial mass-effect, hemorrhage, midline shift, or abnormal   extra-axial fluid collection.    Patchy and confluent regions of periventricular and deep cerebral white   matter hypoattenuation due to chronic microangiopathic ischemic changes.   Atheromatous calcifications along the carotid siphons are present.    Mild centrally predominant cerebral volume loss noted.  No evidence of   hydrocephalus. Basal cisterns are patent.    Bilateral maxillary sinus polyps versus retention cysts. Bilateral   mastoid air cells are clear. Calvarium is intact.    IMPRESSION:    No acute intracranial bleeding.  Advanced chronic microvascular ischemic change.    Dr. Barlow was informed by Dr. Bagley with read back confirmation on   5/25/2022 at 10:48 PM.    --- End of Report ---    VIBHA BAGLEY MD; Resident Radiology  This document has been electronically signed.  MOON VERMA MD; Attending Radiologist  This document has been electronically signed. May 25 2022 11:00PM    < end of copied text >

## 2022-05-25 NOTE — BH INPATIENT PSYCHIATRY PROGRESS NOTE - PRN MEDS
MEDICATIONS  (PRN):  ALBUTerol    90 MICROgram(s) HFA Inhaler 2 Puff(s) Inhalation every 4 hours PRN Shortness of Breath and/or Wheezing  LORazepam     Tablet 0.5 milliGRAM(s) Oral every 6 hours PRN agitation  LORazepam   Injectable 0.5 milliGRAM(s) IntraMuscular once PRN agitation

## 2022-05-25 NOTE — BH INPATIENT PSYCHIATRY PROGRESS NOTE - CASE SUMMARY
Agree with assessment and plan of resident. Patient non verbal with writer. Plan cont to titrate Seroquel, consider restarting a BDZ likely ativan if suspicioun for catatonia. Consider given Ativan as test dose. Will cont with idea of offering ECT after further discussions with patient and family
The patient continues to have predominant depressed mood, although she reports small interval of improvement this morning.  She continues to have SI without clear plan on the unit.  Behavior has been well controlled.  She continues to be agreeable to course of ECT.  Will pursue course of ECT.  Continue current medications.
agree with assessment and plan of resident. Patient not assessed as needing CO for suicidality will need CO at night for hospital bed. Will cont prozac, stop trazodone, add Seroquel, attempt to reach outpatient provider.  Use Ativan prn. Stop Klonopin. Watch for signs of catatonia, if so start standing Ativan. Recommended ECT as possibility to family they expressed interest
Agree with zeldae. Patient somewht more verbal, still endorsing sad mood passive SI, anhedonia. Patient stated she would agree to ECT since her daughter checked into it and concluded it is a good treatment for her depression. Will initiate med clearance and ECT team eval. Since patient is assenting and cog impairement and depressive withdrawal limiting ability to provide full consent will go through process of getting of getting consent from HCP

## 2022-05-25 NOTE — CONSULT NOTE ADULT - ASSESSMENT
LKW: 7:30PM today 5/25 after info from EMS  NIHSS: 15   Baseline MRS: 4   Not a tPA candidate due to low suspicion for stroke causing symptoms. More likely seizure versus toxic/metabolic/infectious process.  Not a thrombectomy candidate as CT angio was deferred in setting of contrast shortage given low suspicion stroke was causing symptoms. More likely seizure versus toxic/metabolic/infectious process.      CT head showed:     Impression:      Recommendations:  [] Can hold off on starting antiplatelet agents if not already from stroke prophylactic perspective  [] Atorvastatin 40-80 mg daily (long-term goal and titrate to LDL < 70)  [] HgbA1C, fasting lipid panel, CBC, CMP, coag panel, troponin, esr, crp, infectious workup per primary team  [] MRI brain w/ w/o con epilepsy protocol if symptoms do not improve  [] 24 hour EEG      - Tight glucose control (long-term goal HgbA1c < 6%)  - Neuro-checks and VS q4h  - Dysphagia screen. If fails, speech/swallow eval  - aspiration, fall, seizure precautions  - STAT CT head non-contrast for change in neuro exam.   - PT/ OT / CM/ SW evaluations  - DVT ppx per primary team     Discussed with stroke attending       regarding decision against candidacy for tPA/ thrombectomy. Will be formally staffed on morning rounds with attending. Recommendations will be complete once signed by attending.     Patient RB is a  73 yo F unknown handedness PMHx (A+Ox1-self), MDD hx of SI, DM HTN, HLD, MS, COPD not on 02, UTI who presents from [facility] for concerns of unresponsiveness. History obtained from EMS. Patient LKN 7:30PM today when last visited by . Was noted to be groggy at 8:45 PM. Then around 9:05 was noted to have a 15-20 second period of unresponsiveness and tongue moving side to side with possible lateral gaze deviation for which there was concern of seizure. Patient was not responding. On initial ED evaluation patient was also not responding briefly nor following commands. Later would speak few words and appeared encephalopathic with decreased level of consciousness. On re-evaluation, was slightly more awake but still responding in few words. Unclear if now baseline. Patient received first session of ECT today.      LKW: 7:30PM today 5/25 after info from EMS  NIHSS: 15   Baseline MRS: 4   Not a tPA candidate due to low suspicion for stroke causing symptoms. More likely seizure versus toxic/metabolic/infectious process.  Not a thrombectomy candidate as CT angio was deferred in setting of contrast shortage given low suspicion stroke was causing symptoms. More likely seizure versus toxic/metabolic/infectious process.    Impression:  Concern for toxic/metabolic/infectious process causing encephalopathy versus nonconvulsive seizure event. Also potentially precipitated by recent ECT therapy. Low suspicion for stroke given nonlateralizing deficits and history above.    Recommendations:  [] Can hold off on starting antiplatelet agents if not already from stroke prophylactic perspective  [] Atorvastatin 40-80 mg daily (long-term goal and titrate to LDL < 70)  [] CBC, CMP, Mg, P, CpK, UA, Utox, ammonia, troponin, VBG/lactate, basic infectious workup, HgbA1C, fasting lipid panel, coag panel, esr, crp, infectious workup per primary team  [] MRI brain w/ w/o con epilepsy protocol if symptoms do not improve  [] 24 hour EEG      - Tight glucose control (long-term goal HgbA1c < 6%)  - Neuro-checks and VS q4h  - NPO w/ dysphagia screen once mental status improves  - aspiration, fall, seizure precautions  - STAT CT head non-contrast for change in neuro exam.   - PT/ OT / CM/ SW evaluations  - DVT ppx per primary team   - Given concern for seizure, advise patient to not drive, operate heavy machinery, avoid heights, pools, bathtubs, locked doors until cleared by further follow up outpatient by neurology.   - Please note: if patient has a convulsion, please document length of episode, specifically what patient was doing paying attention to eye opening vs closure, gaze deviation, shaking of extremities, tongue bite, urinary incontinence, any derangement of vital signs. Generalized motor seizures can have dilated and unreactive pupils, absent oculocephalic reflexes (no dolls eyes), and open eyelids (99% of cases). Head turning from sided to side, pelvic thrusting, bicycling movements, hand waving are NOT manifestations of generalized motor seizures.    Discussed with stroke attending Dr Demetria Egan regarding decision against candidacy for tPA/ thrombectomy. Will be formally staffed on morning rounds with attending. Recommendations will be complete once signed by attending.              Patient RB is a  71 yo F unknown handedness PMHx (A+Ox1-self), MDD hx of SI, DM HTN, HLD, MS, COPD not on 02, UTI who presents from [facility] for concerns of unresponsiveness. Patient LKN 7:30PM today when last visited by . Around 9:05 was noted to have a 15-20 second period of unresponsiveness and tongue moving side to side with possible lateral gaze deviation for which there was concern of seizure. Had received ECT today. VS 97.8F, HR66, /74, RR16, 94%RA. Labs: no leukocytosis cbc unremarkable, FS 90-100s. CT head w/ advanced chronic microvascular ischemic changes.     LKW: 7:30PM today 5/25 after info from EMS  NIHSS: 15   Baseline MRS: 4   Not a tPA candidate due to low suspicion for stroke causing symptoms. More likely seizure versus toxic/metabolic/infectious process.  Not a thrombectomy candidate as CT angio was deferred in setting of contrast shortage given low suspicion stroke was causing symptoms. More likely seizure versus toxic/metabolic/infectious process.    Impression:  Concern for toxic/metabolic/infectious process causing encephalopathy versus nonconvulsive seizure event potentially precipitated by recent ECT therapy. Low suspicion for stroke given nonlateralizing deficits and history above. Although concern for recent nonconvulsive seizure event, there currently appears not objective evidence (VS changes, tonic/clonic movements, abnormal pupil changes, tongue biting) will hold off on loading with AED now until more workup is pursued in order to prevent worsening of her mental status.      Recommendations:  [] Can hold off on starting antiplatelet agents if not already from stroke prophylactic perspective  [] Atorvastatin 40-80 mg daily (long-term goal and titrate to LDL < 70)  [] CBC, CMP, Mg, P, CpK, UA, Utox, ammonia, troponin, VBG/lactate, basic infectious workup, HgbA1C, fasting lipid panel, coag panel, esr, crp, infectious workup per primary team  [] MRI brain w/ w/o con epilepsy protocol if symptoms do not improve  [] 24 hour EEG      - Tight glucose control (long-term goal HgbA1c < 6%)  - Neuro-checks and VS q4h  - NPO w/ dysphagia screen once mental status improves  - aspiration, fall, seizure precautions  - STAT CT head non-contrast for change in neuro exam.   - PT/ OT / CM/ SW evaluations  - DVT ppx per primary team   - Given concern for seizure, advise patient to not drive, operate heavy machinery, avoid heights, pools, bathtubs, locked doors until cleared by further follow up outpatient by neurology.   - Please note: if patient has a convulsion, please document length of episode, specifically what patient was doing paying attention to eye opening vs closure, gaze deviation, shaking of extremities, tongue bite, urinary incontinence, any derangement of vital signs. Generalized motor seizures can have dilated and unreactive pupils, absent oculocephalic reflexes (no dolls eyes), and open eyelids (99% of cases). Head turning from sided to side, pelvic thrusting, bicycling movements, hand waving are NOT manifestations of generalized motor seizures.    Discussed with stroke attending Dr Demetria Egan regarding decision against candidacy for tPA/ thrombectomy. Will be formally staffed on morning rounds with attending. Recommendations will be complete once signed by attending.              Patient RB is a  71 yo F unknown handedness PMHx (A+Ox1-self), MDD hx of SI, DM HTN, HLD, MS, COPD not on 02, UTI who presents from [facility] for concerns of unresponsiveness. Patient LKN 7:30PM today when last visited by . Around 9:05 was noted to have a 15-20 second period of unresponsiveness and tongue moving side to side with possible lateral gaze deviation for which there was concern of seizure. Had received ECT today. VS 97.8F, HR66, /74, RR16, 94%RA. Labs: no leukocytosis cbc unremarkable, FS 90-100s. CT head w/ advanced chronic microvascular ischemic changes.     LKW: 7:30PM today 5/25 after info from EMS  NIHSS: 15   Baseline MRS: 4   Not a tPA candidate due to low suspicion for stroke causing symptoms. More likely seizure versus toxic/metabolic/infectious process.  Not a thrombectomy candidate as CT angio was deferred in setting of contrast shortage given low suspicion stroke was causing symptoms. More likely seizure versus toxic/metabolic/infectious process.    Impression:  Concern for nonconvulsive seizure event potentially precipitated by recent ECT therapy versus toxic/metabolic/infectious process causing encephalopathy. Low suspicion for stroke given nonlateralizing deficits and history above. Although concern for recent nonconvulsive seizure event, there currently appears not objective evidence (VS changes, tonic/clonic movements, abnormal pupil changes, tongue biting) will hold off on loading with AED now until more workup is pursued in order to prevent worsening of her mental status.      Recommendations:  [] Can hold off on starting antiplatelet agents if not already from stroke prophylactic perspective  [] Atorvastatin 40-80 mg daily (long-term goal and titrate to LDL < 70)  [] CBC, CMP, Mg, P, CpK, UA, Utox, ammonia, troponin, VBG/lactate, basic infectious workup, HgbA1C, fasting lipid panel, coag panel, esr, crp, infectious workup per primary team  [] MRI brain w/ w/o con epilepsy protocol if symptoms do not improve  [] 24 hour EEG      - Tight glucose control (long-term goal HgbA1c < 6%)  - Neuro-checks and VS q4h  - NPO w/ dysphagia screen once mental status improves  - aspiration, fall, seizure precautions  - STAT CT head non-contrast for change in neuro exam.   - PT/ OT / CM/ SW evaluations  - DVT ppx per primary team   - Given concern for seizure, advise patient to not drive, operate heavy machinery, avoid heights, pools, bathtubs, locked doors until cleared by further follow up outpatient by neurology.   - Please note: if patient has a convulsion, please document length of episode, specifically what patient was doing paying attention to eye opening vs closure, gaze deviation, shaking of extremities, tongue bite, urinary incontinence, any derangement of vital signs. Generalized motor seizures can have dilated and unreactive pupils, absent oculocephalic reflexes (no dolls eyes), and open eyelids (99% of cases). Head turning from sided to side, pelvic thrusting, bicycling movements, hand waving are NOT manifestations of generalized motor seizures.    Discussed with stroke attending Dr Demetria Egan regarding decision against candidacy for tPA/ thrombectomy. Will be formally staffed on morning rounds with attending. Recommendations will be complete once signed by attending.              Patient RB is a  73 yo F unknown handedness PMHx (A+Ox1-self), MDD hx of SI, DM HTN, HLD, MS, COPD not on 02, UTI who presents from [facility] for concerns of unresponsiveness. Patient LKN 7:30PM today when last visited by . Around 9:05 was noted to have a 15-20 second period of unresponsiveness and tongue moving side to side with possible lateral gaze deviation for which there was concern of seizure. Had received ECT today. VS 97.8F, HR66, /74, RR16, 94%RA. Labs: no leukocytosis cbc unremarkable, FS 90-100s. CT head w/ advanced chronic microvascular ischemic changes.     LKW: 7:30PM today 5/25 after info from EMS  NIHSS: 15   Baseline MRS: 4   Not a tPA candidate due to low suspicion for stroke causing symptoms. More likely seizure versus toxic/metabolic/infectious process.  Not a thrombectomy candidate as CT angio was deferred in setting of contrast shortage given low suspicion stroke was causing symptoms. More likely seizure versus toxic/metabolic/infectious process.    Impression:  Concern for nonconvulsive seizure event potentially precipitated by recent ECT therapy versus toxic/metabolic/infectious process causing encephalopathy. Low suspicion for stroke given nonlateralizing deficits and history above. Although concern for recent nonconvulsive seizure event, there currently appears not objective evidence (VS changes, tonic/clonic movements, abnormal pupil changes, tongue biting) will hold off on loading with AED now until more workup is pursued in order to prevent worsening of her mental status.      Recommendations:  [] Can hold off on starting antiplatelet agents if not already from stroke prophylactic perspective  [] Atorvastatin 40-80 mg daily (long-term goal and titrate to LDL < 70)  [] CBC, CMP, Mg, P, CpK, UA, Utox, ammonia, troponin, VBG/lactate, basic infectious workup, HgbA1C, fasting lipid panel, coag panel, esr, crp, infectious workup per primary team  [] MRI brain w/ w/o con epilepsy protocol if symptoms do not improve  [] 24 hour EEG    [] If witness a generalized tonic (increased tone) / clonic (shaking) episode lasting >3 min or with vital sign changes, can consider ativan 1mg x1 IV. Please call neurology prior to administering.     - Tight glucose control (long-term goal HgbA1c < 6%)  - Neuro-checks and VS q4h  - NPO w/ dysphagia screen once mental status improves  - aspiration, fall, seizure precautions  - STAT CT head non-contrast for change in neuro exam.   - PT/ OT / CM/ SW evaluations  - DVT ppx per primary team   - Given concern for seizure, advise patient to not drive, operate heavy machinery, avoid heights, pools, bathtubs, locked doors until cleared by further follow up outpatient by neurology.   - Please note: if patient has a convulsion, please document length of episode, specifically what patient was doing paying attention to eye opening vs closure, gaze deviation, shaking of extremities, tongue bite, urinary incontinence, any derangement of vital signs. Generalized motor seizures can have dilated and unreactive pupils, absent oculocephalic reflexes (no dolls eyes), and open eyelids (99% of cases). Head turning from sided to side, pelvic thrusting, bicycling movements, hand waving are NOT manifestations of generalized motor seizures.    Discussed with stroke attending Dr Demetria Egan regarding decision against candidacy for tPA/ thrombectomy. Will be formally staffed on morning rounds with attending. Recommendations will be complete once signed by attending.

## 2022-05-25 NOTE — BH INPATIENT PSYCHIATRY PROGRESS NOTE - NSICDXBHPRIMARYDX_PSY_ALL_CORE
MDD (major depressive disorder), recurrent episode, severe   F33.2  

## 2022-05-25 NOTE — ED PROVIDER NOTE - OBJECTIVE STATEMENT
73 y/o F with PMH of HTn, HLD, DM, MS (wheelchair dependent on Plegridy), hyperthyroidism, vascular dementia (AAO to self), COPD transferred from [facility] after period of unresponsiveness. Patient reportedly unresponsive for 10-15 seconds with eye rolling and tongue moving back and forth. Patient initially not responsive on exam, moving tongue back and forth and R eye gaze deviation but suddenly stopped and began to answer questions and follow commands. Patient reported R sided weakness but no HA, vision changes, cp, sob, n/v/d, abd pain.  Patient admitted to [facility] for MDD, had first ECT today.  Limited hx given dementia, obtained from chart notes and EMS report

## 2022-05-25 NOTE — BH INPATIENT PSYCHIATRY PROGRESS NOTE - MSE UNSTRUCTURED FT
Pt appears stated age, fair hygiene and grooming, lethargic, obese. Pt with eyes open during interview, more alert. Pt makes poor eye contact, cooperative, calm. Speech is normal rate, somewhat more spontaneous, normal rhythm, repetitive. Tremulous tongue movements back and forth. Mood is "bad." Affect is mood congruent, mainly constricted to negative emotion though able to smile appropriately at times. TP is linear. TC without overt delusions today. +SI, denies HI, AVH. Insight and judgment are limited. AAOx2. Impulse control is fair.

## 2022-05-25 NOTE — ECT CONSULT NOTE - OTHER PAST PSYCHIATRIC HISTORY (INCLUDE DETAILS REGARDING ONSET, COURSE OF ILLNESS, INPATIENT/OUTPATIENT TREATMENT)
As per the medical record and interview with the patient on the psychiatric simms today, 72 year old  mother of an adult daughter, domiciled at [facility] memory unit); PPH anxiety and depressive sx's, no known psychiatric  hospitalizations, suicide attempts, history of violence or arrests; no active substance abuse or h/o complicated withdrawal, CC possible suicidal ideation after being found attempting to cut her wrist with a butter knife.     In ED patient reported "dealing with depression for most of her adult life."  In the week prior to admission, at the residence dining room she passed out while eating and was thought to have a vasovagal episode.  On admission she had attempted to cut her wrist with a butter knife and was sent for evaluation. She was noted to have a red gretchen but did not break skin. .  Patient also reportedly made statements that she wants to die at that time. She has not been a behavioral problem. Her lifelong depressive symptoms have been acutely worsening in the context of loss of independence that came with a move to a long-term skilled nursing facility. Pt currently expresses severe hopelessness, low energy, fatigue, suicidality. Pt assents to procedure.

## 2022-05-25 NOTE — ED ADULT NURSE NOTE - OBJECTIVE STATEMENT
Pt A&Ox2 as per EMS Pt baseline, pt wheelchair bound. PT in NAD, resp equal and unlabored. Pmhx: MS, dementia, COPD, HLD, HTN, DM. Pt admitted to [facility] for depression. Around 8:45pm pt had an episode pf unresponsiveness for 10-15 seconds and then pt eyes rolled back and  mad rapid tongue movements. Pt arrives responsive to verbal stimuli, attempting to follow commands. 18G to L wrist by EMS, RN placed 18G to R wrist. Pt contraindicated to dysphagia water trials due to inability to self position upright.

## 2022-05-25 NOTE — BH INPATIENT PSYCHIATRY PROGRESS NOTE - NSBHASSESSSUMMFT_PSY_ALL_CORE
72 year old woman, with daughter, domiciled at [facility] and rehab facility (since November 15 2021-transferred several months ago to memory unit); with  history of MDD and anxiety and no known psychiatric  hospitalizations; questionable suicide attempt 5/12/22; no known history of violence or arrests; no active substance abuse or h/o complicated withdrawal, PMhx Multiple Sclerosis on plegridy q 2 weekly, h/o UTI at present on Bactrim (5/17/22), COPD, DM type II, obesity, primary HTN, DLD, vascular dementia, hyperthyroidism, adult FTT, sent to ER from NH for SI. Patient is A&Ox2 (knows she is in the hospital). She reports a long h/o depression with worsening symptoms the past several months in the context of loss of independence and move to a long term skilled nursing facility. Patient is not able to remember the name of the facility but stated, " I hate it there." Patient currently  endorses symptoms of depression including depressed mood every day throughout the day, poor appetite, low energy, poor concentration and memory, hypersomnia and suicidal ideation. Patient reports if she could find a way to kill herself she would but has not been able to find a way.  Patient does not like living in the nursing home and does not see herself getting any better. She reports she use to be able to ambulate with a walker and is now wheel chair bound. Patient states she would miss her family if she killed herself but states she has experienced extreme loss in the past and knows they will recover.     On assessment today, pt increasingly lethargic, depressed, paranoia, suicidal though no current active plan. Pt AAOx2. Pt requires inpatient admission for further safety and stabilization.     DDx  MDD recurrent, severe with psychotic features   r/o 2/2 medical condition (microvascular dementia vs FTT vs other)  Anxiety disorder NOS  r/o SUMIT  r/o cluster B triats    5/23: hopeless, +SI though denies any plans, depressed, able to smile appropriately when discussing daughter/  5/24: helpless, perseverative, assents to ECT  5/25: some hopefulness this am, though currently states she feels suicidal     Plan:  1)Legal- Admit 9.27  2) Safety- Pt requires hospital bed, will need CO from 11pm-7 am.   3)Psychiatric medications: Will continue home Prozac 40 mg daily for mood sx for now. Continue Seroquel 12.5 mg qhs for adjunct therapy. Will continue holding klonopin 0.5 mg daily due to increased sedation. Discontinue trazodone due to her sedation. ECT discussed with pt's daughter (HCP) who is amenable and will consent. Pt medically cleared for ECT. ECT consult placed, pending eval.  PRNs: Ativan 0.5 mg PO/IM q6h for agitation. Continue ropinirole as ordered.   4) Medical: Pt with hx of MS, on pegintererfon/plegridy CAAL q2 weeks (daughter states she will bring in during visiting hours). UTI- continue bactrim, stop today Continue lisinopril, hctz for HTN. Continue methimazole 5 mg for hypothyroidism. BMP with Na of 133. Will trend, repeat ordered for 5/26 am. Discussed case with neurology (Dr. Kaiser), no contraindications to ECT at this time  5) I/g/m therapy as appropriate. DIET- ordered for bite sized due to potential dysphagia, challenges eating. Pt agreeable, saying sometimes she has difficulty eating. Will re-eval if needed  6) dispo/collateral - pending improvement of sx. Collateral obtained from pt's daughter, April on 5/20, 5/24. Collateral obtained from Dr. Alexander on 5/23

## 2022-05-25 NOTE — ED PROVIDER NOTE - PHYSICAL EXAMINATION
gen: obese  Mentation: AAO x 1  psych: mood appropriate  ENT: airway patent  Eyes: conjunctivae clear bilaterally  Cardio: RRR, no m/r/g  Resp: normal BS b/l  GI: s/nt/nd  : no CVA tenderness  Neuro: full strength of b/l upper extremities, decreased strength of b/l LE  MSK: normal movement of all extremities  Lymph/Vasc: no LE edema gen: obese  Mentation: AAO x 1  psych: mood appropriate  ENT: airway patent  Eyes: conjunctivae clear bilaterally  Cardio: RRR, no m/r/g  Resp: normal BS b/l  GI: s/nt/nd  : no CVA tenderness  Neuro: full strength of b/l upper extremities, decreased strength of b/l LE, cn 2-12 intact, unable to assess gait  MSK: normal movement of all extremities  Lymph/Vasc: no LE edema

## 2022-05-25 NOTE — CHART NOTE - NSCHARTNOTEFT_GEN_A_CORE
[facility] Inpatient to ED Transfer Summary    Reason for Transfer/Medical Summary: 73 y/o Obese F PMHx Dementia (A+Ox1-self), MDD hx of SI, DM HTN, HLD, MS (wheelchair dependent, on Plegridy), hyperthyroidism (on methimazole), COPD not on 02.  Pt. had episode of loss of consciousness with possible seizure lasting approximately 10-15 seconds, eyes rolling back and tongue twitching noted. Post ictal patient back to baseline, reports feeling weak. She had ECT today. Patient denies history of seizures. Denies chest pain, SOB, lightheadedness/ dizziness, or HA. VSS, . Will transfer to [facility] ED for further work up.  Multiple attempts made to reach patient's daughter, April @ [phone].     PAST MEDICAL & SURGICAL HISTORY:   Major depression    Diabetes    Hypertension    Multiple sclerosis    Hypothyroidism  No significant past surgical history    ALLERGIES:    No Known Allergies    Intolerances    MEDICATIONS  (STANDING):  atorvastatin 40 milliGRAM(s) Oral at bedtime  cholecalciferol 2000 Unit(s) Oral daily  enoxaparin Injectable 40 milliGRAM(s) SubCutaneous every 24 hours  FLUoxetine 40 milliGRAM(s) Oral daily  lisinopril 20 milliGRAM(s) Oral daily  methimazole 5 milliGRAM(s) Oral daily  QUEtiapine 12.5 milliGRAM(s) Oral at bedtime  rOPINIRole 2 milliGRAM(s) Oral at bedtime  trimethoprim  160 mG/sulfamethoxazole 800 mG 1 Tablet(s) Oral every 12 hours    MEDICATIONS  (PRN):  ALBUTerol    90 MICROgram(s) HFA Inhaler 2 Puff(s) Inhalation every 4 hours PRN Shortness of Breath and/or Wheezing  LORazepam     Tablet 0.5 milliGRAM(s) Oral every 6 hours PRN agitation  LORazepam   Injectable 0.5 milliGRAM(s) IntraMuscular once PRN agitation    CAPILLARY BLOOD GLUCOSE    POCT Blood Glucose.: 104 mg/dL (25 May 2022 21:08)    Vital Signs Last 24 Hrs  Temp: 98.2  HR: 67  BP: 142/66  BP(mean): --  RR: 16  SpO2: 98% on RA    PHYSICAL EXAM:  GENERAL: NAD, well-developed  HEAD:  Atraumatic, Normocephalic  EYES: EOMI, PERRLA, conjunctiva and sclera clear  NECK: Supple, No JVD  CHEST/LUNG: Clear to auscultation bilaterally; No wheeze  HEART: Regular rate and rhythm; No murmurs, rubs, or gallops  ABDOMEN: Soft, Nontender, Nondistended; Bowel sounds present  EXTREMITIES:  2+ Peripheral Pulses, No clubbing, cyanosis, or edema  PSYCH: AAOx3  NEUROLOGY: non-focal  SKIN: No rashes or lesions    LABS:    Comprehensive Metabolic Panel (05.18.22 @ 15:52)    Sodium, Serum: 141 mmol/L    Potassium, Serum: 4.2 mmol/L    Chloride, Serum: 102 mmol/L    Carbon Dioxide, Serum: 29 mmol/L    Anion Gap, Serum: 10 mmol/L    Blood Urea Nitrogen, Serum: 22 mg/dL    Creatinine, Serum: 0.74 mg/dL    Glucose, Serum: 89 mg/dL    Calcium, Total Serum: 9.4 mg/dL    Protein Total, Serum: 7.1 g/dL    Albumin, Serum: 4.1 g/dL    Bilirubin Total, Serum: 0.3 mg/dL    Alkaline Phosphatase, Serum: 73 U/L    Aspartate Aminotransferase (AST/SGOT): 23 U/L    Alanine Aminotransferase (ALT/SGPT): 19 U/L    eGFR: 85: The estimated glomerular filtration rate (eGFR) is calculated using the  2021 CKD-EPI creatinine equation, which does not have a coefficient for  race and is validated in individuals 18 years of age and older (N Engl J  Med 2021; 385:4168-4705). Creatinine-based eGFR may be inaccurate in  various situations including but not limited to extremes of muscle mass,  altered dietary protein intake, or medications that affect renal tubular  creatinine secretion. mL/min/1.73m2    Psychiatry Section:  Psychiatric Summary/[facility] admitting diagnosis:    Psychiatric Recommendations:    Observation status (check one):   ( ) Constant Observation  ( ) Enhanced care  ( ) Routine checks    Risk Status (check all that apply if present):  ( ) at risk for suicide/self-injury  ( ) at risk for aggressive behavior  ( ) at risk for elopement  ( ) other risk: [facility] Inpatient to ED Transfer Summary    Reason for Transfer/Medical Summary: 71 y/o Obese F PMHx Dementia (A+Ox1-2), MDD hx of SI, DM HTN, HLD, MS (wheelchair dependent, on Plegridy), hyperthyroidism (on methimazole), COPD not on 02.  Pt. had episode of loss of consciousness with possible seizure lasting approximately 10-15 seconds, eyes rolling back and tongue moving back and forth noted. Post ictal patient back to baseline, reports feeling weak. She had ECT today. Patient denies history of seizures. Denies chest pain, SOB, lightheadedness/ dizziness, or HA. VSS, . Will transfer to [facility] ED for further work up.  Multiple attempts made to reach patient's daughter, April @ [phone].     PAST MEDICAL & SURGICAL HISTORY:   Major depression    Diabetes    Hypertension    Multiple sclerosis    Hypothyroidism  No significant past surgical history    ALLERGIES:    No Known Allergies    Intolerances    MEDICATIONS  (STANDING):  atorvastatin 40 milliGRAM(s) Oral at bedtime  cholecalciferol 2000 Unit(s) Oral daily  enoxaparin Injectable 40 milliGRAM(s) SubCutaneous every 24 hours  FLUoxetine 40 milliGRAM(s) Oral daily  lisinopril 20 milliGRAM(s) Oral daily  methimazole 5 milliGRAM(s) Oral daily  QUEtiapine 12.5 milliGRAM(s) Oral at bedtime  rOPINIRole 2 milliGRAM(s) Oral at bedtime  trimethoprim  160 mG/sulfamethoxazole 800 mG 1 Tablet(s) Oral every 12 hours    MEDICATIONS  (PRN):  ALBUTerol    90 MICROgram(s) HFA Inhaler 2 Puff(s) Inhalation every 4 hours PRN Shortness of Breath and/or Wheezing  LORazepam     Tablet 0.5 milliGRAM(s) Oral every 6 hours PRN agitation  LORazepam   Injectable 0.5 milliGRAM(s) IntraMuscular once PRN agitation    CAPILLARY BLOOD GLUCOSE    POCT Blood Glucose.: 104 mg/dL (25 May 2022 21:08)    Vital Signs Last 24 Hrs  Temp: 98.2  HR: 67  BP: 142/66  BP(mean): --  RR: 16  SpO2: 98% on RA    PHYSICAL EXAM:  GENERAL: NAD, well-developed  HEAD:  Atraumatic, Normocephalic  EYES: EOMI, PERRLA, conjunctiva and sclera clear  NECK: Supple, No JVD  CHEST/LUNG: Clear to auscultation bilaterally; No wheeze  HEART: Regular rate and rhythm; No murmurs, rubs, or gallops  ABDOMEN: Soft, Nontender, Nondistended; Bowel sounds present  EXTREMITIES:  2+ Peripheral Pulses, No clubbing, cyanosis, or edema  PSYCH: AAOx3  NEUROLOGY: non-focal  SKIN: No rashes or lesions    LABS:    Comprehensive Metabolic Panel (05.18.22 @ 15:52)    Sodium, Serum: 141 mmol/L    Potassium, Serum: 4.2 mmol/L    Chloride, Serum: 102 mmol/L    Carbon Dioxide, Serum: 29 mmol/L    Anion Gap, Serum: 10 mmol/L    Blood Urea Nitrogen, Serum: 22 mg/dL    Creatinine, Serum: 0.74 mg/dL    Glucose, Serum: 89 mg/dL    Calcium, Total Serum: 9.4 mg/dL    Protein Total, Serum: 7.1 g/dL    Albumin, Serum: 4.1 g/dL    Bilirubin Total, Serum: 0.3 mg/dL    Alkaline Phosphatase, Serum: 73 U/L    Aspartate Aminotransferase (AST/SGOT): 23 U/L    Alanine Aminotransferase (ALT/SGPT): 19 U/L    eGFR: 85: The estimated glomerular filtration rate (eGFR) is calculated using the  2021 CKD-EPI creatinine equation, which does not have a coefficient for  race and is validated in individuals 18 years of age and older (N Engl J  Med 2021; 385:0584-3531). Creatinine-based eGFR may be inaccurate in  various situations including but not limited to extremes of muscle mass,  altered dietary protein intake, or medications that affect renal tubular  creatinine secretion. mL/min/1.73m2    Psychiatry Section:  Psychiatric Summary/[facility] admitting diagnosis:    Psychiatric Recommendations:    Observation status (check one):   ( ) Constant Observation  ( ) Enhanced care  ( ) Routine checks    Risk Status (check all that apply if present):  ( ) at risk for suicide/self-injury  ( ) at risk for aggressive behavior  ( ) at risk for elopement  ( ) other risk:

## 2022-05-25 NOTE — ED PROVIDER NOTE - PROGRESS NOTE DETAILS
GURPREET Staton. PGY-3: discussed case with hospitalist, accepted on medicine service with neuro following for epilepsy w/u

## 2022-05-25 NOTE — BH INPATIENT PSYCHIATRY PROGRESS NOTE - NSBHCHARTREVIEWVS_PSY_A_CORE FT
Vital Signs Last 24 Hrs  T(C): --  T(F): --  HR: --  BP: --  BP(mean): --  RR: --  SpO2: --    Orthostatic VS  05-20-22 @ 08:12  Lying BP: 120/71 HR: 50  Sitting BP: --/-- HR: --  Standing BP: --/-- HR: --  Site: --  Mode: --  Orthostatic VS  05-19-22 @ 16:58  Lying BP: --/-- HR: --  Sitting BP: 113/50 HR: 56  Standing BP: --/-- HR: --  Site: --  Mode: --  
Vital Signs Last 24 Hrs  T(C): 36.9 (05-25-22 @ 08:04), Max: 36.9 (05-25-22 @ 08:04)  T(F): 98.5 (05-25-22 @ 08:04), Max: 98.5 (05-25-22 @ 08:04)  HR: --  BP: --  BP(mean): --  RR: --  SpO2: --    Orthostatic VS  05-25-22 @ 08:41  Lying BP: --/-- HR: --  Sitting BP: 117/74 HR: 80  Standing BP: --/-- HR: --  Site: --  Mode: --  Orthostatic VS  05-25-22 @ 08:04  Lying BP: --/-- HR: --  Sitting BP: 94/55 HR: 64  Standing BP: --/-- HR: --  Site: --  Mode: --  Orthostatic VS  05-24-22 @ 08:01  Lying BP: --/-- HR: --  Sitting BP: 110/61 HR: 78  Standing BP: --/-- HR: --  Site: --  Mode: --  
Vital Signs Last 24 Hrs  T(C): 36.4 (05-20-22 @ 08:12), Max: 36.9 (05-19-22 @ 16:58)  T(F): 97.5 (05-20-22 @ 08:12), Max: 98.4 (05-19-22 @ 16:58)  HR: --  BP: --  BP(mean): --  RR: --  SpO2: 100% (05-19-22 @ 16:58) (100% - 100%)    Orthostatic VS  05-20-22 @ 08:12  Lying BP: 120/71 HR: 50  Sitting BP: --/-- HR: --  Standing BP: --/-- HR: --  Site: --  Mode: --  Orthostatic VS  05-19-22 @ 16:58  Lying BP: --/-- HR: --  Sitting BP: 113/50 HR: 56  Standing BP: --/-- HR: --  Site: --  Mode: --  
Vital Signs Last 24 Hrs  T(C): --  T(F): --  HR: --  BP: --  BP(mean): --  RR: --  SpO2: --    Orthostatic VS  05-20-22 @ 08:12  Lying BP: 120/71 HR: 50  Sitting BP: --/-- HR: --  Standing BP: --/-- HR: --  Site: --  Mode: --  
Vital Signs Last 24 Hrs  T(C): 36.4 (05-24-22 @ 08:01), Max: 36.4 (05-24-22 @ 08:01)  T(F): 97.5 (05-24-22 @ 08:01), Max: 97.5 (05-24-22 @ 08:01)  HR: --  BP: --  BP(mean): --  RR: --  SpO2: --    Orthostatic VS  05-24-22 @ 08:01  Lying BP: --/-- HR: --  Sitting BP: 110/61 HR: 78  Standing BP: --/-- HR: --  Site: --  Mode: --  Orthostatic VS  05-23-22 @ 07:51  Lying BP: --/-- HR: --  Sitting BP: 120/76 HR: 57  Standing BP: --/-- HR: --  Site: --  Mode: --  
Vital Signs Last 24 Hrs  T(C): 36.7 (05-23-22 @ 07:51), Max: 36.7 (05-23-22 @ 07:51)  T(F): 98 (05-23-22 @ 07:51), Max: 98 (05-23-22 @ 07:51)  HR: --  BP: --  BP(mean): --  RR: --  SpO2: --    Orthostatic VS  05-23-22 @ 07:51  Lying BP: --/-- HR: --  Sitting BP: 120/76 HR: 57  Standing BP: --/-- HR: --  Site: --  Mode: --  Orthostatic VS  05-22-22 @ 06:35  Lying BP: --/-- HR: --  Sitting BP: 103/60 HR: 67  Standing BP: --/-- HR: --  Site: upper left arm  Mode: electronic

## 2022-05-25 NOTE — BH INPATIENT PSYCHIATRY PROGRESS NOTE - CURRENT MEDICATION
MEDICATIONS  (STANDING):  atorvastatin 40 milliGRAM(s) Oral at bedtime  cholecalciferol 2000 Unit(s) Oral daily  enoxaparin Injectable 40 milliGRAM(s) SubCutaneous every 24 hours  FLUoxetine 40 milliGRAM(s) Oral daily  lisinopril 20 milliGRAM(s) Oral daily  methimazole 5 milliGRAM(s) Oral daily  QUEtiapine 12.5 milliGRAM(s) Oral at bedtime  rOPINIRole 2 milliGRAM(s) Oral at bedtime  trimethoprim  160 mG/sulfamethoxazole 800 mG 1 Tablet(s) Oral every 12 hours    MEDICATIONS  (PRN):  ALBUTerol    90 MICROgram(s) HFA Inhaler 2 Puff(s) Inhalation every 4 hours PRN Shortness of Breath and/or Wheezing  LORazepam     Tablet 0.5 milliGRAM(s) Oral every 6 hours PRN agitation  LORazepam   Injectable 0.5 milliGRAM(s) IntraMuscular once PRN agitation

## 2022-05-26 DIAGNOSIS — Z78.9 OTHER SPECIFIED HEALTH STATUS: Chronic | ICD-10-CM

## 2022-05-26 DIAGNOSIS — F32.9 MAJOR DEPRESSIVE DISORDER, SINGLE EPISODE, UNSPECIFIED: ICD-10-CM

## 2022-05-26 DIAGNOSIS — F01.50 VASCULAR DEMENTIA WITHOUT BEHAVIORAL DISTURBANCE: ICD-10-CM

## 2022-05-26 DIAGNOSIS — I10 ESSENTIAL (PRIMARY) HYPERTENSION: ICD-10-CM

## 2022-05-26 DIAGNOSIS — E11.9 TYPE 2 DIABETES MELLITUS WITHOUT COMPLICATIONS: ICD-10-CM

## 2022-05-26 DIAGNOSIS — N39.0 URINARY TRACT INFECTION, SITE NOT SPECIFIED: ICD-10-CM

## 2022-05-26 DIAGNOSIS — G35 MULTIPLE SCLEROSIS: ICD-10-CM

## 2022-05-26 DIAGNOSIS — E05.90 THYROTOXICOSIS, UNSPECIFIED WITHOUT THYROTOXIC CRISIS OR STORM: ICD-10-CM

## 2022-05-26 DIAGNOSIS — G93.40 ENCEPHALOPATHY, UNSPECIFIED: ICD-10-CM

## 2022-05-26 DIAGNOSIS — Z29.9 ENCOUNTER FOR PROPHYLACTIC MEASURES, UNSPECIFIED: ICD-10-CM

## 2022-05-26 DIAGNOSIS — R41.82 ALTERED MENTAL STATUS, UNSPECIFIED: ICD-10-CM

## 2022-05-26 DIAGNOSIS — R41.89 OTHER SYMPTOMS AND SIGNS INVOLVING COGNITIVE FUNCTIONS AND AWARENESS: ICD-10-CM

## 2022-05-26 PROBLEM — E03.9 HYPOTHYROIDISM, UNSPECIFIED: Chronic | Status: ACTIVE | Noted: 2022-05-18

## 2022-05-26 LAB
A1C WITH ESTIMATED AVERAGE GLUCOSE RESULT: 5.5 % — SIGNIFICANT CHANGE UP (ref 4–5.6)
ALBUMIN SERPL ELPH-MCNC: 3.9 G/DL — SIGNIFICANT CHANGE UP (ref 3.3–5)
ALP SERPL-CCNC: 57 U/L — SIGNIFICANT CHANGE UP (ref 40–120)
ALT FLD-CCNC: 28 U/L — SIGNIFICANT CHANGE UP (ref 4–33)
AMMONIA BLD-MCNC: 11 UMOL/L — SIGNIFICANT CHANGE UP (ref 11–55)
ANION GAP SERPL CALC-SCNC: 12 MMOL/L — SIGNIFICANT CHANGE UP (ref 7–14)
APPEARANCE UR: CLEAR — SIGNIFICANT CHANGE UP
APTT BLD: 30.9 SEC — SIGNIFICANT CHANGE UP (ref 27–36.3)
AST SERPL-CCNC: 29 U/L — SIGNIFICANT CHANGE UP (ref 4–32)
BASE EXCESS BLDV CALC-SCNC: 0.9 MMOL/L — SIGNIFICANT CHANGE UP (ref -2–3)
BASOPHILS # BLD AUTO: 0 K/UL — SIGNIFICANT CHANGE UP (ref 0–0.2)
BASOPHILS NFR BLD AUTO: 0 % — SIGNIFICANT CHANGE UP (ref 0–2)
BILIRUB SERPL-MCNC: 0.6 MG/DL — SIGNIFICANT CHANGE UP (ref 0.2–1.2)
BILIRUB UR-MCNC: NEGATIVE — SIGNIFICANT CHANGE UP
BLOOD GAS VENOUS COMPREHENSIVE RESULT: SIGNIFICANT CHANGE UP
BUN SERPL-MCNC: 24 MG/DL — HIGH (ref 7–23)
CALCIUM SERPL-MCNC: 9.4 MG/DL — SIGNIFICANT CHANGE UP (ref 8.4–10.5)
CHLORIDE BLDV-SCNC: 102 MMOL/L — SIGNIFICANT CHANGE UP (ref 96–108)
CHLORIDE SERPL-SCNC: 103 MMOL/L — SIGNIFICANT CHANGE UP (ref 98–107)
CHOLEST SERPL-MCNC: 167 MG/DL — SIGNIFICANT CHANGE UP
CK MB BLD-MCNC: 4.3 % — HIGH (ref 0–2.5)
CK MB CFR SERPL CALC: 2 NG/ML — SIGNIFICANT CHANGE UP
CK SERPL-CCNC: 46 U/L — SIGNIFICANT CHANGE UP (ref 25–170)
CO2 BLDV-SCNC: 28 MMOL/L — HIGH (ref 22–26)
CO2 SERPL-SCNC: 23 MMOL/L — SIGNIFICANT CHANGE UP (ref 22–31)
COLOR SPEC: YELLOW — SIGNIFICANT CHANGE UP
CREAT SERPL-MCNC: 0.94 MG/DL — SIGNIFICANT CHANGE UP (ref 0.5–1.3)
CRP SERPL-MCNC: <4 MG/L — SIGNIFICANT CHANGE UP
DIFF PNL FLD: NEGATIVE — SIGNIFICANT CHANGE UP
EGFR: 64 ML/MIN/1.73M2 — SIGNIFICANT CHANGE UP
EOSINOPHIL # BLD AUTO: 0.12 K/UL — SIGNIFICANT CHANGE UP (ref 0–0.5)
EOSINOPHIL NFR BLD AUTO: 1.7 % — SIGNIFICANT CHANGE UP (ref 0–6)
ERYTHROCYTE [SEDIMENTATION RATE] IN BLOOD: 28 MM/HR — HIGH (ref 4–25)
ESTIMATED AVERAGE GLUCOSE: 111 — SIGNIFICANT CHANGE UP
GAS PNL BLDV: 132 MMOL/L — LOW (ref 136–145)
GLUCOSE BLDC GLUCOMTR-MCNC: 90 MG/DL — SIGNIFICANT CHANGE UP (ref 70–99)
GLUCOSE BLDV-MCNC: 84 MG/DL — SIGNIFICANT CHANGE UP (ref 70–99)
GLUCOSE SERPL-MCNC: 93 MG/DL — SIGNIFICANT CHANGE UP (ref 70–99)
GLUCOSE UR QL: NEGATIVE — SIGNIFICANT CHANGE UP
HCO3 BLDV-SCNC: 27 MMOL/L — SIGNIFICANT CHANGE UP (ref 22–29)
HCT VFR BLD CALC: 37.4 % — SIGNIFICANT CHANGE UP (ref 34.5–45)
HCT VFR BLDA CALC: 37 % — SIGNIFICANT CHANGE UP (ref 34.5–46.5)
HDLC SERPL-MCNC: 74 MG/DL — SIGNIFICANT CHANGE UP
HGB BLD CALC-MCNC: 12.3 G/DL — SIGNIFICANT CHANGE UP (ref 11.5–15.5)
HGB BLD-MCNC: 11.9 G/DL — SIGNIFICANT CHANGE UP (ref 11.5–15.5)
IANC: 4.41 K/UL — SIGNIFICANT CHANGE UP (ref 1.8–7.4)
IMM GRANULOCYTES NFR BLD AUTO: 0.3 % — SIGNIFICANT CHANGE UP (ref 0–1.5)
INR BLD: 1.17 RATIO — HIGH (ref 0.88–1.16)
KETONES UR-MCNC: ABNORMAL
LACTATE BLDV-MCNC: 0.8 MMOL/L — SIGNIFICANT CHANGE UP (ref 0.5–2)
LEUKOCYTE ESTERASE UR-ACNC: NEGATIVE — SIGNIFICANT CHANGE UP
LIPID PNL WITH DIRECT LDL SERPL: 83 MG/DL — SIGNIFICANT CHANGE UP
LYMPHOCYTES # BLD AUTO: 2.2 K/UL — SIGNIFICANT CHANGE UP (ref 1–3.3)
LYMPHOCYTES # BLD AUTO: 30.4 % — SIGNIFICANT CHANGE UP (ref 13–44)
MAGNESIUM SERPL-MCNC: 2 MG/DL — SIGNIFICANT CHANGE UP (ref 1.6–2.6)
MCHC RBC-ENTMCNC: 29.3 PG — SIGNIFICANT CHANGE UP (ref 27–34)
MCHC RBC-ENTMCNC: 31.8 GM/DL — LOW (ref 32–36)
MCV RBC AUTO: 92.1 FL — SIGNIFICANT CHANGE UP (ref 80–100)
MONOCYTES # BLD AUTO: 0.48 K/UL — SIGNIFICANT CHANGE UP (ref 0–0.9)
MONOCYTES NFR BLD AUTO: 6.6 % — SIGNIFICANT CHANGE UP (ref 2–14)
NEUTROPHILS # BLD AUTO: 4.41 K/UL — SIGNIFICANT CHANGE UP (ref 1.8–7.4)
NEUTROPHILS NFR BLD AUTO: 61 % — SIGNIFICANT CHANGE UP (ref 43–77)
NITRITE UR-MCNC: NEGATIVE — SIGNIFICANT CHANGE UP
NON HDL CHOLESTEROL: 93 MG/DL — SIGNIFICANT CHANGE UP
NRBC # BLD: 0 /100 WBCS — SIGNIFICANT CHANGE UP
NRBC # FLD: 0 K/UL — SIGNIFICANT CHANGE UP
PCO2 BLDV: 46 MMHG — HIGH (ref 39–42)
PH BLDV: 7.37 — SIGNIFICANT CHANGE UP (ref 7.32–7.43)
PH UR: 6.5 — SIGNIFICANT CHANGE UP (ref 5–8)
PHOSPHATE SERPL-MCNC: 3.6 MG/DL — SIGNIFICANT CHANGE UP (ref 2.5–4.5)
PLATELET # BLD AUTO: 223 K/UL — SIGNIFICANT CHANGE UP (ref 150–400)
PO2 BLDV: 35 MMHG — SIGNIFICANT CHANGE UP
POTASSIUM BLDV-SCNC: 4 MMOL/L — SIGNIFICANT CHANGE UP (ref 3.5–5.1)
POTASSIUM SERPL-MCNC: 4.4 MMOL/L — SIGNIFICANT CHANGE UP (ref 3.5–5.3)
POTASSIUM SERPL-SCNC: 4.4 MMOL/L — SIGNIFICANT CHANGE UP (ref 3.5–5.3)
PROT SERPL-MCNC: 6.5 G/DL — SIGNIFICANT CHANGE UP (ref 6–8.3)
PROT UR-MCNC: ABNORMAL
PROTHROM AB SERPL-ACNC: 13.6 SEC — HIGH (ref 10.5–13.4)
RBC # BLD: 4.06 M/UL — SIGNIFICANT CHANGE UP (ref 3.8–5.2)
RBC # FLD: 14.1 % — SIGNIFICANT CHANGE UP (ref 10.3–14.5)
SAO2 % BLDV: 56.1 % — SIGNIFICANT CHANGE UP
SARS-COV-2 RNA SPEC QL NAA+PROBE: SIGNIFICANT CHANGE UP
SODIUM SERPL-SCNC: 138 MMOL/L — SIGNIFICANT CHANGE UP (ref 135–145)
SP GR SPEC: 1.03 — SIGNIFICANT CHANGE UP (ref 1–1.05)
TRIGL SERPL-MCNC: 48 MG/DL — SIGNIFICANT CHANGE UP
TROPONIN T, HIGH SENSITIVITY RESULT: 10 NG/L — SIGNIFICANT CHANGE UP
UROBILINOGEN FLD QL: ABNORMAL
WBC # BLD: 7.23 K/UL — SIGNIFICANT CHANGE UP (ref 3.8–10.5)
WBC # FLD AUTO: 7.23 K/UL — SIGNIFICANT CHANGE UP (ref 3.8–10.5)

## 2022-05-26 PROCEDURE — 99223 1ST HOSP IP/OBS HIGH 75: CPT

## 2022-05-26 PROCEDURE — 71045 X-RAY EXAM CHEST 1 VIEW: CPT | Mod: 26

## 2022-05-26 PROCEDURE — 95720 EEG PHY/QHP EA INCR W/VEEG: CPT

## 2022-05-26 PROCEDURE — 99223 1ST HOSP IP/OBS HIGH 75: CPT | Mod: GC

## 2022-05-26 RX ORDER — SODIUM CHLORIDE 9 MG/ML
1000 INJECTION, SOLUTION INTRAVENOUS
Refills: 0 | Status: DISCONTINUED | OUTPATIENT
Start: 2022-05-26 | End: 2022-05-30

## 2022-05-26 RX ORDER — DEXTROSE 50 % IN WATER 50 %
25 SYRINGE (ML) INTRAVENOUS ONCE
Refills: 0 | Status: DISCONTINUED | OUTPATIENT
Start: 2022-05-26 | End: 2022-05-26

## 2022-05-26 RX ORDER — ATORVASTATIN CALCIUM 80 MG/1
80 TABLET, FILM COATED ORAL AT BEDTIME
Refills: 0 | Status: DISCONTINUED | OUTPATIENT
Start: 2022-05-26 | End: 2022-06-18

## 2022-05-26 RX ORDER — FLUOXETINE HCL 10 MG
1 CAPSULE ORAL
Qty: 0 | Refills: 0 | DISCHARGE

## 2022-05-26 RX ORDER — LISINOPRIL 2.5 MG/1
20 TABLET ORAL DAILY
Refills: 0 | Status: DISCONTINUED | OUTPATIENT
Start: 2022-05-26 | End: 2022-06-18

## 2022-05-26 RX ORDER — TRAZODONE HCL 50 MG
1 TABLET ORAL
Qty: 0 | Refills: 0 | DISCHARGE

## 2022-05-26 RX ORDER — QUETIAPINE FUMARATE 200 MG/1
0.5 TABLET, FILM COATED ORAL
Qty: 0 | Refills: 0 | DISCHARGE

## 2022-05-26 RX ORDER — ROPINIROLE 8 MG/1
2 TABLET, FILM COATED, EXTENDED RELEASE ORAL AT BEDTIME
Refills: 0 | Status: DISCONTINUED | OUTPATIENT
Start: 2022-05-26 | End: 2022-06-18

## 2022-05-26 RX ORDER — METHIMAZOLE 10 MG/1
1 TABLET ORAL
Qty: 0 | Refills: 72 | DISCHARGE

## 2022-05-26 RX ORDER — DEXTROSE 50 % IN WATER 50 %
15 SYRINGE (ML) INTRAVENOUS ONCE
Refills: 0 | Status: DISCONTINUED | OUTPATIENT
Start: 2022-05-26 | End: 2022-05-26

## 2022-05-26 RX ORDER — ENOXAPARIN SODIUM 100 MG/ML
40 INJECTION SUBCUTANEOUS EVERY 24 HOURS
Refills: 0 | Status: DISCONTINUED | OUTPATIENT
Start: 2022-05-26 | End: 2022-06-18

## 2022-05-26 RX ORDER — PEGINTERFERON BETA-1A 125 UG/.5ML
1 INJECTION, SOLUTION SUBCUTANEOUS
Qty: 0 | Refills: 0 | DISCHARGE

## 2022-05-26 RX ORDER — ALBUTEROL 90 UG/1
2 AEROSOL, METERED ORAL
Qty: 0 | Refills: 0 | DISCHARGE

## 2022-05-26 RX ORDER — LISINOPRIL 2.5 MG/1
1 TABLET ORAL
Qty: 0 | Refills: 0 | DISCHARGE

## 2022-05-26 RX ORDER — DEXTROSE 50 % IN WATER 50 %
12.5 SYRINGE (ML) INTRAVENOUS ONCE
Refills: 0 | Status: DISCONTINUED | OUTPATIENT
Start: 2022-05-26 | End: 2022-05-26

## 2022-05-26 RX ORDER — HYDROCHLOROTHIAZIDE 25 MG
25 TABLET ORAL DAILY
Refills: 0 | Status: DISCONTINUED | OUTPATIENT
Start: 2022-05-27 | End: 2022-06-18

## 2022-05-26 RX ORDER — ACETAMINOPHEN 500 MG
650 TABLET ORAL EVERY 6 HOURS
Refills: 0 | Status: DISCONTINUED | OUTPATIENT
Start: 2022-05-26 | End: 2022-06-18

## 2022-05-26 RX ORDER — GLUCAGON INJECTION, SOLUTION 0.5 MG/.1ML
1 INJECTION, SOLUTION SUBCUTANEOUS ONCE
Refills: 0 | Status: DISCONTINUED | OUTPATIENT
Start: 2022-05-26 | End: 2022-05-26

## 2022-05-26 RX ORDER — SODIUM CHLORIDE 9 MG/ML
1000 INJECTION, SOLUTION INTRAVENOUS
Refills: 0 | Status: DISCONTINUED | OUTPATIENT
Start: 2022-05-26 | End: 2022-05-26

## 2022-05-26 RX ORDER — INSULIN LISPRO 100/ML
VIAL (ML) SUBCUTANEOUS
Refills: 0 | Status: DISCONTINUED | OUTPATIENT
Start: 2022-05-26 | End: 2022-05-26

## 2022-05-26 RX ORDER — FLUOXETINE HCL 10 MG
40 CAPSULE ORAL DAILY
Refills: 0 | Status: DISCONTINUED | OUTPATIENT
Start: 2022-05-26 | End: 2022-06-18

## 2022-05-26 RX ORDER — QUETIAPINE FUMARATE 200 MG/1
12.5 TABLET, FILM COATED ORAL AT BEDTIME
Refills: 0 | Status: DISCONTINUED | OUTPATIENT
Start: 2022-05-26 | End: 2022-06-18

## 2022-05-26 RX ORDER — CHOLECALCIFEROL (VITAMIN D3) 125 MCG
2000 CAPSULE ORAL DAILY
Refills: 0 | Status: DISCONTINUED | OUTPATIENT
Start: 2022-05-26 | End: 2022-05-26

## 2022-05-26 RX ORDER — HYDROCHLOROTHIAZIDE 25 MG
25 TABLET ORAL DAILY
Refills: 0 | Status: DISCONTINUED | OUTPATIENT
Start: 2022-05-26 | End: 2022-05-26

## 2022-05-26 RX ORDER — CLONAZEPAM 1 MG
1 TABLET ORAL
Qty: 0 | Refills: 0 | DISCHARGE

## 2022-05-26 RX ORDER — ROPINIROLE 8 MG/1
1 TABLET, FILM COATED, EXTENDED RELEASE ORAL
Qty: 0 | Refills: 0 | DISCHARGE

## 2022-05-26 RX ORDER — CHOLECALCIFEROL (VITAMIN D3) 125 MCG
2000 CAPSULE ORAL DAILY
Refills: 0 | Status: DISCONTINUED | OUTPATIENT
Start: 2022-05-26 | End: 2022-06-18

## 2022-05-26 RX ADMIN — ROPINIROLE 2 MILLIGRAM(S): 8 TABLET, FILM COATED, EXTENDED RELEASE ORAL at 21:29

## 2022-05-26 RX ADMIN — Medication 2000 UNIT(S): at 12:47

## 2022-05-26 RX ADMIN — Medication 40 MILLIGRAM(S): at 12:46

## 2022-05-26 RX ADMIN — SODIUM CHLORIDE 100 MILLILITER(S): 9 INJECTION, SOLUTION INTRAVENOUS at 06:53

## 2022-05-26 RX ADMIN — QUETIAPINE FUMARATE 12.5 MILLIGRAM(S): 200 TABLET, FILM COATED ORAL at 21:29

## 2022-05-26 RX ADMIN — ATORVASTATIN CALCIUM 80 MILLIGRAM(S): 80 TABLET, FILM COATED ORAL at 21:29

## 2022-05-26 RX ADMIN — ENOXAPARIN SODIUM 40 MILLIGRAM(S): 100 INJECTION SUBCUTANEOUS at 06:34

## 2022-05-26 NOTE — BH INPATIENT PSYCHIATRY DISCHARGE NOTE - NSDCPROCEDURESFT_PSY_ALL_CORE
05-26    138  |  103  |  24<H>  ----------------------------<  93  4.4   |  23  |  0.94    Ca    9.4      26 May 2022 01:37  Phos  3.6     05-26  Mg     2.00     05-26    TPro  6.5  /  Alb  3.9  /  TBili  0.6  /  DBili  x   /  AST  29  /  ALT  28  /  AlkPhos  57  05-26      CT HEAD:  There is no acute intracranial hemorrhage or mass effect. Gray-white   differentiation is preserved.  Extensive periventricular and subcortical white matter hypoattenuation,    compatible with severe chronic microvascular ischemic changes.  No extra-axial collection.  Ventricles and sulci are normal in size for the patient's age without   hydrocephalus. Basal cisterns are patent.  No fluid levels within the paranasal sinuses. Calvarium is intact.    IMPRESSION:  No acute intracranial hemorrhage or acute territorial infarction. Severe   chronic microvascular ischemic changes.

## 2022-05-26 NOTE — OCCUPATIONAL THERAPY INITIAL EVALUATION ADULT - MD ORDER
Occupational Therapy (OT) to evaluate and treat. Occupational Therapy (OT) to evaluate and treat. Per OWEN Humphrey, pt is with EEG but okay to participate in OT evaluation and perform activity as tolerated. Confirmed with team ([phone]. [phone])- pt is okay for activity as tolerated with supervision.

## 2022-05-26 NOTE — H&P ADULT - PROBLEM SELECTOR PLAN 5
- continue methimazole + UTI dx on 05/17  - pt was Rx PO Bactrim on 05/17   - repeat UA and Urine cx ordered   - stop PO abx + UTI dx on 05/17  - pt was Rx PO Bactrim on 05/17   - repeat UA and Urine cx ordered   - stop PO abx  - ensure optimal hydration

## 2022-05-26 NOTE — BH INPATIENT PSYCHIATRY DISCHARGE NOTE - NSDCCPCAREPLAN_GEN_ALL_CORE_FT
PRINCIPAL DISCHARGE DIAGNOSIS  Diagnosis: MDD (major depressive disorder), single episode, severe with psychotic features  Assessment and Plan of Treatment: You were seen in the hospital because of worsening depression with suicidal thoughts. Sometimes people who are depressed will also start experiencing a disconnect with reality, which can include suspiciousness and paranoid thinking.      SECONDARY DISCHARGE DIAGNOSES  Diagnosis: Obesity  Assessment and Plan of Treatment:     Diagnosis: COPD, mild  Assessment and Plan of Treatment:     Diagnosis: Multiple sclerosis  Assessment and Plan of Treatment:     Diagnosis: Hyperlipidemia  Assessment and Plan of Treatment:     Diagnosis: Vascular dementia  Assessment and Plan of Treatment:     Diagnosis: Hyperthyroidism  Assessment and Plan of Treatment:

## 2022-05-26 NOTE — H&P ADULT - PROBLEM SELECTOR PROBLEM 2
----- Message from Cheyanne Gray sent at 11/6/2019 11:02 AM CST -----  Contact: Pt  Pt is requesting a callback at [phone] to get his 4 month visit scheduled    Major depression

## 2022-05-26 NOTE — OCCUPATIONAL THERAPY INITIAL EVALUATION ADULT - GENERAL OBSERVATIONS, REHAB EVAL
Pt. received semisupine in bed, +EEG. Patient agreed to evaluation from Occupational Therapist. +IV. 1:1 bedside.

## 2022-05-26 NOTE — PATIENT PROFILE ADULT - FALL HARM RISK - HARM RISK INTERVENTIONS

## 2022-05-26 NOTE — PATIENT PROFILE ADULT - INFORMATION COULD NOT BE OBTAINED DETAILS
Chance Zee  NEUROSURGERY  [address] [place] [address]  Phone: [phone]  Fax: [phone]  Follow Up Time:    unable to obtain information due to cognitive status

## 2022-05-26 NOTE — BH DISCHARGE NOTE NURSING/SOCIAL WORK/PSYCH REHAB - NSDCPRGOAL_PSY_ALL_CORE
Due to a medical complications patient was transferred to [facility]. Patient initially presented with symptoms of depression, anxiety, decreased daily functioning and passive suicidal ideations. Patient's initial goals included increasing patient's hope in recovery, increasing her coping skills, increasing daily functioning and decreasing anxiety. Patient started making some gains towards her rehab goals as evidenced by patient's brief participation in 1:1's sessions, and increased daily functioning.  However, patient remained dysphoric, generally withdrawn, and spent most of her day sleeping.    ***Patient was unable to complete a self-rating or a Press Ganey survey.

## 2022-05-26 NOTE — BH INPATIENT PSYCHIATRY DISCHARGE NOTE - HOSPITAL COURSE
During hospitalization, the patient was being evaluated and treated for depressive symptoms including anhedonia, hypersomnia, hopelessness, suicidal thinking, decreased energy, poor concentration. Pt also with some paranoid thinking towards rehab facility staff with fears that they were saying bad things about her (corroborated by daughter's collateral). During pt's hospital course, she was continued on her home Prozac 40 mg daily and Seroquel 12.5 mg at bedtime for mood symptoms. Klonopin 0.5 mg (a home medication) and Trazodone  were held due to increased sedation. On initial assessment, pt unresponsive to verbal or sternal rub, though able to open her eyes when manually opened. There appeared to be a volitional component to some of these behaviors. Per daughter (April, HCP), the pt has had multiple of these episodes of unresponsiveness in the past which required extensive medical workup  without any significant findings (including MRIs of the brain). Of note, pt has been recently treated for a UTI which may lead to changes in her mental status. April noted that the pt was diagnosed with catatonia at one point though her sx self-resolved prior to any Ativan trials. No signs of catatonia observed during this admission other than some negativism and odd mannerisms (abnormal tongue movements). Pt more engaged during subsequent days in the hospital, following more commands, intermittent periods of hopefulness. She does have some baseline memory deficits 2/2 underlying dementia (AAOx2 at baseline). She continued to express suicidality with plans of stabbing self. Pt with sadness about loss of independence and difficulties with ambulating.    The decision was made to pursue ECT as treatment intervention as an option due to multiple failed medication trials. Pt assented to the procedure, daughter verbally consented. Discussed case with neurology who noted no contraindications 2/2 to hx of MS. Pt required further evaluation by hospitalist for hx of hyperthyroidism as well as documented MOCA. However, pt had an episode of AMS on 5/25 overnight where eyes rolled back and decreased responsiveness. Pt also with repetitive tongue movements (also seen on initial assessment). Pt sent to the ED for seizure concerns and was subsequently admitted to [facility].     There were no behavioral problems on the unit.  Patient did not become agitated, did not require emergency intramuscular medications or seclusion/restraints, and did not self-harm on the unit. Patient remained actively engaged in treatment and participated in individual, group, and milieu therapy.      DAY OF DISCHARGE  Pt appears stated age, fair hygiene and grooming, lethargic, obese. Pt with eyes open during interview, more alert. Pt makes poor eye contact, cooperative, calm. Speech is normal rate, somewhat more spontaneous, normal rhythm, repetitive. Tremulous tongue movements back and forth. Mood is "bad." Affect is mood congruent, mainly constricted to negative emotion though able to smile appropriately at times. TP is linear. TC without overt delusions today. +SI, denies HI, AVH. Insight and judgment are limited. AAOx2. Impulse control is fair.          - Suicidal and aggression risk assessments  The patient is at elevated chronic risk of self-harm due to hx of recent suicide attempt (cutting self with butter knife), elderly, hx of depressive disorder, chronic medical condition (MS). Modifiable risk factors included current suicidal thinking, depressed mood, increased life stressors. Immediate risk was minimized by inpatient admission to a safe environment with appropriate supervision and limited access to lethal means. Protective factors for suicide and aggression include supportive family, residential stability, sobriety. At this time, pt requires continued inpatient admission though required transfer to [facility] for further medical management for AMS.        Patient will be discharged with the following DSM5 Diagnoses: MDD with psychotic features, cluster B traits     Patient will be discharged on the following medications:   MEDICATIONS  (STANDING):  atorvastatin 40 mg Oral at bedtime  cholecalciferol 2000 Unit(s) Oral daily  enoxaparin Injectable 40  mg  SubCutaneous every 24 hours  Fluoxetine 40  mg  Oral daily  lisinopril 20  mg Oral daily  methimazole 5  mg Oral daily  Quetiapine 12.5  mg  Oral at bedtime  Ropinirole 2  mg Oral at bedtime  trimethoprim  160 mG/sulfamethoxazole 800 mG 1 Tablet(s) Oral every 12 hours    MEDICATIONS  (PRN):  Albuterol    90 MICROgram(s) HFA Inhaler 2 Puff(s) Inhalation every 4 hours PRN Shortness of Breath and/or Wheezing  Lorazepam     Tablet 0.5  mg  Oral every 6 hours PRN agitation  Lorazepam   Injectable 0.5  mg  IntraMuscular once PRN agitation      Email handoff was sent to [facility] including discussion of hospital course, treatment, and medications.     During hospitalization, the patient was being evaluated and treated for depressive symptoms including anhedonia, hypersomnia, hopelessness, suicidal thinking, decreased energy, poor concentration. Pt also with some paranoid thinking towards rehab facility staff with fears that they were saying bad things about her (corroborated by daughter's collateral). During pt's hospital course, she was continued on her home Prozac 40 mg daily and Seroquel 12.5 mg at bedtime for mood symptoms. Klonopin 0.5 mg (a home medication) and Trazodone  were held due to increased sedation. On initial assessment, pt unresponsive to verbal or sternal rub, though able to open her eyes when manually opened. There appeared to be a volitional component to some of these behaviors. Per daughter (April, HCP), the pt has had multiple of these episodes of unresponsiveness in the past which required extensive medical workup  without any significant findings (including MRIs of the brain). Of note, pt has been recently treated for a UTI which may lead to changes in her mental status. April noted that the pt was diagnosed with catatonia at one point though her sx self-resolved prior to any Ativan trials. No signs of catatonia observed during this admission other than some negativism and odd mannerisms (abnormal tongue movements). Pt more engaged during subsequent days in the hospital, following more commands, intermittent periods of hopefulness. She does have some baseline memory deficits 2/2 underlying dementia (AAOx2 at baseline). She continued to express suicidality with plans of stabbing self. Pt with sadness about loss of independence and difficulties with ambulating.    The decision was made to plan for ECT as a treatment intervention  due to multiple failed previous medication trials. Pt assented to the procedure, daughter verbally consented. Discussed case with neurology who noted no contraindications 2/2 to hx of MS. Pt required further evaluation by hospitalist for hx of hyperthyroidism as well as documented MOCA. However, pt had an episode of AMS on 5/25 overnight where eyes rolled back and decreased responsiveness. Pt also with repetitive tongue movements (also seen on initial assessment). Pt sent to the ED for seizure concerns and was subsequently admitted to [facility].     There were no behavioral problems on the unit.  Patient did not become agitated, did not require emergency intramuscular medications or seclusion/restraints, and did not self-harm on the unit. Patient remained actively engaged in treatment and participated in individual, group, and milieu therapy.      DAY OF DISCHARGE  Pt appears stated age, fair hygiene and grooming, lethargic, obese. Pt with eyes open during interview, more alert. Pt makes poor eye contact, cooperative, calm. Speech is normal rate, somewhat more spontaneous, normal rhythm, repetitive. Tremulous tongue movements back and forth. Mood is "bad." Affect is mood congruent, mainly constricted to negative emotion though able to smile appropriately at times. TP is linear. TC without overt delusions today. +SI, denies HI, AVH. Insight and judgment are limited. AAOx2. Impulse control is fair.          - Suicidal and aggression risk assessments  The patient is at elevated chronic risk of self-harm due to hx of recent suicide attempt (cutting self with butter knife), elderly, hx of depressive disorder, chronic medical condition (MS). Modifiable risk factors included current suicidal thinking, depressed mood, increased life stressors. Immediate risk was minimized by inpatient admission to a safe environment with appropriate supervision and limited access to lethal means. Protective factors for suicide and aggression include supportive family, residential stability, sobriety. At this time, pt requires continued inpatient admission though required transfer to [facility] for further medical management for AMS.        Patient will be discharged with the following DSM5 Diagnoses: MDD with psychotic features, cluster B traits     Patient will be discharged on the following medications:   MEDICATIONS  (STANDING):  atorvastatin 40 mg Oral at bedtime  cholecalciferol 2000 Unit(s) Oral daily  enoxaparin Injectable 40  mg  SubCutaneous every 24 hours  Fluoxetine 40  mg  Oral daily  lisinopril 20  mg Oral daily  methimazole 5  mg Oral daily  Quetiapine 12.5  mg  Oral at bedtime  Ropinirole 2  mg Oral at bedtime  trimethoprim  160 mG/sulfamethoxazole 800 mG 1 Tablet(s) Oral every 12 hours    MEDICATIONS  (PRN):  Albuterol    90 MICROgram(s) HFA Inhaler 2 Puff(s) Inhalation every 4 hours PRN Shortness of Breath and/or Wheezing  Lorazepam     Tablet 0.5  mg  Oral every 6 hours PRN agitation  Lorazepam   Injectable 0.5  mg  IntraMuscular once PRN agitation      Email handoff was sent to [facility] including discussion of hospital course, treatment, and medications.     During hospitalization, the patient was being evaluated and treated for depressive symptoms including anhedonia, hypersomnia, hopelessness, suicidal thinking, decreased energy, poor concentration. Pt also with some paranoid thinking towards rehab facility staff with fears that they were saying bad things about her (corroborated by daughter's collateral). During pt's hospital course, she was continued on her home Prozac 40 mg daily and Seroquel 12.5 mg at bedtime for mood symptoms. Klonopin 0.5 mg (a home medication) and Trazodone  were held due to increased sedation. On initial assessment, pt unresponsive to verbal or sternal rub, though able to open her eyes when manually opened. There appeared to be a volitional component to some of these behaviors. Per daughter (April, HCP), the pt has had multiple of these episodes of unresponsiveness in the past which required extensive medical workup  without any significant findings (including MRIs of the brain). Of note, pt has been recently treated for a UTI which may lead to changes in her mental status. April noted that the pt was diagnosed with catatonia at one point though her sx self-resolved prior to any Ativan trials. No signs of catatonia observed during this admission other than some negativism and odd mannerisms (abnormal tongue movements). Pt more engaged during subsequent days in the hospital, following more commands, intermittent periods of hopefulness. She does have some baseline memory deficits 2/2 underlying dementia (AAOx2 at baseline). She continued to express suicidality with plans of stabbing self. Pt with sadness about loss of independence and difficulties with ambulating.    The decision was made to plan for ECT as a treatment intervention due to multiple failed previous medication trials. Pt assented to the procedure, daughter verbally consented. Discussed case with neurology who noted no contraindications 2/2 to hx of MS. Pt required further evaluation by hospitalist for hx of hyperthyroidism as well as documented MOCA. However, pt had an episode of AMS on 5/25 overnight where eyes rolled back and decreased responsiveness. Pt also with repetitive tongue movements (also seen on initial assessment). Pt sent to the ED for seizure concerns and was subsequently admitted to [facility].     There were no behavioral problems on the unit.  Patient did not become agitated, did not require emergency intramuscular medications or seclusion/restraints, and did not self-harm on the unit. Patient remained actively engaged in treatment and participated in individual, group, and milieu therapy.      DAY OF DISCHARGE  Pt appears stated age, fair hygiene and grooming, lethargic, obese. Pt with eyes open during interview, more alert. Pt makes poor eye contact, cooperative, calm. Speech is normal rate, somewhat more spontaneous, normal rhythm, repetitive. Tremulous tongue movements back and forth. Mood is "bad." Affect is mood congruent, mainly constricted to negative emotion though able to smile appropriately at times. TP is linear. TC without overt delusions today. +SI, denies HI, AVH. Insight and judgment are limited. AAOx2. Impulse control is fair.          - Suicidal and aggression risk assessments  The patient is at elevated chronic risk of self-harm due to hx of recent suicide attempt (cutting self with butter knife), elderly, hx of depressive disorder, chronic medical condition (MS). Modifiable risk factors included current suicidal thinking, depressed mood, increased life stressors. Immediate risk was minimized by inpatient admission to a safe environment with appropriate supervision and limited access to lethal means. Protective factors for suicide and aggression include supportive family, residential stability, sobriety. At this time, pt requires continued inpatient admission though required transfer to [facility] for further medical management for AMS.        Patient will be discharged with the following DSM5 Diagnoses: MDD with psychotic features, cluster B traits     Patient will be discharged on the following medications:   MEDICATIONS  (STANDING):  atorvastatin 40 mg Oral at bedtime  cholecalciferol 2000 Unit(s) Oral daily  enoxaparin Injectable 40  mg  SubCutaneous every 24 hours  Fluoxetine 40  mg  Oral daily  lisinopril 20  mg Oral daily  methimazole 5  mg Oral daily  Quetiapine 12.5  mg  Oral at bedtime  Ropinirole 2  mg Oral at bedtime  trimethoprim  160 mG/sulfamethoxazole 800 mG 1 Tablet(s) Oral every 12 hours    MEDICATIONS  (PRN):  Albuterol    90 MICROgram(s) HFA Inhaler 2 Puff(s) Inhalation every 4 hours PRN Shortness of Breath and/or Wheezing  Lorazepam     Tablet 0.5  mg  Oral every 6 hours PRN agitation  Lorazepam   Injectable 0.5  mg  IntraMuscular once PRN agitation      Email handoff was sent to [facility] including discussion of hospital course, treatment, and medications.

## 2022-05-26 NOTE — BH CONSULTATION LIAISON ASSESSMENT NOTE - NSICDXPASTMEDICALHX_GEN_ALL_CORE_FT
PAST MEDICAL HISTORY:  Gait difficulty ~ wheelchair    History of COPD ~ not on home oxygen    Hypertension     Hypothyroidism     Major depression     Multiple sclerosis     Suicidal ideation     Type 2 diabetes mellitus     Vascular dementia     Vitamin D deficiency

## 2022-05-26 NOTE — H&P ADULT - HISTORY OF PRESENT ILLNESS
***************************************************************  Jacey Cyndy, PGY2  Internal Medicine   pager: NS: [phone] [facility]: [id number]  ***************************************************************    DIXON WILBURN  72y  Female      Patient is a 72y old  Female who presents with a chief complaint of     HPI:    ED Course:   VS: 97.8F, HR 66, /74, RR 16, 94% on RA  Labs: Hgb 12.5, WBC 8.2, Trop T 10  CT head: no acute intracranial bleeding ***************************************************************  Jacey Naylor, PGY2  Internal Medicine   pager: NS: [phone] [facility]: [id number]  ***************************************************************    DIXON WILBURN  72y  Female      Patient is a 72y old  Female who presents with a chief complaint of unresponsiveness    HPI:  71 y/o Obese F PMHx Dementia (A+Ox1-2), MDD (hx of SI), T2DM, HTN, HLD, MS, COPD (not on home O2)  presents from [facility] for unresponsiveness after receiving her first session of ECT today. History obtained from EMS. Patient LKN 7:30PM today when last visited by . Was noted to be groggy at 8:45 PM. Then around 9:05 was noted to have a 15-20 second period of unresponsiveness and tongue moving side to side with possible lateral gaze deviation for which there was concern of seizure. Patient was not responding. On initial ED evaluation patient was also not responding briefly nor following commands. Later would speak few words and appeared encephalopathic with decreased level of consciousness. On re-evaluation, was slightly more awake but still responding in few words. Unclear if now baseline.     She initially presented to [facility] on 5/19/22 from [facility] for depression and SI. As per ED notes patient stated she doesn't like the nursing home she lives at and doesn't like to be told what to do or what to eat. States if she didn't live there, and live outside normally she would use a gun to kill herself. Pt denies HI. Sent to ED for pysch evaluation. As per facility notes on 5/12/22 patient was observed using a butter knife to cut her wrists. She was sent to [facility] ER and returned back to [facility] on 5/13/22. At bedside patient denies any plans to hurt herself at the time and stated she was frustrated. Currently patient denies any SI,HI,AH,VHAs per notes patient has repeatedly made suicidal statements, showing depressed mood, loss of interest/please, low self esteem. Patient was Rx PO Bactrim on 05/17 for UTI - received two doses. Patient is incontinent, denies any urinary complaints. Uses wheelchair.    ED Course:   VS: 97.8F, HR 66, /74, RR 16, 94% on RA  Labs: Hgb 12.5, WBC 8.2, Trop T 10  CT head: no acute intracranial bleeding ***************************************************************  Jacey Naylor, PGY2  Internal Medicine   pager: NS: [phone] [facility]: [id number]  ***************************************************************    DIXON WILBURN  72y  Female      Patient is a 72y old  Female who presents with a chief complaint of unresponsiveness    HPI:  71 y/o Obese F PMHx Dementia (A+Ox1-2), MDD (hx of SI), T2DM, HTN, HLD, MS, COPD (not on home O2)  presents from [facility] for unresponsiveness after receiving her first session of ECT today. History obtained from EMS. Patient LKN 7:30PM today when last visited by . Was noted to be groggy at 8:45 PM. Then around 9:05 was noted to have a 15-20 second period of unresponsiveness and tongue moving side to side with possible lateral gaze deviation for which there was concern of seizure. Patient was not responding. On initial ED evaluation patient was also not responding briefly nor following commands. On re-evaluation, was slightly more awake but still responding in few words. Unclear if now baseline.     She initially presented to [facility] on 5/19/22 from [facility] for depression and SI. As per ED notes patient stated she doesn't like the nursing home she lives at and doesn't like to be told what to do or what to eat. States if she didn't live there, and live outside normally she would use a gun to kill herself. Pt denies HI. Sent to ED for pysch evaluation. As per facility notes on 5/12/22 patient was observed using a butter knife to cut her wrists. She was sent to [facility] ER and returned back to [facility] on 5/13/22. At bedside patient denies any plans to hurt herself at the time and stated she was frustrated. Currently patient denies any SI,HI,AH,VHAs per notes patient has repeatedly made suicidal statements, showing depressed mood, loss of interest/please, low self esteem. Patient was Rx PO Bactrim on 05/17 for UTI - received two doses. Patient is incontinent, denies any urinary complaints. Uses wheelchair.    ED Course:   VS: 97.8F, HR 66, /74, RR 16, 94% on RA  Labs: Hgb 12.5, WBC 8.2, Trop T 10, U/A negative  CT head: no acute intracranial bleeding ***************************************************************  Jacey Naylor, PGY2  Internal Medicine   pager: NS: [phone] [facility]: [id number]  ***************************************************************    DIXON WILBURN  72y  Female      Patient is a 72y old  Female who presents with a chief complaint of unresponsiveness    HPI:  73 y/o Obese F PMHx Dementia (A+Ox1-2), MDD (hx of SI), T2DM, HTN, HLD, MS, COPD (not on home O2)  presents from [facility] for unresponsiveness after receiving her first session of ECT today. History obtained from EMS. Patient LKN 7:30PM today when last visited by . Was noted to be groggy at 8:45 PM. Then around 9:05 was noted to have a 15-20 second period of unresponsiveness and tongue moving side to side with possible lateral gaze deviation for which there was concern of seizure. Patient was not responding. On initial ED evaluation patient was also not responding briefly nor following commands. On re-evaluation, was slightly more awake but still responding in few words. Unclear if now baseline.     She initially presented to [facility] on 5/19/22 from [facility] for depression and SI. As per ED notes patient stated she doesn't like the nursing home she lives at and doesn't like to be told what to do or what to eat. States if she didn't live there, and live outside normally she would use a gun to kill herself. Pt denies HI. Sent to ED for pysch evaluation. As per facility notes on 5/12/22 patient was observed using a butter knife to cut her wrists. She was sent to [facility] ER and returned back to [facility] on 5/13/22. At bedside patient denies any plans to hurt herself at the time and stated she was frustrated. Currently patient denies any SI,HI,AH,VHAs per notes patient has repeatedly made suicidal statements, showing depressed mood, loss of interest/please, low self esteem. Patient was Rx PO Bactrim on 05/17 for UTIs. Patient is incontinent, denies any urinary complaints. Uses wheelchair.    ED Course:   VS: 97.8F, HR 66, /74, RR 16, 94% on RA  Labs: Hgb 12.5, WBC 8.2, Trop T 10, U/A negative  CT head: no acute intracranial bleeding

## 2022-05-26 NOTE — PROGRESS NOTE ADULT - SUBJECTIVE AND OBJECTIVE BOX
Ofe Curran MD  EM/IM PGY-1  Pager # [phone] or Teams  --------------------------------  ==============UNFINISHED NOTE==================    INTERVAL HPI/OVERNIGHT EVENTS:    SUBJECTIVE: Patient seen and examined at bedside.     VITAL SIGNS:  T(C): 36.8 (22 @ 05:58), Max: 36.8 (22 @ 21:07)  HR: 66 (22 @ 05:58) (64 - 70)  BP: 114/82 (22 @ 05:58) (114/82 - 142/55)  RR: 18 (22 @ 05:58) (16 - 19)  SpO2: 99% (22 @ 05:58) (94% - 100%)      PHYSICAL EXAM:        MEDICATIONS:  MEDICATIONS  (STANDING):  atorvastatin 80 milliGRAM(s) Oral at bedtime  cholecalciferol 2000 Unit(s) Oral daily  enoxaparin Injectable 40 milliGRAM(s) SubCutaneous every 24 hours  FLUoxetine 40 milliGRAM(s) Oral daily  lactated ringers. 1000 milliLiter(s) (100 mL/Hr) IV Continuous <Continuous>  lisinopril 20 milliGRAM(s) Oral daily  methimazole 5 milliGRAM(s) Oral daily  rOPINIRole 2 milliGRAM(s) Oral at bedtime    MEDICATIONS  (PRN):  acetaminophen     Tablet .. 650 milliGRAM(s) Oral every 6 hours PRN Temp greater or equal to 38C (100.4F), Mild Pain (1 - 3)      LABS:                        11.9   7.23  )-----------( 223      ( 26 May 2022 01:37 )             37.4         138  |  103  |  24<H>  ----------------------------<  93  4.4   |  23  |  0.94    Ca    9.4      26 May 2022 01:37  Phos  3.6       Mg     2.00         TPro  6.5  /  Alb  3.9  /  TBili  0.6  /  DBili  x   /  AST  29  /  ALT  28  /  AlkPhos  57  -    PT/INR - ( 26 May 2022 01:37 )   PT: 13.6 sec;   INR: 1.17 ratio         PTT - ( 26 May 2022 01:37 )  PTT:30.9 sec  Urinalysis Basic - ( 26 May 2022 00:45 )    Color: Yellow / Appearance: Clear / S.027 / pH: x  Gluc: x / Ketone: Trace  / Bili: Negative / Urobili: 6 mg/dL   Blood: x / Protein: 30 mg/dL / Nitrite: Negative   Leuk Esterase: Negative / RBC: <5 /HPF / WBC 0-2 /HPF   Sq Epi: x / Non Sq Epi: 0-2 /HPF / Bacteria: Negative         Ofe Curran MD  EM/IM PGY-1  Pager # [phone] or Teams  --------------------------------    INTERVAL HPI/OVERNIGHT EVENTS: Admitted overnight, transfer from [facility] for episode of unresponsive ness, vitals stable and normal overnight. No acute events    SUBJECTIVE: Patient seen and examined at bedside. Denies pain, shortness of breath, reports being confused about where she is and why she is here.    VITAL SIGNS:  T(C): 36.8 (22 @ 05:58), Max: 36.8 (22 @ 21:07)  HR: 66 (22 @ 05:58) (64 - 70)  BP: 114/82 (22 @ 05:58) (114/82 - 142/55)  RR: 18 (22 @ 05:58) (16 - 19)  SpO2: 99% (22 @ 05:58) (94% - 100%)      PHYSICAL EXAM:    GENERAL: Sitting comfortably in bed in no acute distress  NEURO: Alert and Oriented to self only, knew she was in the hospital and the year  with queueing. Pupils symmetric, No ptosis. No facial asymmetry or dysarthria, no tremor noted.  HEENT: No conjunctival injection or scleral icterus.   CARD: Normal rate and regular rhythm, 2/6 systolic murmur heard best at R upper sternal border, no gallops appreciated.  RESP: Clear to auscultation bilaterally, No wheezes, rales or rhonchi. Good respiratory effort. Able to sit up and lean forward for exam.  ABD: Bowel sounds active. Nondistended, Soft and nontender to palpation in all quadrants, no guarding, no rigidity. No masses appreciated.  EXT: No pedal edema. 2+DP pulses bilaterally.    MEDICATIONS:  MEDICATIONS  (STANDING):  atorvastatin 80 milliGRAM(s) Oral at bedtime  cholecalciferol 2000 Unit(s) Oral daily  enoxaparin Injectable 40 milliGRAM(s) SubCutaneous every 24 hours  FLUoxetine 40 milliGRAM(s) Oral daily  lactated ringers. 1000 milliLiter(s) (100 mL/Hr) IV Continuous <Continuous>  lisinopril 20 milliGRAM(s) Oral daily  methimazole 5 milliGRAM(s) Oral daily  rOPINIRole 2 milliGRAM(s) Oral at bedtime    MEDICATIONS  (PRN):  acetaminophen     Tablet .. 650 milliGRAM(s) Oral every 6 hours PRN Temp greater or equal to 38C (100.4F), Mild Pain (1 - 3)      LABS:                        11.9   7.23  )-----------( 223      ( 26 May 2022 01:37 )             37.4         138  |  103  |  24<H>  ----------------------------<  93  4.4   |  23  |  0.94    Ca    9.4      26 May 2022 01:37  Phos  3.6       Mg     2.00         TPro  6.5  /  Alb  3.9  /  TBili  0.6  /  DBili  x   /  AST  29  /  ALT  28  /  AlkPhos  57      PT/INR - ( 26 May 2022 01:37 )   PT: 13.6 sec;   INR: 1.17 ratio         PTT - ( 26 May 2022 01:37 )  PTT:30.9 sec  Urinalysis Basic - ( 26 May 2022 00:45 )    Color: Yellow / Appearance: Clear / S.027 / pH: x  Gluc: x / Ketone: Trace  / Bili: Negative / Urobili: 6 mg/dL   Blood: x / Protein: 30 mg/dL / Nitrite: Negative   Leuk Esterase: Negative / RBC: <5 /HPF / WBC 0-2 /HPF   Sq Epi: x / Non Sq Epi: 0-2 /HPF / Bacteria: Negative

## 2022-05-26 NOTE — H&P ADULT - PROBLEM SELECTOR PLAN 6
- hx of MS  - wheelchair bound, incontinent   - pt recieves Plegridy q 2 weeks - next dose 05/30   - f/u neuro outpatient - hx of MS  - wheelchair bound, incontinent   - pt recieves Plegridy q 2 weeks - next dose 05/30   - f/u neuro outpatient    Daughter said patient's  will bring in medication on 5/30 as it needs to stay refrigerated until administration. Family aware next dose due on 5/30. - hx of MS  - wheelchair bound, incontinent   - pt receives Plegridy q 2 weeks - next dose 05/30   - f/u neuro outpatient    Daughter said patient's  will bring in medication on 5/30 as it needs to stay refrigerated until administration. Family aware next dose due on 5/30. - continue methimazole - continue methimazole  - FT3 = 3.03 on 05/19/2022 (within acceptable range)

## 2022-05-26 NOTE — H&P ADULT - PROBLEM SELECTOR PLAN 1
Although concern for recent nonconvulsive seizure event, there currently appears not objective evidence (VS changes, tonic/clonic movements, abnormal pupil changes, tongue biting) will hold off on loading with AED now until more workup is pursued in order to prevent worsening of her mental status.    - neurology following: apprec recs  - started atorvastatin 80 mg qd  - f/u infectious workup, ammonia, CPK  - f/u MR brain w/ and w/o IV contrast  - 24 hour EEG  - Neuro-checks and VS q4h  - aspiration, fall, seizure precautions    - If witness a generalized tonic (increased tone) / clonic (shaking) episode lasting >3 min or with vital sign changes, can consider ativan 1mg x1 IV. Please call neurology prior to administering.     - Given concern for seizure, advise patient to not drive, operate heavy machinery, avoid heights, pools, bathtubs, locked doors until cleared by further follow up outpatient by neurology.   - Please note: if patient has a convulsion, please document length of episode, specifically what patient was doing paying attention to eye opening vs closure, gaze deviation, shaking of extremities, tongue bite, urinary incontinence, any derangement of vital signs. Generalized motor seizures can have dilated and unreactive pupils, absent oculocephalic reflexes (no dolls eyes), and open eyelids (99% of cases). Head turning from sided to side, pelvic thrusting, bicycling movements, hand waving are NOT manifestations of generalized motor seizures. Although concern for recent nonconvulsive seizure event, there currently appears not objective evidence (VS changes, tonic/clonic movements, abnormal pupil changes, tongue biting) will hold off on loading with AED now until more workup is pursued in order to prevent worsening of her mental status.    - neurology following: apprec recs  - started atorvastatin 80 mg qd  - f/u infectious workup, ammonia, CPK  - f/u MR brain w/ and w/o IV contrast  - 24 hour EEG  - Neuro-checks and VS q4h  - aspiration, fall, seizure precautions    - If witness a generalized tonic (increased tone) / clonic (shaking) episode lasting >3 min or with vital sign changes, can consider ativan 1mg x1 IV. Please call neurology prior to administering.     - Please note: if patient has a convulsion, please document length of episode, specifically what patient was doing paying attention to eye opening vs closure, gaze deviation, shaking of extremities, tongue bite, urinary incontinence, any derangement of vital signs. Generalized motor seizures can have dilated and unreactive pupils, absent oculocephalic reflexes (no dolls eyes), and open eyelids (99% of cases). Head turning from sided to side, pelvic thrusting, bicycling movements, hand waving are NOT manifestations of generalized motor seizures. Presented with unresponsiveness, eye rolling and tongue movements  Although concern for recent nonconvulsive seizure event, there currently appears not objective evidence (VS changes, tonic/clonic movements, abnormal pupil changes, tongue biting); will hold off on loading with AED now until more workup is pursued in order to prevent worsening of her mental status.    - neurology following: apprec recs (some recommendations noted here)  - started atorvastatin 80 mg qd  - f/u infectious workup, ammonia, CPK  - f/u MR brain w/ and w/o IV contrast  - 24 hour EEG  - Neuro-checks and VS q4h  - aspiration, fall, seizure precautions    - If witness a generalized tonic (increased tone) / clonic (shaking) episode lasting >3 min or with vital sign changes, can consider ativan 1mg x1 IV. Please call neurology prior to administering.     - Please note: if patient has a convulsion, please document length of episode, specifically what patient was doing paying attention to eye opening vs closure, gaze deviation, shaking of extremities, tongue bite, urinary incontinence, any derangement of vital signs. Generalized motor seizures can have dilated and unreactive pupils, absent oculocephalic reflexes (no dolls eyes), and open eyelids (99% of cases). Head turning from sided to side, pelvic thrusting, bicycling movements, hand waving are NOT manifestations of generalized motor seizures. Presented with unresponsiveness, eye rolling and tongue movements  Although concern for recent nonconvulsive seizure event, there currently appears not objective evidence (VS changes, tonic/clonic movements, abnormal pupil changes, tongue biting); will hold off on loading with AED now until more workup is pursued in order to prevent worsening of her mental status.    In the setting of patient with multiple sclerosis  - neurology following: apprec recs (some recommendations noted here)  - started atorvastatin 80 mg qd  - f/u infectious workup, ammonia, CPK  - f/u MR brain w/ and w/o IV contrast  - 24 hour EEG  - Neuro-checks and VS q4h  - aspiration, fall, seizure precautions    - If witness a generalized tonic (increased tone) / clonic (shaking) episode lasting >3 min or with vital sign changes, can consider ativan 1mg x1 IV. Please call neurology prior to administering.     - Please note: if patient has a convulsion, please document length of episode, specifically what patient was doing paying attention to eye opening vs closure, gaze deviation, shaking of extremities, tongue bite, urinary incontinence, any derangement of vital signs. Generalized motor seizures can have dilated and unreactive pupils, absent oculocephalic reflexes (no dolls eyes), and open eyelids (99% of cases). Head turning from sided to side, pelvic thrusting, bicycling movements, hand waving are NOT manifestations of generalized motor seizures.

## 2022-05-26 NOTE — OCCUPATIONAL THERAPY INITIAL EVALUATION ADULT - LIVES WITH, PROFILE
Per chart, "Pt initially presented to [facility] ED on 5/12 after cutting herself with a butter knife at the [facility], where she has been living since 11/15/21in the memory unit. She was d/c'd back to [facility], only to re-present on 5/18 for ongoing SI and depression. Pt had said that she doesn't like the nursing home she lives at and doesn't like to be told what to do or what to eat. Stated if she didn't live there, she would use a gun to kill herself. Pt was txfrd to [facility] on 5/18 (first lifetime psych hospitalization) where she received ECT and became unresponsive."

## 2022-05-26 NOTE — H&P ADULT - PROBLEM SELECTOR PLAN 2
Severe episode of recurrent major depressive disorder, without psychotic features  - patient with hx of MDD and anxiety,    - as per notes patient tried cutting her wrists with butter knife on 5/12/22  - psych following   - continue constant observation  - Ativan 1 mg po/im prn agitation  - Continue  Prozac 40 mg daily, Klonopin 0.5mg, and Trazodone 100mg hs  - dispo-  psychiatric facility when jr-beds available. Severe episode of recurrent major depressive disorder, without psychotic features  - patient with hx of MDD and anxiety,    - as per notes patient tried cutting her wrists with butter knife on 5/12/22  - psych consult in am  - continue constant observation  - Continue  Prozac 40 mg daily; Holding home Klonopin 0.5mg, and Trazodone 100mg hs  - dispo-  psychiatric facility when jr-beds available. Severe episode of recurrent major depressive disorder, without psychotic features  - patient with hx of MDD and anxiety,    - as per notes patient tried cutting her wrists with butter knife on 5/12/22  - psych consult in am  - Continue Prozac 40 mg daily; Holding home Klonopin 0.5mg, and Trazodone 100mg hs  - dispo-  psychiatric facility when jr-beds available  - continue 1:1 Severe episode of recurrent major depressive disorder, without psychotic features  - patient with hx of MDD and anxiety,    - as per notes patient tried cutting her wrists with butter knife on 5/12/22  - Psych consult in am  - Continue Prozac 40 mg daily; Holding home Klonopin 0.5mg, and Trazodone 100mg hs  - dispo-  psychiatric facility when jr-beds available  - continue 1:1 Severe episode of recurrent major depressive disorder, without psychotic features  - patient with hx of MDD and anxiety,    - as per notes patient tried cutting her wrists with butter knife on 5/12/22  - Psych consult in am  - Continue Prozac 40 mg daily; Holding home Klonopin 0.5mg, and Trazodone 100mg hs  - dispo-  psychiatric facility when jr-beds available  - continue 1:1  - f/u TSH level  - already on vitamin D

## 2022-05-26 NOTE — BH INPATIENT PSYCHIATRY DISCHARGE NOTE - NSBHMETABOLIC_PSY_ALL_CORE_FT
BMI: BMI (kg/m2): 35.2 (05-25-22 @ 22:48)  HbA1c: A1C with Estimated Average Glucose Result: 5.5 % (05-26-22 @ 01:37)    Glucose: POCT Blood Glucose.: 90 mg/dL (05-26-22 @ 02:41)    BP: 142/55 (05-25-22 @ 21:07) (142/55 - 142/55)  Lipid Panel: Date/Time: 05-26-22 @ 01:37  Cholesterol, Serum: 167  Direct LDL: --  HDL Cholesterol, Serum: 74  Total Cholesterol/HDL Ration Measurement: --  Triglycerides, Serum: 48

## 2022-05-26 NOTE — BH DISCHARGE NOTE NURSING/SOCIAL WORK/PSYCH REHAB - NSCDUDCCRISIS_PSY_A_CORE
[facility]  [phone] [facility]-WELL ([phone])  Text "WELL" to 27662  Website: www.[employer]/.[employer] [facility] 1 (279) 831-HPBY (9307) Website: www.safehorizon.org/.National Suicide Prevention Lifeline 9 (809) 485-7053/.  [facility]  1 (192) LIFENET (857-8008)/.  [facility]  [phone] [place] [phone]   Hours:  Monday through Friday from 9 AM to 3 PM/.  [employer] - [facility] Crisis Line  [phone], Option 1

## 2022-05-26 NOTE — H&P ADULT - ATTENDING COMMENTS
72 year old female, with past history as noted above, presented with change in mental status after ECT therapy.  Possibility of seizure post therapy.  CT scan of the head negative for any acute findings.  ECG = NSR at 68 bpm.  Being seen by Neurology team (recommendations noted and appreciated).  F/up electrolytes.  Ensure optimal hydration (IVF prescribed).  Psychiatry/Behavioral Health consult in the AM.    All other management as prescribed.

## 2022-05-26 NOTE — PROGRESS NOTE ADULT - PROBLEM SELECTOR PLAN 6
- continue methimazole  - FT3 = 3.03 on 05/19/2022 (within acceptable range) - hx of MS  - wheelchair bound, incontinent   - pt receives Plegridy q 2 weeks - next dose 05/30   - f/u neuro outpatient for same    Daughter said patient's  will bring in medication on 5/30 as it needs to stay refrigerated until administration. Family aware next dose due on 5/30.

## 2022-05-26 NOTE — BH CONSULTATION LIAISON ASSESSMENT NOTE - MSE UNSTRUCTURED FT
On exam, pt was in bed, with EEG in place, awake, alert, and oriented. A bit dramatic, stating over and over that she was "very confused about everything" and yet that she was very "relaxed right now." She denied overt psych concerns when asked about depressed mood, anxiety, panic, SI, HI, or AVH. She kept deferring to rest. Exam was negative for rigidity, mutism, stereotypies, or cogwheeling (did not appear catatonic).

## 2022-05-26 NOTE — OCCUPATIONAL THERAPY INITIAL EVALUATION ADULT - DIAGNOSIS, OT EVAL
r/o nonconvulsive seizure; r/o toxic/metabolic/infectious process causing encephalopathy; r/o CVA; Decreased ability to follow commands; Decreased functional mobility; Decreased ADL management

## 2022-05-26 NOTE — BH INPATIENT PSYCHIATRY DISCHARGE NOTE - REASON FOR ADMISSION
Pt was admitted to [facility] due to increased depression and suicidal thinking in the setting of recent transition to rehab facility. Pt with recent suicide attempt (which she admitted to writer) on 5/12/22.

## 2022-05-26 NOTE — BH CONSULTATION LIAISON ASSESSMENT NOTE - NSBHCHARTREVIEWVS_PSY_A_CORE FT
Vital Signs Last 24 Hrs  T(C): 36.8 (26 May 2022 05:58), Max: 36.8 (25 May 2022 21:07)  T(F): 98.2 (26 May 2022 05:58), Max: 98.2 (25 May 2022 21:07)  HR: 66 (26 May 2022 05:58) (64 - 70)  BP: 114/82 (26 May 2022 05:58) (114/82 - 142/55)  BP(mean): 71 (26 May 2022 05:02) (71 - 71)  RR: 18 (26 May 2022 05:58) (16 - 19)  SpO2: 99% (26 May 2022 05:58) (94% - 100%)

## 2022-05-26 NOTE — PHYSICAL THERAPY INITIAL EVALUATION ADULT - PERTINENT HX OF CURRENT PROBLEM, REHAB EVAL
Patient is 73 y/o Obese F PMHx Dementia (A+Ox1-2), MDD (hx of SI), T2DM, HTN, HLD, MS, COPD (not on home O2)  presents from [facility] for unresponsiveness after receiving her first session of ECT today

## 2022-05-26 NOTE — H&P ADULT - NSICDXFAMILYHX_GEN_ALL_CORE_FT
FAMILY HISTORY:  No pertinent family history in first degree relatives     FAMILY HISTORY:  Patient unable to provide medical history, ~ due to cognitive impairment

## 2022-05-26 NOTE — BH CONSULTATION LIAISON ASSESSMENT NOTE - HPI (INCLUDE ILLNESS QUALITY, SEVERITY, DURATION, TIMING, CONTEXT, MODIFYING FACTORS, ASSOCIATED SIGNS AND SYMPTOMS)
73 y/o Obese F PMHx Dementia (A+Ox1-2), MDD (hx of SI), T2DM, HTN, HLD, MS, COPD (not on home O2)  presented from [facility] for unresponsiveness. Psych consulted for ongoing follow-up    Chart reviewed. Per initial medical H&P: "pt presented after receiving her first session of ECT today." It should be noted that pt never did receive ECT at [facility], as this was planned for Fri 5/27). Remainder of initial medical H&P: "History obtained from EMS. Patient MARGI 7:30PM today when last visited by . Was noted to be groggy at 8:45 PM. Then around 9:05 was noted to have a 15-20 second period of unresponsiveness and tongue moving side to side with possible lateral gaze deviation for which there was concern of seizure. Patient was not responding. On initial ED evaluation patient was also not responding briefly nor following commands. On re-evaluation, was slightly more awake but still responding in few words. Unclear if now baseline. She initially presented to [facility] on 5/19/22 from [facility] for depression and SI. As per ED notes patient stated she doesn't like the nursing home she lives at and doesn't like to be told what to do or what to eat. States if she didn't live there, and live outside normally she would use a gun to kill herself. Pt denies HI. Sent to ED for pysch evaluation. As per facility notes on 5/12/22 patient was observed using a butter knife to cut her wrists. She was sent to [facility] ER and returned back to [facility] on 5/13/22. At bedside patient denies any plans to hurt herself at the time and stated she was frustrated. Currently patient denies any SI,HI,AH,VHAs per notes patient has repeatedly made suicidal statements, showing depressed mood, loss of interest/please, low self esteem. Patient was Rx PO Bactrim on 05/17 for UTIs. Patient is incontinent, denies any urinary complaints. Uses wheelchair."    Signout from inpatient [facility] provider obtained as follows: "72 year old woman, with daughter, domiciled at [facility] and rehab facility (since November 15 2021-transferred several months ago to memory unit); with  history of MDD and anxiety and no known psychiatric  hospitalizations; suicide attempt 5/12/22; no known history of violence or arrests; no active substance abuse or h/o complicated withdrawal, PMhx Multiple Sclerosis on plegridy q 2 weekly (next due on 5/30, daughter who is HCP states she would bring it in), h/o UTI at present on Bactrim (5/17-5/25), COPD, DM type II, obesity, primary HTN, DLD, vascular dementia (AAOx2 at baseline, hyperthyroidism, adult FTT, sent to ER from NH for SI.  She reports a long h/o depression with worsening symptoms the past several months in the context of loss of independence and move to a long-term skilled nursing facility.  Patient currently endorses symptoms of depression including depressed mood every day throughout the day, poor appetite, low energy, poor concentration and memory, hypersomnia and suicidal ideation. Patient reports if she could find a way to kill herself she would but has not been able to find a way.  Patient does not like living in the nursing home and does not see herself getting any better. She's also been paranoid about staff at the nursing facility, stating they talk about her. She reports she use to be able to ambulate with a walker and is now wheel chair bound. Of note, despite what the notes have been saying, the pt HAS NOT received ECT yet. The consult was placed 5/25 with plans to start on Friday 5/27. Her daughter, April (jr nurse, HCP) consents to the procedure, the patient has assented. April noted that the pt has had a hx of possible catatonia (which presented as unresponsiveness) a few months ago. She states that it was never confirmed (and unsure if given trials of Ativan) though seemed to self-resolve. Also initially when she came in, she would not open her eyes for us despite sternal rub. Manually opening her eyelids worked though and she became more alert. DDx: MDD with psychosis, some cluster B traits; Current psych medications: Prozac 40 mg daily; Seroquel 12.5 mg qhs; Requip 2mg HS; PRNs: Ativan 0.5 mg PO/IM q6h for agitation"     On exam, pt was in bed, with EEG in place, awake, alert, and oriented. A bit dramatic, stating over and over that she was "very confused about everything" and yet that she was very "relaxed right now." She denied overt psych concerns when asked about depressed mood, anxiety, panic, SI, HI, or AVH. She kept deferring to rest. Exam was negative for rigidity, mutism, stereotypies, or cogwheeling (did not appear catatonic).

## 2022-05-26 NOTE — H&P ADULT - NSICDXPASTMEDICALHX_GEN_ALL_CORE_FT
PAST MEDICAL HISTORY:  Diabetes     Hypertension     Hypothyroidism     Major depression     Multiple sclerosis      PAST MEDICAL HISTORY:  Gait difficulty ~ wheelchair    History of COPD ~ not on home oxygen    Hypertension     Hypothyroidism     Major depression     Multiple sclerosis     Suicidal ideation     Type 2 diabetes mellitus     Vascular dementia     Vitamin D deficiency

## 2022-05-26 NOTE — PROGRESS NOTE ADULT - ASSESSMENT
[ x ]  Lab studies reviewed  [ x ]  Radiology reviewed  [ x ]  Old records reviewed    73 y/o Obese F PMHx Dementia (A+Ox1-2), MDD (hx of SI), T2DM, HTN, HLD, MS, COPD (not on home O2)  presents from [facility] for unresponsiveness after receiving her first session of ECT today. Concern for nonconvulsive seizure event potentially precipitated by recent ECT therapy versus toxic/metabolic/infectious process causing encephalopathy w/ low suspicion for stroke given nonlateralizing deficits.  72 yF PMHx vascular Dementia (A+Ox1-2), MDD (hx of SI), HTN, HLD, MS, COPD (not on home O2)  presents from [facility] for unresponsiveness for 10-15 seconds. Admitted for concern for seizure event.

## 2022-05-26 NOTE — BH SAFETY PLAN - WARNING SIGN 1
Due to a medical emergency patient was admitted to [facility]. Patient did not complete a safety plan.

## 2022-05-26 NOTE — H&P ADULT - PROBLEM SELECTOR PLAN 9
DVT ppx: Lovenox SQ 40mg QD  GOC - DNR/DNI/DNH- MOLST in chart  Dispo: pending PT consult, likely back to [facility]  Diet: NPO pending dysphagia screen once more responsive    DNR/DNI with comfort measures - MOLST in chart DVT ppx: Lovenox SQ 40mg QD  GOC - DNR/DNI/DNH- MOLST in chart - comfort measures only  Dispo: pending PT consult, likely back to [facility]  Diet: NPO pending dysphagia screen once more responsive

## 2022-05-26 NOTE — H&P ADULT - NSHPSOCIALHISTORY_GEN_ALL_CORE
Lives at [facility]. Has a  (second marriage of 30 years) and a daughter. Lives at [facility]. Has a  (second marriage of 30 years) and a daughter. Uses a wheelchair. Lives at [facility]. Has a  (second marriage of 30 years) and a daughter.   Uses a wheelchair.

## 2022-05-26 NOTE — PROGRESS NOTE ADULT - PROBLEM SELECTOR PLAN 4
appears to be diet controlled  - consistent carb diet when optimal  - f/u A1c in the AM; if elevated, would start ISS per FS + UTI dx on 05/17  - pt was Rx PO Bactrim on 05/17, uncertain how long this was continued, notes on both 5/21 and 5/24 state last day of abx  - Repeat UA without signs of infection  - No further treatment

## 2022-05-26 NOTE — BH DISCHARGE NOTE NURSING/SOCIAL WORK/PSYCH REHAB - PATIENT PORTAL LINK FT
You can access the FollowMyHealth Patient Portal offered by [facility] by registering at the following website: [email or web address]. By joining [facility]’s FollowMyHealth portal, you will also be able to view your health information using other applications (apps) compatible with our system.

## 2022-05-26 NOTE — PROGRESS NOTE ADULT - ATTENDING COMMENTS
72W with PMH MDD with hx of SI and prior attempts,? dementia, T2DM, HTN, HLD, MS, COPD who is presenting from [facility] with unresponsive episode PRIOR to her first session of ECT.   This morning she is alert and AAOx2-3. Conversive, states she is unsure of event leading her to hospital. States her mood is improved. No headaches vision changes, weakness.  Currently on 24H EEG, but seizures seems unlikely given that she never received ECT, will need to determine if this was medication induced  CT head with chronic findings, will get .

## 2022-05-26 NOTE — BH INPATIENT PSYCHIATRY DISCHARGE NOTE - HPI (INCLUDE ILLNESS QUALITY, SEVERITY, DURATION, TIMING, CONTEXT, MODIFYING FACTORS, ASSOCIATED SIGNS AND SYMPTOMS)
Patient is a 72 year old, , female;  with daughter, currently domiciled at [facility] rehab facility (living there since November 15 2021-transferred several months ago to memory unit)  ; with  history of major depressive disorder and anxiety, ; no known psychiatric  hospitalizations; questionable suicide attempt 5/12/22; no known history of violence or arrests; no active substance abuse or h/o complicated withdrawal, , PMhx Multiple Sclerosis, h/o UTI, COPD, DM type II, obesity, primary HTN, hyperlipidemia, vascular dementia, hypothyroidism, adult failure to thrive, sent to ER from NH for suicidal ideation.     Patient is A&O x 2 ( knows she is in the hospital). She reports a long h/o depression with worsening symptoms the past several months in the context of loss of independence and move to a long term skilled nursing facility. Patient is not able to remember the name of the facility but stated, " I hate it there." Patient currently  endorses symptoms of depression including depressed mood every day throughout the day, poor appetite, low energy, poor concentration and memory, hypersomnia and suicidal ideation. Patient reports if she could find a way to kill herself she would but has not been able to find a way.  Patient does not like living in the nursing home and does not see herself getting any better. She reports she use to be able to ambulate with a walker and is now wheel chair bound. Patient states she would miss her family if she killed herself but states she has experienced extreme loss in the past and knows they will recover.    As far as other psychiatric symptoms, patient denies anxiety, and denies any current or recent auditory/visual hallucinations, thoughts of paranoia or ideas of reference. She also denies symptoms of sameera.  Of note, patient was seen in the ED at [facility] 5/12/22 after attempting to cut wrist with a butter knife.  Patient was evaluated by psychiatry and discharged back to her residence. She was seen today at [facility] by her psychiatrist and reported suicidal ideation.     Received professional collateral from patient's psychiatrist Dr. Alexander [phone]. Patient was placed on constant observation s/p discharge from [facility] pending visit with Dr. Alexander. Today patient was seen by Dr. Alexander and verbalized active suicidal ideation. Patient's depressive symptoms have worsened in the past week. Previously patent would engage in activities and appeared to enjoy visits with her . Dr. Alexander suspects patient's family recently told her she is not returning home and this triggered her worsening symptoms. Patient is currently prescribed Prozac 40 mg daily, Klonopin0.5mg daily and Trazodone 100 mg hs. See  note for personal collateral.    On [facility] 2S unit, pt nonverbal, A&Ox3, sedated from ?IM given in ED. Resting comfortably, no acute distress or pain. ED note above duly noted and appreciated. Recent hx raises question about possible need for and benefits from a mood stabilizer rather than SSRI/prozac. Hx of depression and anxiety and desire by pt to find implements to harm self, and even attempts to do so with a butter knife. See PLAN section. Unclear hx re parkinsonian symptoms vs restless legs (as per ED note) and role/effectiveness of ropinirole. Apparently recent UTI but unable to ask pt if symptoms for determination of any follow up care. RN team requested MED bed, hence CO order needed from 11 PM to 7 AM. Pt apparently has DNR.

## 2022-05-26 NOTE — PROGRESS NOTE ADULT - PROBLEM SELECTOR PLAN 8
- DASH diet   - continue hctz 25mg QD and lisinopril 20mg QD w/ hold parameters DVT ppx: Lovenox SQ 40mg QD  GOC - DNR/DNI/DNH- MOLST in chart - comfort measures only  Dispo: pending PT consult, likely back to [facility]  Diet: Soft and bite sized

## 2022-05-26 NOTE — PROGRESS NOTE ADULT - PROBLEM SELECTOR PLAN 3
- CT head Severe chronic microvascular ischemic changes.  - at bedside patient is A+Ox1-2   - UA negative  - f/u vitamin b12, folate, RPR - CT head 5/18 and 5/25 with Severe chronic microvascular ischemic changes.  - At bedside patient is A+Ox1-2, awake and alert and follows commands  - UA negative, CXR neg, doesn't appear different from baseline documented at [facility] inpt notes

## 2022-05-26 NOTE — H&P ADULT - PROBLEM SELECTOR PLAN 4
+ UTI dx on 05/17  - pt was Rx PO Bactrim on 05/17   - repeat UA and Urine cx ordered   - pt received two doses of PO Bactrim  - continue PO abx. + UTI dx on 05/17  - pt was Rx PO Bactrim on 05/17   - repeat UA and Urine cx ordered   - stop PO abx. + UTI dx on 05/17  - pt was Rx PO Bactrim on 05/17   - repeat UA and Urine cx ordered   - stop PO abx ISS  A1c pending ISS appears to be diet controlled  - consistent carb diet  - f/u A1c in the AM; if elevated, would start ISS per FS appears to be diet controlled  - consistent carb diet when optimal  - f/u A1c in the AM; if elevated, would start ISS per FS

## 2022-05-26 NOTE — BH INPATIENT PSYCHIATRY DISCHARGE NOTE - NSBHDCMEDICALFT_PSY_A_CORE
Pt with an episode of AMS on 5/25 with concerns of seizure-like activity. Pt transferred and admitted to [facility] for further workup

## 2022-05-26 NOTE — PROGRESS NOTE ADULT - PROBLEM SELECTOR PLAN 2
Severe episode of recurrent major depressive disorder, without psychotic features  - patient with hx of MDD and anxiety,    - as per notes patient tried cutting her wrists with butter knife on 5/12/22  - Psych consult in am  - Continue Prozac 40 mg daily; Holding home Klonopin 0.5mg, and Trazodone 100mg hs  - dispo-  psychiatric facility when jr-beds available  - continue 1:1  - f/u TSH level  - already on vitamin D - Severe episode of recurrent major depressive disorder, without psychotic features  - Patient with hx of MDD and anxiety, before being admitted was on prozac 40mg qd, trazodone 100mg qhs, and klonipin 0.5ms qd, since she has been at [facility] medication changes were as follows: Continued prozac, DC klonipin and trazodone for over-sedation, started Quetiapine 12.5mg qd and were planning for ECT  - As per notes patient tried cutting her wrists with butter knife on 5/12/22  - Continue Prozac 40 mg daily  - TSH wnl 0.46 on 5/18 with normal triiodothyronine at 3.03  - On vitamin D supplementation at home  - Pending psych consult

## 2022-05-26 NOTE — BH CONSULTATION LIAISON ASSESSMENT NOTE - SUMMARY
71 y/o Obese F PMHx Dementia (A+Ox1-2), MDD (hx of SI), T2DM, HTN, HLD, MS, COPD (not on home O2)  presented from [facility] for unresponsiveness. Psych consulted for ongoing follow-up. Pt had de paco period of unresponsiveness at [facility] that warrants medical/neuro evaluations. ECT was being considered but never actually administered. Differential includes medical etiology vs behavioral (psychogenic seizure).     Plan:   - Continue usual psychotropics: Prozac 20mg daily, Requip 2mg HS, Seroquel 12.5mg HS, and Ativan 0.5mg PO Q 6PRN anxiety  - EEG, MRI, and other neuro w/u as you are  - Maintain 1:1 given impulsive behavior and inability to contract for safety  - Will continue to follow  - Dispo: likely return to [facility] when medically cleared.

## 2022-05-26 NOTE — H&P ADULT - NSHPREVIEWOFSYSTEMS_GEN_ALL_CORE
REVIEW OF SYSTEMS:  CONSTITUTIONAL: No fever, weight loss, or fatigue  EYES: No eye pain, visual disturbances, or discharge  ENT:  No difficulty hearing, tinnitus, vertigo; No sinus or throat pain  NECK: No pain or stiffness  BREASTS: No pain, masses, or nipple discharge  RESPIRATORY: No cough, wheezing, chills or hemoptysis; No shortness of breath  CARDIOVASCULAR: No chest pain, palpitations, dizziness, or leg swelling  GASTROINTESTINAL: No abdominal or epigastric pain. No nausea, vomiting, or hematemesis; No diarrhea or constipation. No melena or hematochezia.  GENITOURINARY: No dysuria, frequency, hematuria, or incontinence  NEUROLOGICAL: No headaches, memory loss, loss of strength, numbness, or tremors  SKIN: No itching, burning, rashes, or lesions   LYMPH NODES: No enlarged glands  ENDOCRINE: No heat or cold intolerance; No hair loss  MUSCULOSKELETAL: No joint pain or swelling; No muscle, back, or extremity pain  PSYCHIATRIC: No depression, anxiety, mood swings, or difficulty sleeping  HEME/LYMPH: No easy bruising, or bleeding gums  ALLERGY AND IMMUNOLOGIC: No hives or eczema REVIEW OF SYSTEMS:  Unable to obtain ROS due to poor mentation.

## 2022-05-26 NOTE — BH INPATIENT PSYCHIATRY DISCHARGE NOTE - OTHER PAST PSYCHIATRIC HISTORY (INCLUDE DETAILS REGARDING ONSET, COURSE OF ILLNESS, INPATIENT/OUTPATIENT TREATMENT)
As per the medical record and interview with the patient on the psychiatric simms today, 72 year old  mother of an adult daughter, domiciled at [facility] memory unit); PPH anxiety and depressive sx's, no known psychiatric  hospitalizations, suicide attempts, history of violence or arrests; no active substance abuse or h/o complicated withdrawal, CC possible suicidal ideation after being found attempting to cut her wrist with a butter knife.     In ED patient reported "dealing with depression for most of her adult life."  In the week prior to admission, at the residence dining room she passed out while eating and was thought to have a vasovagal episode.  On admission she had attempted to cut her wrist with a butter knife and was sent for evaluation. She was noted to have a red gretchen but did not break skin. .  Patient also reportedly made statements that she wants to die at that time. She has not been a behavioral problem. Her lifelong depressive symptoms have been acutely worsening in the context of loss of independence that came with a move to a long-term skilled nursing facility. Pt currently expresses severe hopelessness, low energy, fatigue, suicidality. Pt assents to procedure.

## 2022-05-26 NOTE — H&P ADULT - PROBLEM SELECTOR PLAN 8
DVT ppx: Lovenox SQ 40mg QD  GOC - DNR/DNI/DNH- MOLST in chart  Dispo: pending PT consult, likely back to [facility]  Diet: NPO pending dysphagia screen once more responsive DVT ppx: Lovenox SQ 40mg QD  GOC - DNR/DNI/DNH- MOLST in chart  Dispo: pending PT consult, likely back to [facility]  Diet: NPO pending dysphagia screen once more responsive    DNR/DNI with comfort measures - MOLST in chart - DASH diet   - continue hctz 25mg QD and lisinopril 20mg QD w/ hold parameters

## 2022-05-26 NOTE — OCCUPATIONAL THERAPY INITIAL EVALUATION ADULT - ADDITIONAL COMMENTS
Prior to hospitalization, pt. reports she transferred bed to wheelchair; however, "I don't remember if 1 or 2 people helped me." Pt reports she had assistance with ADL's prior to hospitalization.

## 2022-05-26 NOTE — BH INPATIENT PSYCHIATRY DISCHARGE NOTE - NSDCMRMEDTOKEN_GEN_ALL_CORE_FT
albuterol 90 mcg/inh inhalation aerosol with adapter: 2 puff(s) inhaled 2 times a day, As Needed  atorvastatin 40 mg oral tablet: 1 tab(s) orally once a day  cholecalciferol oral tablet: 2000 unit(s) orally once a day  FLUoxetine 40 mg oral capsule: 1 cap(s) orally once a day  hydroCHLOROthiazide 25 mg oral tablet: 1 tab(s) orally once a day  KlonoPIN 0.5 mg oral tablet: 1 tab(s) orally 3 times a day, As Needed  lisinopril 20 mg oral tablet: 1 tab(s) orally once a day  loratadine 10 mg oral tablet: 1 tab(s) orally once a day  methIMAzole 5 mg oral tablet: 1 tab(s) orally once a day  Plegridy Pen 125 mcg/0.5 mL subcutaneous solution: 1 dose(s) subcutaneous every 2 weeks - next dose 05/30  QUEtiapine 25 mg oral tablet: 0.5 tab(s) orally once a day (at bedtime)  rOPINIRole 2 mg oral tablet: 1 tab(s) orally once a day (at bedtime)  sulfamethoxazole-trimethoprim 800 mg-160 mg oral tablet: 1 tab(s) orally every 12 hours last dose 5/24@1800  traZODone 100 mg oral tablet: 1 tab(s) orally once a day (at bedtime)   albuterol 90 mcg/inh inhalation aerosol with adapter: 2 puff(s) inhaled 2 times a day, As Needed  atorvastatin 40 mg oral tablet: 1 tab(s) orally once a day  cholecalciferol oral tablet: 2000 unit(s) orally once a day  FLUoxetine 40 mg oral capsule: 1 cap(s) orally once a day  lisinopril 20 mg oral tablet: 1 tab(s) orally once a day  loratadine 10 mg oral tablet: 1 tab(s) orally once a day  methIMAzole 5 mg oral tablet: 1 tab(s) orally once a day  Plegridy Pen 125 mcg/0.5 mL subcutaneous solution: 1 dose(s) subcutaneous every 2 weeks - next dose 05/30  QUEtiapine 25 mg oral tablet: 0.5 tab(s) orally once a day (at bedtime)  rOPINIRole 2 mg oral tablet: 1 tab(s) orally once a day (at bedtime)

## 2022-05-26 NOTE — H&P ADULT - NSHPLABSRESULTS_GEN_ALL_CORE
LABS:      The Labs were reviewed by me   The Radiology was reviewed by me    EKG tracing reviewed by me    05-25    136  |  101  |  25<H>  ----------------------------<  99  4.9   |  23  |  0.89  05-23    133<L>  |  100  |  17  ----------------------------<  131<H>  4.4   |  25  |  0.77    Ca    9.5      25 May 2022 22:53  Ca    9.3      23 May 2022 09:55    TPro  7.3  /  Alb  3.9  /  TBili  0.6  /  DBili  x   /  AST  36<H>  /  ALT  32  /  AlkPhos  63  05-25    Magnesium, Serum: 1.90 mg/dL (05-23-22 @ 09:55)    Phosphorus Level, Serum: 3.1 mg/dL (05-23-22 @ 09:55)      PT/INR - ( 25 May 2022 22:53 )   PT: 13.5 sec;   INR: 1.16 ratio         PTT - ( 25 May 2022 22:53 )  PTT:31.5 sec                                        12.5   8.16  )-----------( 225      ( 25 May 2022 22:53 )             39.9     CAPILLARY BLOOD GLUCOSE      POCT Blood Glucose.: 96 mg/dL (25 May 2022 22:21)  POCT Blood Glucose.: 104 mg/dL (25 May 2022 21:08)            RADIOLOGY & ADDITIONAL TESTS:    Imaging Personally Reviewed:  [ ] YES  [ ] NO

## 2022-05-26 NOTE — OCCUPATIONAL THERAPY INITIAL EVALUATION ADULT - PERTINENT HX OF CURRENT PROBLEM, REHAB EVAL
Pt is a 72 year old female with hx of Dementia, MDD (hx of SI), T2DM, HTN, HLD, MS, COPD, recent hospitalizations for SI episodes, who presented to [facility] on 5/26/22 from [facility] for unresponsiveness after receiving her first session of ECT today. Pt admitted with concern for nonconvulsive seizure event potentially precipitated by recent ECT therapy versus toxic/metabolic/infectious process causing encephalopathy with low suspicion for stroke given nonlateralizing deficits.

## 2022-05-26 NOTE — PROGRESS NOTE ADULT - PROBLEM SELECTOR PLAN 7
- hx of MS  - wheelchair bound, incontinent   - pt receives Plegridy q 2 weeks - next dose 05/30   - f/u neuro outpatient for same    Daughter said patient's  will bring in medication on 5/30 as it needs to stay refrigerated until administration. Family aware next dose due on 5/30. - Continue home regimen HCTZ 25mg QD and lisinopril 20mg QD

## 2022-05-26 NOTE — PROGRESS NOTE ADULT - PROBLEM SELECTOR PLAN 9
DVT ppx: Lovenox SQ 40mg QD  GOC - DNR/DNI/DNH- MOLST in chart - comfort measures only  Dispo: pending PT consult, likely back to [facility]  Diet: NPO pending dysphagia screen once more responsive

## 2022-05-26 NOTE — H&P ADULT - PROBLEM SELECTOR PLAN 3
- CT head Severe chronic microvascular ischemic changes.  - at bedside patient is A+Ox1 (self) - as per notes patient has difficulty remembering date   - check UA  - check vitamin b12, folate, rpr. - CT head Severe chronic microvascular ischemic changes.  - at bedside patient is A+Ox1-2   - check UA  - check vitamin b12, folate, rpr. - CT head Severe chronic microvascular ischemic changes.  - at bedside patient is A+Ox1-2   - UA negative  - f/u vitamin b12, folate, rpr - CT head Severe chronic microvascular ischemic changes.  - at bedside patient is A+Ox1-2   - UA negative  - f/u vitamin b12, folate, RPR

## 2022-05-26 NOTE — H&P ADULT - NSICDXPASTSURGICALHX_GEN_ALL_CORE_FT
PAST SURGICAL HISTORY:  No significant past surgical history      PAST SURGICAL HISTORY:  Patient unable to provide medical history ~ due to cognitive impairment

## 2022-05-26 NOTE — H&P ADULT - NSHPPHYSICALEXAM_GEN_ALL_CORE
T(C): 36.6 (05-25-22 @ 22:23), Max: 36.9 (05-25-22 @ 08:04)  HR: 64 (05-25-22 @ 23:17) (64 - 70)  BP: 138/70 (05-25-22 @ 23:17) (138/70 - 142/55)  RR: 17 (05-25-22 @ 23:17) (16 - 17)  SpO2: 100% (05-25-22 @ 23:17) (94% - 100%)  Wt(kg): --Vital Signs Last 24 Hrs  T(C): 36.6 (25 May 2022 22:23), Max: 36.9 (25 May 2022 08:04)  T(F): 97.8 (25 May 2022 22:23), Max: 98.5 (25 May 2022 08:04)  HR: 64 (25 May 2022 23:17) (64 - 70)  BP: 138/70 (25 May 2022 23:17) (138/70 - 142/55)  BP(mean): --  RR: 17 (25 May 2022 23:17) (16 - 17)  SpO2: 100% (25 May 2022 23:17) (94% - 100%)    PHYSICAL EXAM:  GENERAL: NAD, well-groomed, well-developed  HEAD:  Atraumatic, Normocephalic  EYES: EOMI, PERRLA, conjunctiva and sclera clear  ENMT: No tonsillar erythema, exudates, or enlargement; Moist mucous membranes, Good dentition, No lesions  NECK: Supple, No JVD, Normal thyroid  NERVOUS SYSTEM:  Alert & Oriented X3, Good concentration; Motor Strength 5/5 B/L upper and lower extremities; DTRs 2+ intact and symmetric  CHEST/LUNG: Clear to percussion bilaterally; No rales, rhonchi, wheezing, or rubs  HEART: Regular rate and rhythm; No murmurs, rubs, or gallops  ABDOMEN: Soft, Nontender, Nondistended; Bowel sounds present  EXTREMITIES:  2+ Peripheral Pulses, No clubbing, cyanosis, or edema  LYMPH: No lymphadenopathy noted  SKIN: No rashes or lesions    Consultant(s) Notes Reviewed:  [x ] YES  [ ] NO  Care Discussed with Consultants/Other Providers [ x] YES  [ ] NO T(C): 36.6 (05-25-22 @ 22:23), Max: 36.9 (05-25-22 @ 08:04)  HR: 64 (05-25-22 @ 23:17) (64 - 70)  BP: 138/70 (05-25-22 @ 23:17) (138/70 - 142/55)  RR: 17 (05-25-22 @ 23:17) (16 - 17)  SpO2: 100% (05-25-22 @ 23:17) (94% - 100%)  Wt(kg): --Vital Signs Last 24 Hrs  T(C): 36.6 (25 May 2022 22:23), Max: 36.9 (25 May 2022 08:04)  T(F): 97.8 (25 May 2022 22:23), Max: 98.5 (25 May 2022 08:04)  HR: 64 (25 May 2022 23:17) (64 - 70)  BP: 138/70 (25 May 2022 23:17) (138/70 - 142/55)  BP(mean): --  RR: 17 (25 May 2022 23:17) (16 - 17)  SpO2: 100% (25 May 2022 23:17) (94% - 100%)    PHYSICAL EXAM:  General: Obese female resting comfortable in no acute distress.  Head: Normocephalic, atraumatic.    Eyes: PERRL.  EOMI.  No scleral icterus.    Mouth: Moist MM.  No oropharyngeal exudates.    Neck: Supple.  Full range of motion.   Heart: RRR.  Normal S1 and S2.    Lungs: Nonlabored breathing.  Good inspiratory effort.  CTAB.   Abdomen: Soft nontender nondistended + BS   Skin: Warm and dry.  No rashes.  Extremities: No cyanosis.  2+ peripheral pulses b/l.  Musculoskeletal: No joint deformities.  No spinal or paraspinal tenderness.  Neuro: A&Ox1 (self). No focal deficits    Consultant(s) Notes Reviewed:  [x ] YES  [ ] NO  Care Discussed with Consultants/Other Providers [ x] YES  [ ] NO T(C): 36.6 (05-25-22 @ 22:23), Max: 36.9 (05-25-22 @ 08:04)  HR: 64 (05-25-22 @ 23:17) (64 - 70)  BP: 138/70 (05-25-22 @ 23:17) (138/70 - 142/55)  RR: 17 (05-25-22 @ 23:17) (16 - 17)  SpO2: 100% (05-25-22 @ 23:17) (94% - 100%)  Wt(kg): --Vital Signs Last 24 Hrs  T(C): 36.6 (25 May 2022 22:23), Max: 36.9 (25 May 2022 08:04)  T(F): 97.8 (25 May 2022 22:23), Max: 98.5 (25 May 2022 08:04)  HR: 64 (25 May 2022 23:17) (64 - 70)  BP: 138/70 (25 May 2022 23:17) (138/70 - 142/55)  BP(mean): --  RR: 17 (25 May 2022 23:17) (16 - 17)  SpO2: 100% (25 May 2022 23:17) (94% - 100%)    PHYSICAL EXAM:  General: Obese female resting comfortable in no acute distress. Opens eyes to command. Nods yes and no.  Head: Normocephalic, atraumatic.    Eyes: PERRL.  EOMI.  No scleral icterus.    Mouth: Moist MM.  No oropharyngeal exudates.    Neck: Supple.  Full range of motion.   Heart: RRR.  Normal S1 and S2.    Lungs: Nonlabored breathing.  Good inspiratory effort.  CTAB.   Abdomen: Soft nontender nondistended + BS   Skin: Warm and dry.  No rashes.  Extremities: No cyanosis.  2+ peripheral pulses b/l.  Musculoskeletal: No joint deformities.  No spinal or paraspinal tenderness.  Neuro: A&Ox1 (self). No focal deficits    Consultant(s) Notes Reviewed:  [x ] YES  [ ] NO  Care Discussed with Consultants/Other Providers [ x] YES  [ ] NO T(C): 36.6 (05-25-22 @ 22:23), Max: 36.9 (05-25-22 @ 08:04)  HR: 64 (05-25-22 @ 23:17) (64 - 70)  BP: 138/70 (05-25-22 @ 23:17) (138/70 - 142/55)  RR: 17 (05-25-22 @ 23:17) (16 - 17)  SpO2: 100% (05-25-22 @ 23:17) (94% - 100%)  Wt(kg): --Vital Signs Last 24 Hrs  T(C): 36.6 (25 May 2022 22:23), Max: 36.9 (25 May 2022 08:04)  T(F): 97.8 (25 May 2022 22:23), Max: 98.5 (25 May 2022 08:04)  HR: 64 (25 May 2022 23:17) (64 - 70)  BP: 138/70 (25 May 2022 23:17) (138/70 - 142/55)  BP(mean): --  RR: 17 (25 May 2022 23:17) (16 - 17)  SpO2: 100% (25 May 2022 23:17) (94% - 100%)    PHYSICAL EXAM:  General: Obese female resting comfortable in no acute distress. Opens eyes to command. Nods yes and no.  Head: Normocephalic, atraumatic.    Eyes: PERRL.  EOMI.  No scleral icterus.    Mouth: Moist MM.  No oropharyngeal exudates.    Neck: Supple.  Full range of motion.   Heart: RRR.  Normal S1 and S2.    Lungs: Non-labored breathing.  Good inspiratory effort.  CTAB.   Abdomen: Soft nontender nondistended + BS   Skin: Warm and dry.  No rashes.  Extremities: No cyanosis.  2+ peripheral pulses b/l.  Musculoskeletal: No joint deformities.  No spinal or paraspinal tenderness.  Neuro: A&Ox1 (self). No focal deficits    Consultant(s) Notes Reviewed:  [x ] YES  [ ] NO  Care Discussed with Consultants/Other Providers [ x] YES  [ ] NO

## 2022-05-26 NOTE — PATIENT PROFILE ADULT - OVER THE PAST TWO WEEKS HAVE YOU FELT DOWN, DEPRESSED OR HOPELESS?
88 yr old man with PMH of htn, DMII, MI, afib, HF, and recent diagnosis of CLL, admitted for complicated UTI (growing proteus) and NADIA. 88 year old man with multiple medical problems admitted with urosepsis. He has a CD5/CD20 + lymphocytosis which is likely to be CLL, although MCL needs to be excluded. Overall performance status is poor. Seems improved since admission although renal function remains worse than baseline.     He has no organomegaly or adenopathy on exam, and I suspect that he has a low tumor burden unable to obtain information due to cognitive status

## 2022-05-26 NOTE — PROGRESS NOTE ADULT - PROBLEM SELECTOR PLAN 5
+ UTI dx on 05/17  - pt was Rx PO Bactrim on 05/17   - repeat UA and Urine cx ordered   - stop PO abx  - ensure optimal hydration - continue methimazole 5mg qd  - FT3 = 3.03 on 05/19/2022 (within acceptable range)

## 2022-05-26 NOTE — PROGRESS NOTE ADULT - PROBLEM SELECTOR PLAN 1
Presented with unresponsiveness, eye rolling and tongue movements  Although concern for recent nonconvulsive seizure event, there currently appears not objective evidence (VS changes, tonic/clonic movements, abnormal pupil changes, tongue biting); will hold off on loading with AED now until more workup is pursued in order to prevent worsening of her mental status.  In the setting of patient with multiple sclerosis  - neurology following, increased atorvastatin to 80 at their request, will get 24 hr EEG  - ammonia, CK, lactate, lipid studies all normal  - Will wait to order MRI until family member confirms continued AMS, at this point appears A&Ox1 (x2 with queueing) which is in line with her baseline documented at [facility]  - 24 hour EEG  - Neuro-checks and VS q4h  - aspiration, fall, seizure precautions    - If witness a generalized tonic (increased tone) / clonic (shaking) episode lasting >3 min or with vital sign changes, can consider ativan 1mg x1 IV. Please call neurology prior to administering.     - Please note: if patient has a convulsion, please document length of episode, specifically what patient was doing paying attention to eye opening vs closure, gaze deviation, shaking of extremities, tongue bite, urinary incontinence, any derangement of vital signs. Generalized motor seizures can have dilated and unreactive pupils, absent oculocephalic reflexes (no dolls eyes), and open eyelids (99% of cases). Head turning from sided to side, pelvic thrusting, bicycling movements, hand waving are NOT manifestations of generalized motor seizures. Presented with unresponsiveness, eye rolling and tongue movements, 10-15 second episode, of not DID NOT RECEIVE ECT on 5/25 confirmed by psychiatry team at [facility]  - Neurology following, increased atorvastatin to 80 at their request, will get 24 hr EEG  - Ammonia, CK, lactate, lipid studies all normal  - Will wait to order MRI until family member confirms continued AMS, at this point appears A&Ox1 (x2-3 with queueing) which is in line with her baseline or better than documented at [facility]  - 24 hour EEG Presented with unresponsiveness, eye rolling and tongue movements, 10-15 second episode, of not DID NOT RECEIVE ECT on 5/25 confirmed by psychiatry team at [facility]  - Neurology following, increased atorvastatin to 80 at their request, will get 24 hr EEG  - Ammonia, CK, lactate, lipid studies all normal  - Will wait to order MRI until family member confirms continued AMS, at this point appears A&Ox1 (x2-3 with queueing) which is in line with her baseline or better than documented at [facility]  - Per discussion with April, daughter and HCP of pt, she has had multiple episodes identical to this, has had numerous MRIs and EEGs without actionable or new findings, daughter is upset and confirmed that she should be contacted before transfer to medical hospital as she needs to give consent for hospitalization as they have MOLST form documenting Do Not Hospitalize  - 24 hour EEG

## 2022-05-26 NOTE — H&P ADULT - ASSESSMENT
73 y/o Obese F PMHx Dementia (A+Ox1-2), MDD (hx of SI), T2DM, HTN, HLD, MS, COPD (not on home O2)  presents from [facility] for unresponsiveness after receiving her first session of ECT today. Concern for nonconvulsive seizure event potentially precipitated by recent ECT therapy versus toxic/metabolic/infectious process causing encephalopathy. Low suspicion for stroke given nonlateralizing deficits and history above.  73 y/o Obese F PMHx Dementia (A+Ox1-2), MDD (hx of SI), T2DM, HTN, HLD, MS, COPD (not on home O2)  presents from [facility] for unresponsiveness after receiving her first session of ECT today. Concern for nonconvulsive seizure event potentially precipitated by recent ECT therapy versus toxic/metabolic/infectious process causing encephalopathy w/ low suspicion for stroke given nonlateralizing deficits.  [ x ]  Lab studies reviewed  [ x ]  Radiology reviewed  [ x ]  Old records reviewed    71 y/o Obese F PMHx Dementia (A+Ox1-2), MDD (hx of SI), T2DM, HTN, HLD, MS, COPD (not on home O2)  presents from [facility] for unresponsiveness after receiving her first session of ECT today. Concern for nonconvulsive seizure event potentially precipitated by recent ECT therapy versus toxic/metabolic/infectious process causing encephalopathy w/ low suspicion for stroke given nonlateralizing deficits.

## 2022-05-26 NOTE — BH CONSULTATION LIAISON ASSESSMENT NOTE - CURRENT MEDICATION
MEDICATIONS  (STANDING):  atorvastatin 80 milliGRAM(s) Oral at bedtime  cholecalciferol 2000 Unit(s) Oral daily  enoxaparin Injectable 40 milliGRAM(s) SubCutaneous every 24 hours  FLUoxetine 40 milliGRAM(s) Oral daily  lactated ringers. 1000 milliLiter(s) (100 mL/Hr) IV Continuous <Continuous>  lisinopril 20 milliGRAM(s) Oral daily  methimazole 5 milliGRAM(s) Oral daily  rOPINIRole 2 milliGRAM(s) Oral at bedtime    MEDICATIONS  (PRN):  acetaminophen     Tablet .. 650 milliGRAM(s) Oral every 6 hours PRN Temp greater or equal to 38C (100.4F), Mild Pain (1 - 3)

## 2022-05-27 LAB
ANION GAP SERPL CALC-SCNC: 8 MMOL/L — SIGNIFICANT CHANGE UP (ref 7–14)
BASOPHILS # BLD AUTO: 0 K/UL — SIGNIFICANT CHANGE UP (ref 0–0.2)
BASOPHILS NFR BLD AUTO: 0 % — SIGNIFICANT CHANGE UP (ref 0–2)
BUN SERPL-MCNC: 15 MG/DL — SIGNIFICANT CHANGE UP (ref 7–23)
CALCIUM SERPL-MCNC: 9.3 MG/DL — SIGNIFICANT CHANGE UP (ref 8.4–10.5)
CHLORIDE SERPL-SCNC: 103 MMOL/L — SIGNIFICANT CHANGE UP (ref 98–107)
CO2 SERPL-SCNC: 24 MMOL/L — SIGNIFICANT CHANGE UP (ref 22–31)
CREAT SERPL-MCNC: 0.67 MG/DL — SIGNIFICANT CHANGE UP (ref 0.5–1.3)
CULTURE RESULTS: NO GROWTH — SIGNIFICANT CHANGE UP
EGFR: 92 ML/MIN/1.73M2 — SIGNIFICANT CHANGE UP
EOSINOPHIL # BLD AUTO: 0.29 K/UL — SIGNIFICANT CHANGE UP (ref 0–0.5)
EOSINOPHIL NFR BLD AUTO: 4.3 % — SIGNIFICANT CHANGE UP (ref 0–6)
GLUCOSE SERPL-MCNC: 91 MG/DL — SIGNIFICANT CHANGE UP (ref 70–99)
HCT VFR BLD CALC: 34.6 % — SIGNIFICANT CHANGE UP (ref 34.5–45)
HGB BLD-MCNC: 11.4 G/DL — LOW (ref 11.5–15.5)
IANC: 4.58 K/UL — SIGNIFICANT CHANGE UP (ref 1.8–7.4)
IMM GRANULOCYTES NFR BLD AUTO: 0.4 % — SIGNIFICANT CHANGE UP (ref 0–1.5)
LYMPHOCYTES # BLD AUTO: 1.29 K/UL — SIGNIFICANT CHANGE UP (ref 1–3.3)
LYMPHOCYTES # BLD AUTO: 19.3 % — SIGNIFICANT CHANGE UP (ref 13–44)
MAGNESIUM SERPL-MCNC: 1.9 MG/DL — SIGNIFICANT CHANGE UP (ref 1.6–2.6)
MCHC RBC-ENTMCNC: 29.5 PG — SIGNIFICANT CHANGE UP (ref 27–34)
MCHC RBC-ENTMCNC: 32.9 GM/DL — SIGNIFICANT CHANGE UP (ref 32–36)
MCV RBC AUTO: 89.6 FL — SIGNIFICANT CHANGE UP (ref 80–100)
MONOCYTES # BLD AUTO: 0.5 K/UL — SIGNIFICANT CHANGE UP (ref 0–0.9)
MONOCYTES NFR BLD AUTO: 7.5 % — SIGNIFICANT CHANGE UP (ref 2–14)
NEUTROPHILS # BLD AUTO: 4.58 K/UL — SIGNIFICANT CHANGE UP (ref 1.8–7.4)
NEUTROPHILS NFR BLD AUTO: 68.5 % — SIGNIFICANT CHANGE UP (ref 43–77)
NRBC # BLD: 0 /100 WBCS — SIGNIFICANT CHANGE UP
NRBC # FLD: 0 K/UL — SIGNIFICANT CHANGE UP
PHOSPHATE SERPL-MCNC: 3.3 MG/DL — SIGNIFICANT CHANGE UP (ref 2.5–4.5)
PLATELET # BLD AUTO: 215 K/UL — SIGNIFICANT CHANGE UP (ref 150–400)
POTASSIUM SERPL-MCNC: 3.9 MMOL/L — SIGNIFICANT CHANGE UP (ref 3.5–5.3)
POTASSIUM SERPL-SCNC: 3.9 MMOL/L — SIGNIFICANT CHANGE UP (ref 3.5–5.3)
RBC # BLD: 3.86 M/UL — SIGNIFICANT CHANGE UP (ref 3.8–5.2)
RBC # FLD: 13.9 % — SIGNIFICANT CHANGE UP (ref 10.3–14.5)
SODIUM SERPL-SCNC: 135 MMOL/L — SIGNIFICANT CHANGE UP (ref 135–145)
SPECIMEN SOURCE: SIGNIFICANT CHANGE UP
WBC # BLD: 6.69 K/UL — SIGNIFICANT CHANGE UP (ref 3.8–10.5)
WBC # FLD AUTO: 6.69 K/UL — SIGNIFICANT CHANGE UP (ref 3.8–10.5)

## 2022-05-27 PROCEDURE — 95718 EEG PHYS/QHP 2-12 HR W/VEEG: CPT

## 2022-05-27 PROCEDURE — 99232 SBSQ HOSP IP/OBS MODERATE 35: CPT | Mod: GC

## 2022-05-27 PROCEDURE — 99233 SBSQ HOSP IP/OBS HIGH 50: CPT

## 2022-05-27 RX ADMIN — LISINOPRIL 20 MILLIGRAM(S): 2.5 TABLET ORAL at 06:16

## 2022-05-27 RX ADMIN — Medication 25 MILLIGRAM(S): at 06:15

## 2022-05-27 RX ADMIN — Medication 40 MILLIGRAM(S): at 13:16

## 2022-05-27 RX ADMIN — QUETIAPINE FUMARATE 12.5 MILLIGRAM(S): 200 TABLET, FILM COATED ORAL at 21:53

## 2022-05-27 RX ADMIN — Medication 2000 UNIT(S): at 13:16

## 2022-05-27 RX ADMIN — ENOXAPARIN SODIUM 40 MILLIGRAM(S): 100 INJECTION SUBCUTANEOUS at 06:16

## 2022-05-27 RX ADMIN — ROPINIROLE 2 MILLIGRAM(S): 8 TABLET, FILM COATED, EXTENDED RELEASE ORAL at 21:53

## 2022-05-27 RX ADMIN — ATORVASTATIN CALCIUM 80 MILLIGRAM(S): 80 TABLET, FILM COATED ORAL at 21:53

## 2022-05-27 NOTE — PROGRESS NOTE ADULT - ASSESSMENT
72 yF PMHx vascular Dementia (A+Ox1-2), MDD (hx of SI), HTN, HLD, MS, COPD (not on home O2)  presents from [facility] for unresponsiveness for 10-15 seconds. Admitted for concern for seizure event.

## 2022-05-27 NOTE — PROGRESS NOTE ADULT - PROBLEM SELECTOR PLAN 4
+ UTI dx on 05/17  - pt was Rx PO Bactrim on 05/17, uncertain how long this was continued, notes on both 5/21 and 5/24 state last day of abx  - Repeat UA without signs of infection  - No further treatment - continue methimazole 5mg qd  - FT3 = 3.03 on 05/19/2022 (within acceptable range)

## 2022-05-27 NOTE — PROGRESS NOTE ADULT - PROBLEM SELECTOR PLAN 2
- Severe episode of recurrent major depressive disorder, without psychotic features  - Patient with hx of MDD and anxiety, before being admitted was on prozac 40mg qd, trazodone 100mg qhs, and klonipin 0.5ms qd, since she has been at [facility] medication changes were as follows: Continued prozac, DC klonipin and trazodone for over-sedation, started Quetiapine 12.5mg qd and were planning for ECT  - As per notes patient tried cutting her wrists with butter knife on 5/12/22  - TSH wnl 0.46 on 5/18 with normal triiodothyronine at 3.03  - On vitamin D supplementation at home  - Continue Prozac 40 mg daily, quetiapine 12.5mg qhs

## 2022-05-27 NOTE — PROGRESS NOTE ADULT - SUBJECTIVE AND OBJECTIVE BOX
Ofe Curran MD  EM/IM PGY-1  Pager # [phone] or Teams  --------------------------------  ==============UNFINISHED NOTE==================    INTERVAL HPI/OVERNIGHT EVENTS: No acute events overnight, vitals remained stable. All scheduled medications given, No PRN medications required.     SUBJECTIVE: Patient seen and examined at bedside.     VITAL SIGNS:  T(C): 37.1 (22 @ 05:47), Max: 37.2 (22 @ 14:19)  HR: 65 (22 @ 05:47) (59 - 73)  BP: 145/73 (22 @ 05:47) (133/49 - 146/75)  RR: 18 (22 @ 05:47) (18 - 18)  SpO2: 99% (22 @ 05:47) (97% - 99%)      PHYSICAL EXAM:        MEDICATIONS:  MEDICATIONS  (STANDING):  atorvastatin 80 milliGRAM(s) Oral at bedtime  cholecalciferol 2000 Unit(s) Oral daily  enoxaparin Injectable 40 milliGRAM(s) SubCutaneous every 24 hours  FLUoxetine 40 milliGRAM(s) Oral daily  hydrochlorothiazide 25 milliGRAM(s) Oral daily  lactated ringers. 1000 milliLiter(s) (100 mL/Hr) IV Continuous <Continuous>  lisinopril 20 milliGRAM(s) Oral daily  methimazole 5 milliGRAM(s) Oral daily  QUEtiapine 12.5 milliGRAM(s) Oral at bedtime  rOPINIRole 2 milliGRAM(s) Oral at bedtime    MEDICATIONS  (PRN):  acetaminophen     Tablet .. 650 milliGRAM(s) Oral every 6 hours PRN Temp greater or equal to 38C (100.4F), Mild Pain (1 - 3)  LORazepam     Tablet 0.5 milliGRAM(s) Oral every 6 hours PRN Anxiety      LABS:                        11.9   7.23  )-----------( 223      ( 26 May 2022 01:37 )             37.4         138  |  103  |  24<H>  ----------------------------<  93  4.4   |  23  |  0.94    Ca    9.4      26 May 2022 01:37  Phos  3.6       Mg     2.00         TPro  6.5  /  Alb  3.9  /  TBili  0.6  /  DBili  x   /  AST  29  /  ALT  28  /  AlkPhos  57      PT/INR - ( 26 May 2022 01:37 )   PT: 13.6 sec;   INR: 1.17 ratio         PTT - ( 26 May 2022 01:37 )  PTT:30.9 sec  Urinalysis Basic - ( 26 May 2022 00:45 )    Color: Yellow / Appearance: Clear / S.027 / pH: x  Gluc: x / Ketone: Trace  / Bili: Negative / Urobili: 6 mg/dL   Blood: x / Protein: 30 mg/dL / Nitrite: Negative   Leuk Esterase: Negative / RBC: <5 /HPF / WBC 0-2 /HPF   Sq Epi: x / Non Sq Epi: 0-2 /HPF / Bacteria: Negative         Ofe Curran MD  EM/IM PGY-1  Pager # [phone] or Teams  --------------------------------    INTERVAL HPI/OVERNIGHT EVENTS: No acute events overnight, vitals remained stable. All scheduled medications given, No PRN medications required.     SUBJECTIVE: Patient seen and examined at bedside. Alert and orientedx3, denies any pain, shortness of breath, remains confused about why she is here but denies active suicidal ideation. Is not very hungry but will try to eat today.    VITAL SIGNS:  T(C): 37.1 (22 @ 05:47), Max: 37.2 (22 @ 14:19)  HR: 65 (22 @ 05:47) (59 - 73)  BP: 145/73 (22 @ 05:47) (133/49 - 146/75)  RR: 18 (22 @ 05:47) (18 - 18)  SpO2: 99% (22 @ 05:47) (97% - 99%)      PHYSICAL EXAM:    GENERAL: Sitting comfortably in bed in no acute distress  NEURO: Alert and Oriented to person, place ([facility] but not the name of the hospital), year, not situation. Pupils symmetric, No ptosis. No facial asymmetry or dysarthria, no tremor noted.  HEENT: No conjunctival injection or scleral icterus.   CARD: Normal rate and regular rhythm, no murmurs and no gallops appreciated.  RESP: Clear to auscultation bilaterally, No wheezes, rales or rhonchi. Good respiratory effort.  ABD: Bowel sounds active. Nondistended, Soft and nontender to palpation in all quadrants, no guarding, no rigidity. No masses appreciated.  EXT: No pedal edema. 2+DP pulses bilaterally. 4/5 strength leg raise bilaterally      MEDICATIONS:  MEDICATIONS  (STANDING):  atorvastatin 80 milliGRAM(s) Oral at bedtime  cholecalciferol 2000 Unit(s) Oral daily  enoxaparin Injectable 40 milliGRAM(s) SubCutaneous every 24 hours  FLUoxetine 40 milliGRAM(s) Oral daily  hydrochlorothiazide 25 milliGRAM(s) Oral daily  lactated ringers. 1000 milliLiter(s) (100 mL/Hr) IV Continuous <Continuous>  lisinopril 20 milliGRAM(s) Oral daily  methimazole 5 milliGRAM(s) Oral daily  QUEtiapine 12.5 milliGRAM(s) Oral at bedtime  rOPINIRole 2 milliGRAM(s) Oral at bedtime    MEDICATIONS  (PRN):  acetaminophen     Tablet .. 650 milliGRAM(s) Oral every 6 hours PRN Temp greater or equal to 38C (100.4F), Mild Pain (1 - 3)  LORazepam     Tablet 0.5 milliGRAM(s) Oral every 6 hours PRN Anxiety      LABS:                        11.9   7.23  )-----------( 223      ( 26 May 2022 01:37 )             37.4         138  |  103  |  24<H>  ----------------------------<  93  4.4   |  23  |  0.94    Ca    9.4      26 May 2022 01:37  Phos  3.6       Mg     2.00         TPro  6.5  /  Alb  3.9  /  TBili  0.6  /  DBili  x   /  AST  29  /  ALT  28  /  AlkPhos  57      PT/INR - ( 26 May 2022 01:37 )   PT: 13.6 sec;   INR: 1.17 ratio         PTT - ( 26 May 2022 01:37 )  PTT:30.9 sec  Urinalysis Basic - ( 26 May 2022 00:45 )    Color: Yellow / Appearance: Clear / S.027 / pH: x  Gluc: x / Ketone: Trace  / Bili: Negative / Urobili: 6 mg/dL   Blood: x / Protein: 30 mg/dL / Nitrite: Negative   Leuk Esterase: Negative / RBC: <5 /HPF / WBC 0-2 /HPF   Sq Epi: x / Non Sq Epi: 0-2 /HPF / Bacteria: Negative

## 2022-05-27 NOTE — BH CONSULTATION LIAISON PROGRESS NOTE - NSBHASSESSMENTFT_PSY_ALL_CORE
71 y/o Obese F PMHx Dementia (A+Ox1-2), MDD (hx of SI), T2DM, HTN, HLD, MS, COPD (not on home O2)  presented from [facility] for unresponsiveness. Psych consulted for ongoing follow-up. Pt had de paco period of unresponsiveness at [facility] that warrants medical/neuro evaluations. ECT was being considered but never actually administered. Differential includes medical etiology vs behavioral (psychogenic seizure). Medically cleared after negative evaluations (head imaging, EEG).    Plan:   - Continue usual psychotropics: Prozac 20mg daily, Requip 2mg HS, Seroquel 12.5mg HS, and Ativan 0.5mg PO Q 6PRN anxiety  - Maintain enhanced supervision given impulsive behavior and inability to contract for safety; recommend 7S/[facility] if possible  - Will continue to follow  - Dispo: likely return to [facility] when medically cleared. Possible ECT either here or there.

## 2022-05-27 NOTE — PROGRESS NOTE ADULT - PROBLEM SELECTOR PLAN 5
- continue methimazole 5mg qd  - FT3 = 3.03 on 05/19/2022 (within acceptable range) - hx of MS  - wheelchair bound, incontinent   - pt receives Plegridy q 2 weeks - next dose 05/30   - f/u neuro outpatient for same    Daughter said patient's  will bring in medication on 5/30 as it needs to stay refrigerated until administration. Family aware next dose due on 5/30.

## 2022-05-27 NOTE — EEG REPORT - NS EEG TEXT BOX
DIXON WILBURN [id number] 72y (1950)F  Admitting MD: Dr. J Luis Adams    Study Date: 05-26 09:27-08:00 05-27-22 interrupted   x22h  --------------------------------------------------------------------------------------------------  History:  CC/ HPI Patient is a 72y old  Female who presents with a chief complaint of unresponsiveness (27 May 2022 06:28)    atorvastatin 80 milliGRAM(s) Oral at bedtime  cholecalciferol 2000 Unit(s) Oral daily  enoxaparin Injectable 40 milliGRAM(s) SubCutaneous every 24 hours  FLUoxetine 40 milliGRAM(s) Oral daily  hydrochlorothiazide 25 milliGRAM(s) Oral daily  lactated ringers. 1000 milliLiter(s) IV Continuous <Continuous>  lisinopril 20 milliGRAM(s) Oral daily  methimazole 5 milliGRAM(s) Oral daily  QUEtiapine 12.5 milliGRAM(s) Oral at bedtime  rOPINIRole 2 milliGRAM(s) Oral at bedtime    --------------------------------------------------------------------------------------------------  Study Interpretation:    [[[Abbreviation Key:  PDR=alpha rhythm/posterior dominant rhythm. A-P=anterior posterior gradient.  Amplitude: ‘very low’:<20; ‘low’:20-50; ‘medium’:; ‘high’:>200uV.  Persistence for periodic/rhythmic patterns (% of epoch) ‘rare’:<1%; ‘occasional’:1-10%; ‘frequent’:10-50%; ‘abundant’:50-90%; ‘continuous’:>90%.  Persistence for sporadic discharges: ‘rare’:<1/hr; ‘occasional’:1/min-1/hr; ‘frequent’:>1/min; ‘abundant’:>1/10 sec.  GRDA=generalized rhythmic delta activity; FIRDA=frontal intermittent GRDA; LRDA=lateralized rhythmic delta activity; TIRDA=temporal intermittent rhythmic delta activity;  LPD=PLED=lateralized periodic discharges; GPD=generalized periodic discharges; BiPDs=BiPLEDs=bilateral independent periodic epileptiform discharges; SIRPID=stimulus induced rhythmic, periodic, or ictal appearing discharges; BIRDs=brief potentially ictal rhythmic discharges >4 Hz, lasting .5-10s; PFA (paroxysmal bursts >13 Hz or =8 Hz).  Modifiers: +F=with fast component; +S=with spike component; +R=with rhythmic component.  S-B=burst suppression pattern.  Max=maximal. N1-drowsy; N2-stage II sleep; N3-slow wave sleep. SSS/BETS=small sharp spikes/benign epileptiform transients of sleep. HV=hyperventilation; PS=photic stimulation]]]    FINDINGS:  The background was continuous, spontaneously variable and reactive.  During wakefulness, the posteriorly dominant rhythm consisted of symmetric, well modulated 8.5-9Hz activity, with an amplitude to 40 uV, that attenuated to eye opening.  Low amplitude central beta was noted in wakefulness.    Background Slowing:  Generalized slowing: none was present.  Focal slowing: none was present.    Sleep Background:  Drowsiness was characterized by fragmentation, attenuation, and slowing of the background activity.    Sleep was characterized by the presence of vertex waves, symmetric spindles, and K-complexes.    Epileptiform Activity:   No interictal epileptiform discharges were present.    Events:  No clinical events were recorded.  No seizures were recorded.    Activation Procedures:   -Hyperventilation was not performed.    -Photic stimulation was not performed.    Artifacts:  Intermittent myogenic and external motion artifacts were noted.    ECG:  The heart rate on single channel ECG at baseline was predominantly near BPM = 60-66  -----------------------------------------------------------------------------------------------------    EEG Classification / Summary:  Normal EEG study, awake / drowsy / asleep for age  -  -----------------------------------------------------------------------------------------------------    Clinical Impression:  There were no epileptiform abnormalities recorded.    No seizures x 1 day(s)    In absence of additional clinical concerns, recommend consideration for discontinuation of current EEG study with reconnection in future if warranted.    General recommendations for CEEG/LTM duration:  1 Day recording if patient awake and no epileptiform abnormalities recorded.  2 Day recording if patient comatose and no epileptiform abnormalities recorded.  2-3 Day recording if patient comatose and epileptiform abnormalities recorded, with no seizures recorded.  Discontinuation of recording if patient with epileptiform abnormalities or seizures initially on recording, and no subsequent seizures recorded x 1-2 Days.    -------------------------------------------------------------------------------------------------------  [facility] EEG Reading Room Ph#: [phone]  Epilepsy Answering Service after 5PM and before 8:30AM: Ph#: [phone]    Mehdi Dumont M.D.   of Neurology, [facility]ile[facility] [place]

## 2022-05-27 NOTE — PROGRESS NOTE ADULT - PROBLEM SELECTOR PLAN 8
DVT ppx: Lovenox SQ 40mg QD  GOC - DNR/DNI/DNH- MOLST in chart - comfort measures only  Dispo: pending PT consult, likely back to [place]  Diet: Soft and bite sized

## 2022-05-27 NOTE — PROGRESS NOTE ADULT - PROBLEM SELECTOR PLAN 1
Presented with unresponsiveness, eye rolling and tongue movements, 10-15 second episode, of note, pt DID NOT RECEIVE ECT on 5/25 confirmed by psychiatry team at [facility]  - Neurology following, increased atorvastatin to 80 at their request, will get 24 hr EEG  - Ammonia, CK, lactate, lipid studies all normal  - Per discussion with April, daughter and HCP of pt, she has had multiple episodes identical to this, has had numerous MRIs and EEGs without actionable or new findings, daughter is upset and confirmed that she should be contacted before transfer to medical hospital as she needs to give consent for hospitalization as they have MOLST form documenting Do Not Hospitalize  - 24 hour EEG, prelim read no epileptiform activity  - hold off on MRI per pt daughter/HCP Presented with unresponsiveness, eye rolling and tongue movements, 10-15 second episode, of note, pt DID NOT RECEIVE ECT on 5/25 confirmed by psychiatry team at [facility]  - Neurology following, increased atorvastatin to 80 at their request, will get 24 hr EEG  - Ammonia, CK, lactate, lipid studies all normal  - Per discussion with April, daughter and HCP of pt, she has had multiple episodes identical to this, has had numerous MRIs and EEGs without actionable or new findings, daughter is upset and confirmed that she should be contacted before transfer to medical hospital as she needs to give consent for hospitalization as they have MOLST form documenting Do Not Hospitalize  - 24 hour EEG on 5/26-5/27 Normal  - hold off on MRI per pt daughter/HCP  - Pending discharge to [facility]

## 2022-05-27 NOTE — PROGRESS NOTE ADULT - ATTENDING COMMENTS
72W with PMH MDD with hx of SI and prior attempts, ?dementia, T2DM, HTN, HLD, MS, COPD who is presenting from [facility] with unresponsive episode PRIOR to her first session of ECT. Per daughter she had been worked up multiple times for identical episodes without being able to identify organic cause.     # Unresponsive episode - per daughter she has had multiple MRIs, EEGs, and blood tests not being able to identify organic cause, she was in [facility] to start ECT as this was thought to be episodes of catatonia. EEG here again showing no epileptiform patterns. No need for MRI. 72W with PMH MDD with hx of SI and prior attempts, ?dementia, T2DM, HTN, HLD, MS, COPD who is presenting from [facility] with unresponsive episode PRIOR to her first session of ECT. Per daughter she had been worked up multiple times for identical episodes without being able to identify organic cause.     # Unresponsive episode - per daughter she has had multiple MRIs, EEGs, and blood tests not being able to identify organic cause, she was in [facility] to start ECT as this was thought to be episodes of catatonia. EEG here again showing no epileptiform patterns. No need for MRI.    Plan discussed with daughter at bedside today 5/27

## 2022-05-27 NOTE — PROGRESS NOTE ADULT - PROBLEM SELECTOR PLAN 3
- CT head 5/18 and 5/25 with Severe chronic microvascular ischemic changes.  - At bedside patient is A+Ox1-2, awake and alert and follows commands, appears to be at baseline  - UA negative, CXR neg, doesn't appear different from baseline documented at [facility] inpt notes

## 2022-05-27 NOTE — PROGRESS NOTE ADULT - PROBLEM SELECTOR PLAN 7
- Continue home regimen HCTZ 25mg QD and lisinopril 20mg QD DVT ppx: Lovenox SQ 40mg QD  GOC - DNR/DNI/DNH- MOLST in chart - comfort measures only  Dispo: pending PT consult, likely back to [facility]  Diet: Soft and bite sized

## 2022-05-28 ENCOUNTER — TRANSCRIPTION ENCOUNTER (OUTPATIENT)
Age: 72
End: 2022-05-28

## 2022-05-28 PROCEDURE — 99232 SBSQ HOSP IP/OBS MODERATE 35: CPT | Mod: GC

## 2022-05-28 RX ORDER — ATORVASTATIN CALCIUM 80 MG/1
1 TABLET, FILM COATED ORAL
Qty: 0 | Refills: 0 | DISCHARGE

## 2022-05-28 RX ORDER — ATORVASTATIN CALCIUM 80 MG/1
1 TABLET, FILM COATED ORAL
Qty: 0 | Refills: 0 | DISCHARGE
Start: 2022-05-28

## 2022-05-28 RX ORDER — DIPHENHYDRAMINE HCL 50 MG
25 CAPSULE ORAL EVERY 4 HOURS
Refills: 0 | Status: DISCONTINUED | OUTPATIENT
Start: 2022-05-28 | End: 2022-06-18

## 2022-05-28 RX ADMIN — Medication 25 MILLIGRAM(S): at 00:44

## 2022-05-28 RX ADMIN — ROPINIROLE 2 MILLIGRAM(S): 8 TABLET, FILM COATED, EXTENDED RELEASE ORAL at 22:23

## 2022-05-28 RX ADMIN — LISINOPRIL 20 MILLIGRAM(S): 2.5 TABLET ORAL at 05:28

## 2022-05-28 RX ADMIN — Medication 2000 UNIT(S): at 12:09

## 2022-05-28 RX ADMIN — QUETIAPINE FUMARATE 12.5 MILLIGRAM(S): 200 TABLET, FILM COATED ORAL at 22:23

## 2022-05-28 RX ADMIN — Medication 40 MILLIGRAM(S): at 12:10

## 2022-05-28 RX ADMIN — ENOXAPARIN SODIUM 40 MILLIGRAM(S): 100 INJECTION SUBCUTANEOUS at 05:28

## 2022-05-28 RX ADMIN — Medication 25 MILLIGRAM(S): at 05:28

## 2022-05-28 RX ADMIN — ATORVASTATIN CALCIUM 80 MILLIGRAM(S): 80 TABLET, FILM COATED ORAL at 22:23

## 2022-05-28 NOTE — PROGRESS NOTE ADULT - PROBLEM SELECTOR PLAN 3
- CT head 5/18 and 5/25 with Severe chronic microvascular ischemic changes.  - At bedside patient is A&Ox2-3, awake and alert and follows commands, appears to be at baseline  - UA negative, CXR neg, doesn't appear different from baseline documented at [facility] inpt notes

## 2022-05-28 NOTE — PROGRESS NOTE ADULT - PROBLEM SELECTOR PLAN 5
- hx of MS  - wheelchair bound but can transfer and walk a few steps with walker, incontinent   - pt receives Plegridy q 2 weeks - next dose 05/30   - f/u neuro outpatient for same    Daughter said patient's  will bring in medication on 5/30 as it needs to stay refrigerated until administration. Family aware next dose due on 5/30.

## 2022-05-28 NOTE — PROGRESS NOTE ADULT - ATTENDING COMMENTS
72W with PMH MDD with hx of SI and prior attempts, ?dementia, T2DM, HTN, HLD, MS, COPD who is presenting from [facility] with unresponsive episode PRIOR to her first session of ECT. Per daughter she had been worked up multiple times for identical episodes without being able to identify organic cause.   Pt is a/ox2-3, awake and alert    # Unresponsive episode - per daughter she has had multiple MRIs, EEGs, and blood tests not being able to identify organic cause, she was in [facility] to start ECT as this was thought to be episodes of catatonia. EEG here again showing no epileptiform patterns. No need for MRI.  DC plan to [facility] pending bed likely Tue  DNR

## 2022-05-28 NOTE — EEG REPORT - NS EEG TEXT BOX
DIXON WILBURN [id number]-[id number] 72y (1950)F  Admitting MD: Dr. J Luis Adams    Study Date: 05-27-22 0800-1324 05-27-22   x5h24m  --------------------------------------------------------------------------------------------------  History:  CC/ HPI Patient is a 72y old  Female who presents with a chief complaint of unresponsiveness (27 May 2022 06:28)    atorvastatin 80 milliGRAM(s) Oral at bedtime  cholecalciferol 2000 Unit(s) Oral daily  enoxaparin Injectable 40 milliGRAM(s) SubCutaneous every 24 hours  FLUoxetine 40 milliGRAM(s) Oral daily  hydrochlorothiazide 25 milliGRAM(s) Oral daily  lactated ringers. 1000 milliLiter(s) IV Continuous <Continuous>  lisinopril 20 milliGRAM(s) Oral daily  methimazole 5 milliGRAM(s) Oral daily  QUEtiapine 12.5 milliGRAM(s) Oral at bedtime  rOPINIRole 2 milliGRAM(s) Oral at bedtime    --------------------------------------------------------------------------------------------------  Study Interpretation:    [[[Abbreviation Key:  PDR=alpha rhythm/posterior dominant rhythm. A-P=anterior posterior gradient.  Amplitude: ‘very low’:<20; ‘low’:20-50; ‘medium’:; ‘high’:>200uV.  Persistence for periodic/rhythmic patterns (% of epoch) ‘rare’:<1%; ‘occasional’:1-10%; ‘frequent’:10-50%; ‘abundant’:50-90%; ‘continuous’:>90%.  Persistence for sporadic discharges: ‘rare’:<1/hr; ‘occasional’:1/min-1/hr; ‘frequent’:>1/min; ‘abundant’:>1/10 sec.  GRDA=generalized rhythmic delta activity; FIRDA=frontal intermittent GRDA; LRDA=lateralized rhythmic delta activity; TIRDA=temporal intermittent rhythmic delta activity;  LPD=PLED=lateralized periodic discharges; GPD=generalized periodic discharges; BiPDs=BiPLEDs=bilateral independent periodic epileptiform discharges; SIRPID=stimulus induced rhythmic, periodic, or ictal appearing discharges; BIRDs=brief potentially ictal rhythmic discharges >4 Hz, lasting .5-10s; PFA (paroxysmal bursts >13 Hz or =8 Hz).  Modifiers: +F=with fast component; +S=with spike component; +R=with rhythmic component.  S-B=burst suppression pattern.  Max=maximal. N1-drowsy; N2-stage II sleep; N3-slow wave sleep. SSS/BETS=small sharp spikes/benign epileptiform transients of sleep. HV=hyperventilation; PS=photic stimulation]]]    FINDINGS:  The background was continuous, spontaneously variable and reactive.  During wakefulness, the posteriorly dominant rhythm consisted of symmetric, well modulated 8.5-9Hz activity, with an amplitude to 40 uV, that attenuated to eye opening.  Low amplitude central beta was noted in wakefulness.    Background Slowing:  Generalized slowing: none was present.  Focal slowing: none was present.    Sleep Background:  Drowsiness was characterized by fragmentation, attenuation, and slowing of the background activity.    Sleep was characterized by the presence of vertex waves, symmetric spindles, and K-complexes.    Epileptiform Activity:   No interictal epileptiform discharges were present.    Events:  No clinical events were recorded.  No seizures were recorded.    Activation Procedures:   -Hyperventilation was not performed.    -Photic stimulation was not performed.    Artifacts:  Intermittent myogenic and external motion artifacts were noted.    ECG:  The heart rate on single channel ECG at baseline was predominantly near BPM = 60-66  -----------------------------------------------------------------------------------------------------    EEG Classification / Summary:  Normal EEG study, awake / drowsy / asleep for age    -----------------------------------------------------------------------------------------------------    Clinical Impression:  There were no epileptiform abnormalities recorded.    No seizures x 1 day(s)    In absence of additional clinical concerns, recommend consideration for discontinuation of current EEG study with reconnection in future if warranted.    General recommendations for CEEG/LTM duration:  1 Day recording if patient awake and no epileptiform abnormalities recorded.  2 Day recording if patient comatose and no epileptiform abnormalities recorded.  2-3 Day recording if patient comatose and epileptiform abnormalities recorded, with no seizures recorded.  Discontinuation of recording if patient with epileptiform abnormalities or seizures initially on recording, and no subsequent seizures recorded x 1-2 Days.    -------------------------------------------------------------------------------------------------------  Preliminary Fellow Report, final report pending attending review    Bakari Infante MD  Epilepsy Fellow    Reading Room: [phone]  On Call Service After Hours: [phone]  DIXON WILBURN [id number]-[id number] 72y (1950)F  Admitting MD: Dr. J Luis Adams    Study Date: 05-27-22 0800-1324 05-27-22   x5h24m  --------------------------------------------------------------------------------------------------  History:  CC/ HPI Patient is a 72y old  Female who presents with a chief complaint of unresponsiveness (27 May 2022 06:28)    atorvastatin 80 milliGRAM(s) Oral at bedtime  cholecalciferol 2000 Unit(s) Oral daily  enoxaparin Injectable 40 milliGRAM(s) SubCutaneous every 24 hours  FLUoxetine 40 milliGRAM(s) Oral daily  hydrochlorothiazide 25 milliGRAM(s) Oral daily  lactated ringers. 1000 milliLiter(s) IV Continuous <Continuous>  lisinopril 20 milliGRAM(s) Oral daily  methimazole 5 milliGRAM(s) Oral daily  QUEtiapine 12.5 milliGRAM(s) Oral at bedtime  rOPINIRole 2 milliGRAM(s) Oral at bedtime    --------------------------------------------------------------------------------------------------  Study Interpretation:    [[[Abbreviation Key:  PDR=alpha rhythm/posterior dominant rhythm. A-P=anterior posterior gradient.  Amplitude: ‘very low’:<20; ‘low’:20-50; ‘medium’:; ‘high’:>200uV.  Persistence for periodic/rhythmic patterns (% of epoch) ‘rare’:<1%; ‘occasional’:1-10%; ‘frequent’:10-50%; ‘abundant’:50-90%; ‘continuous’:>90%.  Persistence for sporadic discharges: ‘rare’:<1/hr; ‘occasional’:1/min-1/hr; ‘frequent’:>1/min; ‘abundant’:>1/10 sec.  GRDA=generalized rhythmic delta activity; FIRDA=frontal intermittent GRDA; LRDA=lateralized rhythmic delta activity; TIRDA=temporal intermittent rhythmic delta activity;  LPD=PLED=lateralized periodic discharges; GPD=generalized periodic discharges; BiPDs=BiPLEDs=bilateral independent periodic epileptiform discharges; SIRPID=stimulus induced rhythmic, periodic, or ictal appearing discharges; BIRDs=brief potentially ictal rhythmic discharges >4 Hz, lasting .5-10s; PFA (paroxysmal bursts >13 Hz or =8 Hz).  Modifiers: +F=with fast component; +S=with spike component; +R=with rhythmic component.  S-B=burst suppression pattern.  Max=maximal. N1-drowsy; N2-stage II sleep; N3-slow wave sleep. SSS/BETS=small sharp spikes/benign epileptiform transients of sleep. HV=hyperventilation; PS=photic stimulation]]]    FINDINGS:  The background was continuous, spontaneously variable and reactive.  During wakefulness, the posteriorly dominant rhythm consisted of symmetric, well modulated 8.5-9Hz activity, with an amplitude to 40 uV, that attenuated to eye opening.  Low amplitude central beta was noted in wakefulness.    Background Slowing:  Generalized slowing: none was present.  Focal slowing: none was present.    Sleep Background:  Drowsiness was characterized by fragmentation, attenuation, and slowing of the background activity.    Sleep was characterized by the presence of vertex waves, symmetric spindles, and K-complexes.    Epileptiform Activity:   No interictal epileptiform discharges were present.    Events:  No clinical events were recorded.  No seizures were recorded.    Activation Procedures:   -Hyperventilation was not performed.    -Photic stimulation was not performed.    Artifacts:  Intermittent myogenic and external motion artifacts were noted.    ECG:  The heart rate on single channel ECG at baseline was predominantly near BPM = 60-66  -----------------------------------------------------------------------------------------------------    EEG Classification / Summary:  Normal EEG study, awake / drowsy / asleep for age    -----------------------------------------------------------------------------------------------------    Clinical Impression:  There were no epileptiform abnormalities recorded.    No seizures x 1 day(s)    In absence of additional clinical concerns, recommend consideration for discontinuation of current EEG study with reconnection in future if warranted.    General recommendations for CEEG/LTM duration:  1 Day recording if patient awake and no epileptiform abnormalities recorded.  2 Day recording if patient comatose and no epileptiform abnormalities recorded.  2-3 Day recording if patient comatose and epileptiform abnormalities recorded, with no seizures recorded.  Discontinuation of recording if patient with epileptiform abnormalities or seizures initially on recording, and no subsequent seizures recorded x 1-2 Days.    -------------------------------------------------------------------------------------------------------    Bakari Infante MD  Epilepsy Fellow    Smith Blanchard MD PhD  Director, Epilepsy Division, [facility] EEG Reading Room Ph#: [phone]  Epilepsy Answering Service after 5PM and before 8:30AM: Ph#: [phone]

## 2022-05-28 NOTE — PROGRESS NOTE ADULT - PROBLEM SELECTOR PLAN 2
- Severe episode of recurrent major depressive disorder, without psychotic features was the reason for her admission to [facility]  - Patient with hx of MDD and anxiety, before being admitted was on prozac 40mg qd, trazodone 100mg qhs, and klonipin 0.5ms qd, since she has been at [facility] medication changes were as follows: Continued prozac, DC klonipin and trazodone for over-sedation, started Quetiapine 12.5mg qd and were planning for ECT  - As per notes patient tried cutting her wrists with butter knife on 5/12/22  - TSH wnl 0.46 on 5/18 with normal triiodothyronine at 3.03  - On vitamin D supplementation at home  - Continue Prozac 40 mg daily, quetiapine 12.5mg qhs - Severe episode of recurrent major depressive disorder, without psychotic features was the reason for her admission to [facility]  - Patient with hx of MDD and anxiety, before being admitted was on prozac 40mg qd, trazodone 100mg qhs, and klonipin 0.5ms qd, since she has been at [facility] medication changes were as follows: Continued prozac, DC klonipin and trazodone for over-sedation, started Quetiapine 12.5mg qd and were planning for ECT  - As per notes patient tried cutting her wrists with butter knife on 5/12/22  - TSH wnl 0.46 on 5/18 with normal triiodothyronine at 3.03  - On vitamin D supplementation at home  - Continue Prozac 40 mg daily, Quetiapine 12.5mg qhs

## 2022-05-28 NOTE — DISCHARGE NOTE PROVIDER - PROVIDER TOKENS
FREE:[LAST:[Isai],FIRST:[Leola],PHONE:[phone]],FAX:[(   )    -],ADDRESS:[[address] [place] [address]],FOLLOWUP:[2 weeks],ESTABLISHEDPATIENT:[T]]

## 2022-05-28 NOTE — DISCHARGE NOTE PROVIDER - DETAILS OF MALNUTRITION DIAGNOSIS/DIAGNOSES
This patient has been assessed with a concern for Malnutrition and was treated during this hospitalization for the following Nutrition diagnosis/diagnoses:     -  06/02/2022: Moderate protein-calorie malnutrition

## 2022-05-28 NOTE — DISCHARGE NOTE PROVIDER - NSDCFUADDAPPT_GEN_ALL_CORE_FT
Your next ECT appt is scheduled for Wednesday, June 22 @ 8AM. As discussed, you will need to be NPO the night before and obtain a COVID test within 72 hours of each treatment. You will need to be accompanied by responsible adult with each treatment. Please note the location and contact information as below.     [phone] [place] [address], ECT [address], [facility] [place] [address] [phone]     You received Plegridy every 2 weeks on 5/30, 6/13. Your next dose is due on 06/27

## 2022-05-28 NOTE — PROGRESS NOTE ADULT - PROBLEM SELECTOR PLAN 7
DVT ppx: Lovenox SQ 40mg QD  GOC - DNR/DNI/DNH- MOLST in chart - comfort measures only  Dispo: Pt recommends rehab, but family would like transfer back to [facility]  Diet: Regular

## 2022-05-28 NOTE — DISCHARGE NOTE PROVIDER - NSDCMRMEDTOKEN_GEN_ALL_CORE_FT
albuterol 90 mcg/inh inhalation aerosol with adapter: 2 puff(s) inhaled 2 times a day, As Needed  atorvastatin 80 mg oral tablet: 1 tab(s) orally once a day (at bedtime)  cholecalciferol oral tablet: 2000 unit(s) orally once a day  FLUoxetine 40 mg oral capsule: 1 cap(s) orally once a day  hydroCHLOROthiazide 25 mg oral tablet: 1 tab(s) orally once a day  lisinopril 20 mg oral tablet: 1 tab(s) orally once a day  loratadine 10 mg oral tablet: 1 tab(s) orally once a day  methIMAzole 5 mg oral tablet: 1 tab(s) orally once a day  Plegridy Pen 125 mcg/0.5 mL subcutaneous solution: 1 dose(s) subcutaneous every 2 weeks - next dose 05/30  QUEtiapine 25 mg oral tablet: 0.5 tab(s) orally once a day (at bedtime)  rOPINIRole 2 mg oral tablet: 1 tab(s) orally once a day (at bedtime)

## 2022-05-28 NOTE — DISCHARGE NOTE PROVIDER - CARE PROVIDER_API CALL
Leola Alex  [address] [place] [address]  Phone: [phone]  Fax: (   )    -  Established Patient  Follow Up Time: 2 weeks

## 2022-05-28 NOTE — DISCHARGE NOTE PROVIDER - HOSPITAL COURSE
72 yF PMHx vascular Dementia (A+Ox1-2), MDD (hx of SI), HTN, HLD, MS on Plegridy (baseline able to walk briefly with walker, transfer, otherwise in wheelchair), COPD (not on home O2)  presents from [facility] for unresponsiveness for 10-15 seconds associated with eye gaze deviation and tongue wagging. Was originally admitted to [facility] on 5/19 for worsening depression and suicidal ideation including recent episode of cutting. Pt admitted for seizure work-up.    Hospital Course: Spoke with pt daughter and HCP April( [phone]), she has had multiple identical episodes in the last year, has had 2 negative MRIs and additional EEGs in the last 6 months. She did not want patient hospitalized as she has active MOLST stating DNR/DNI/DNH, unfortunately she was not contacted before transfer to [facility] due to inaccurate phone number in chart. She deferred another MRI, but EEG was completed from 5/26-5/27 and was negative for epileptiform activity. Pt has had 2 recent CT scans showing stable severe chronic microvascular  ischemic disease. Pt was then transferred back to [facility].    72 yF PMHx vascular Dementia (A+Ox1-2), MDD (hx of SI), HTN, HLD, MS on Plegridy (baseline able to walk briefly with walker, transfer, otherwise in wheelchair), COPD (not on home O2)  presents from [facility] for unresponsiveness for 10-15 seconds associated with eye gaze deviation and tongue wagging. Was originally admitted to [facility] on 5/19 for worsening depression and suicidal ideation including recent episode of cutting. Pt admitted for seizure work-up.    Hospital Course: Spoke with pt daughter and HCP April( [phone]), she has had multiple identical episodes in the last year, has had 2 negative MRIs and additional EEGs in the last 6 months. She did not want patient hospitalized as she has active MOLST stating DNR/DNI/DNH, unfortunately she was not contacted before transfer to [facility] due to inaccurate phone number in chart. She deferred another MRI, but EEG was completed from 5/26-5/27 and was negative for epileptiform activity. Pt has had 2 recent CT scans showing stable severe chronic microvascular  ischemic disease. For the patient's MS, she will be due for her Plegridy on 6/13, last dose received was on 5/30. At this time the patient is medically cleared to be transferred back to [facility].    72 yF PMHx vascular Dementia (A+Ox1-2), MDD (hx of SI), HTN, HLD, MS on Plegridy (baseline able to walk briefly with walker, transfer, otherwise in wheelchair), COPD (not on home O2)  presents from [facility] for unresponsiveness for 10-15 seconds associated with eye gaze deviation and tongue wagging. Was originally admitted to [facility] on 5/19 for worsening depression and suicidal ideation including recent episode of cutting. Pt admitted for seizure work-up.    Hospital Course: Spoke with pt daughter and HCP April( [phone]), she has had multiple identical episodes in the last year, has had 2 negative MRIs and additional EEGs in the last 6 months. She did not want patient hospitalized as she has active MOLST stating DNR/DNI/DNH, unfortunately she was not contacted before transfer to [facility] due to inaccurate phone number in chart. She deferred another MRI, but EEG was completed from 5/26-5/27 and was negative for epileptiform activity. Pt has had 2 recent CT scans showing stable severe chronic microvascular  ischemic disease. For the patient's MS, she received Plegridy every 2 weeks on 5/30, 6/13 with the next dose due on 06/27.  Patient was also noted to have holosystolic murmur on exam so an echo was ordered which confirmed mild-moderate aortic stenosis. She will need an echocardigram yearly for monitoring.     During this hospitalization, patient underwent 6 treatments of ECT. Given robust clinical response and reduction in depression, psychiatry determined that she is stable to transition to 1 treatment per week as outpatient for maintenance and no longer need further inpatient psych admission.  The plan is for her to continue once weekly maintenance ECT as transported from NH. Daughter informed and agreed to specifics around outpatient ECT including need for consenting to maintenance ECT via ECT team, for pt to be accompanied by responsible adult with each treatment, need for her to be NPO night before treatments, and need for her to get COVID test within 72 hours of each treatment. As per psych, she will continue her current medication regimen Prozac 20mg daily, Flyehavwit2in HS, Seroquel 12.5mg HS, and Ativan 0.5mg PO Q 6PRN anxiety.

## 2022-05-28 NOTE — DISCHARGE NOTE PROVIDER - NSFOLLOWUPCLINICS_GEN_ALL_ED_FT
[facility]  Neurology Epilepsy  [address] [place] [address]  Phone: [phone]  Fax: [phone]  Follow Up Time: Routine

## 2022-05-28 NOTE — PROGRESS NOTE ADULT - ASSESSMENT
72 yF PMHx vascular Dementia (A+Ox1-2), MDD (hx of SI), HTN, HLD, MS on Plegridy, COPD (not on home O2)  presents from [facility] for unresponsiveness for 10-15 seconds, EEG negative and daughter reports these have happened multiple times without new findings on MRI. Pending tranfer back to [facility] when a bed is available.

## 2022-05-28 NOTE — DISCHARGE NOTE PROVIDER - NSDCCPTREATMENT_GEN_ALL_CORE_FT
PRINCIPAL PROCEDURE  Procedure: Transthoracic echocardiography  Findings and Treatment: CONCLUSIONS:  1. Mitral annular calcification, otherwise normal mitral  valve. Minimal mitral regurgitation.  2. Aortic valve leaflet morphology not well visualized.  Peak transaortic valve gradient equals 35 mm Hg, mean  transaortic valve gradient equals 21 mm Hg, consistent with  probable mild to mdoerate aortic stenosis.  3. Endocardium not well visualized; grossly normal left  ventricular systolic function.  Estimated LVEF in the 65%  range (by visual estimate).  Endocardial visualization  enhanced with intravenous injection of echo contrast  (Definity).  4. The right ventricle is not well visualized; grossly  normal right ventricular systolic function.

## 2022-05-28 NOTE — PROGRESS NOTE ADULT - PROBLEM SELECTOR PLAN 1
Presented with unresponsiveness, eye rolling and tongue movements, 10-15 second episode, of note, pt DID NOT RECEIVE ECT on 5/25 confirmed by psychiatry team at [facility]  - Neurology following, increased atorvastatin to 80 at their request  - Ammonia, CK, lactate, lipid studies all normal  - Per discussion with April, daughter and HCP of pt, she has had multiple episodes identical to this, has had numerous MRIs and EEGs without actionable or new findings, daughter is upset and confirmed that she should be contacted before transfer to medical hospital as she needs to give consent for hospitalization as they have MOLST form documenting Do Not Hospitalize  - 24 hour EEG on 5/26-5/27 Normal  - hold off on MRI per pt daughter/HCP  - Pending discharge to [facility]

## 2022-05-28 NOTE — DISCHARGE NOTE PROVIDER - NSDCCPCAREPLAN_GEN_ALL_CORE_FT
PRINCIPAL DISCHARGE DIAGNOSIS  Diagnosis: AMS (altered mental status)  Assessment and Plan of Treatment: We are uncertain the cause of  your episode of unresponsiveness. Previous MRIs and CT scans have shown nothing acute, although we know you have chronic severe vascular disease in the brain. We performed a 24 hr EEG from 5/26-5/27 which did not show a seizure, however you did not have an episode during this time. Please follow up with a Neurologist if it is within your goals of care to continue investigation or initiate treatment for these episodes.       PRINCIPAL DISCHARGE DIAGNOSIS  Diagnosis: AMS (altered mental status)  Assessment and Plan of Treatment: We are uncertain the cause of  your episode of unresponsiveness. Previous MRIs and CT scans have shown nothing acute, although we know you have chronic severe vascular disease in the brain. We performed a 24 hr EEG from 5/26-5/27 which did not show a seizure.      SECONDARY DISCHARGE DIAGNOSES  Diagnosis: Mild aortic stenosis  Assessment and Plan of Treatment:     Diagnosis: Major depression  Assessment and Plan of Treatment:     Diagnosis: Multiple sclerosis  Assessment and Plan of Treatment:      PRINCIPAL DISCHARGE DIAGNOSIS  Diagnosis: AMS (altered mental status)  Assessment and Plan of Treatment: We are uncertain the cause of  your episode of unresponsiveness. Previous MRIs and CT scans have shown nothing acute, although we know you have chronic severe vascular disease in the brain. We performed a 24 hr EEG from 5/26-5/27 which did not show a seizure.      SECONDARY DISCHARGE DIAGNOSES  Diagnosis: Major depression  Assessment and Plan of Treatment: You were followed by psychiatry during this admission and was started on ECT to treat your major depression. You responded very well to the treatments. Given the significant improvement, psychiatry determined that you can transition to 1 treatment per week as outpatient for maintenance. Please continue your current medication regimen Prozac 20mg daily, [id number] HS, Seroquel 12.5mg HS, and Ativan 0.5mg PO Q 6PRN anxiety. Your next ECT appt is scheduled for Wednesday, June 22 @ 8AM. As discussed, you will need to be NPO the night before and obtain a COVID test within 72 hours of each treatment. You will need to be accompanied by responsible adult with each treatment. The location and contact number is below:   [address] [place] [address], ECT [address], [facility] [place] [address] [phone]    Diagnosis: Mild aortic stenosis  Assessment and Plan of Treatment: Aortic stenosis is a condition in which the aortic valve doesn't open fully. The aortic valve is one of the 4 heart valves. The heart valves keep blood flowing in only 1 direction. When the heart valves work normally, they open all the way to let blood flow through them. In aortic stenosis, the aortic valve gets stuck and does not open fully. This makes the valve opening narrow and as a result, makes it difficult to blood to flow out of the heart to the rest of the body.   Aortic stenosis usually happens in adults and most of the time, does not cause any issues. However, it is possible that the valve opening can get more narrow over time.Therefore, it is important to monitor yearly with an echocardiogram.    Diagnosis: Multiple sclerosis  Assessment and Plan of Treatment: You received Plegridy every 2 weeks on 5/30, 6/13 with the next dose due on 06/27.

## 2022-05-28 NOTE — PROGRESS NOTE ADULT - SUBJECTIVE AND OBJECTIVE BOX
Ofe Curran MD  EM/IM PGY-1  Pager # [phone] or Teams  --------------------------------  ==============UNFINISHED NOTE==================    INTERVAL HPI/OVERNIGHT EVENTS: No acute events overnight, vitals remained stable. All scheduled medications given, No PRN medications required.     SUBJECTIVE: Patient seen and examined at bedside.     VITAL SIGNS:  T(C): 36.9 (05-28-22 @ 05:25), Max: 36.9 (05-27-22 @ 21:37)  HR: 60 (05-28-22 @ 05:25) (60 - 79)  BP: 102/66 (05-28-22 @ 05:25) (102/66 - 146/72)  RR: 18 (05-28-22 @ 05:25) (18 - 18)  SpO2: 98% (05-28-22 @ 05:25) (97% - 100%)      PHYSICAL EXAM:        MEDICATIONS:  MEDICATIONS  (STANDING):  atorvastatin 80 milliGRAM(s) Oral at bedtime  cholecalciferol 2000 Unit(s) Oral daily  enoxaparin Injectable 40 milliGRAM(s) SubCutaneous every 24 hours  FLUoxetine 40 milliGRAM(s) Oral daily  hydrochlorothiazide 25 milliGRAM(s) Oral daily  lactated ringers. 1000 milliLiter(s) (100 mL/Hr) IV Continuous <Continuous>  lisinopril 20 milliGRAM(s) Oral daily  methimazole 5 milliGRAM(s) Oral daily  QUEtiapine 12.5 milliGRAM(s) Oral at bedtime  rOPINIRole 2 milliGRAM(s) Oral at bedtime    MEDICATIONS  (PRN):  acetaminophen     Tablet .. 650 milliGRAM(s) Oral every 6 hours PRN Temp greater or equal to 38C (100.4F), Mild Pain (1 - 3)  diphenhydrAMINE 25 milliGRAM(s) Oral every 4 hours PRN Rash and/or Itching  LORazepam     Tablet 0.5 milliGRAM(s) Oral every 6 hours PRN Anxiety      LABS:                        11.4   6.69  )-----------( 215      ( 27 May 2022 06:32 )             34.6     05-27    135  |  103  |  15  ----------------------------<  91  3.9   |  24  |  0.67    Ca    9.3      27 May 2022 06:32  Phos  3.3     05-27  Mg     1.90     05-27             Ofe Curran MD  EM/IM PGY-1  Pager # [phone] or Teams  --------------------------------    INTERVAL HPI/OVERNIGHT EVENTS: No acute events overnight, vitals remained stable. All scheduled medications given, No PRN medications required.     SUBJECTIVE: Patient seen and examined at bedside. She denies any pain, trouble breathing, or other symptoms. Is not hungry just wants to sleep.    VITAL SIGNS:  T(C): 36.9 (05-28-22 @ 05:25), Max: 36.9 (05-27-22 @ 21:37)  HR: 60 (05-28-22 @ 05:25) (60 - 79)  BP: 102/66 (05-28-22 @ 05:25) (102/66 - 146/72)  RR: 18 (05-28-22 @ 05:25) (18 - 18)  SpO2: 98% (05-28-22 @ 05:25) (97% - 100%)      PHYSICAL EXAM:    GENERAL: Lying in bed in no acute distress  NEURO: Alert and Oriented to person, place (hospital), not year (said 2020) nor situation. Follows commands and converses coherently. Pupils symmetric, No ptosis. No facial asymmetry or dysarthria, no tremor noted.  HEENT: No conjunctival injection or scleral icterus.   CARD: Normal rate and regular rhythm, no murmurs and no gallops appreciated.  RESP: Clear to auscultation bilaterally in anterior lung fields, No wheezes, rales or rhonchi. Good respiratory effort.  ABD: Nondistended, Soft and nontender to palpation in all quadrants, no guarding, no rigidity.   EXT: No pedal edema. 2+DP pulses bilaterally. 4/5 strength leg raise bilaterally    MEDICATIONS:  MEDICATIONS  (STANDING):  atorvastatin 80 milliGRAM(s) Oral at bedtime  cholecalciferol 2000 Unit(s) Oral daily  enoxaparin Injectable 40 milliGRAM(s) SubCutaneous every 24 hours  FLUoxetine 40 milliGRAM(s) Oral daily  hydrochlorothiazide 25 milliGRAM(s) Oral daily  lactated ringers. 1000 milliLiter(s) (100 mL/Hr) IV Continuous <Continuous>  lisinopril 20 milliGRAM(s) Oral daily  methimazole 5 milliGRAM(s) Oral daily  QUEtiapine 12.5 milliGRAM(s) Oral at bedtime  rOPINIRole 2 milliGRAM(s) Oral at bedtime    MEDICATIONS  (PRN):  acetaminophen     Tablet .. 650 milliGRAM(s) Oral every 6 hours PRN Temp greater or equal to 38C (100.4F), Mild Pain (1 - 3)  diphenhydrAMINE 25 milliGRAM(s) Oral every 4 hours PRN Rash and/or Itching  LORazepam     Tablet 0.5 milliGRAM(s) Oral every 6 hours PRN Anxiety      LABS:                        11.4   6.69  )-----------( 215      ( 27 May 2022 06:32 )             34.6     05-27    135  |  103  |  15  ----------------------------<  91  3.9   |  24  |  0.67    Ca    9.3      27 May 2022 06:32  Phos  3.3     05-27  Mg     1.90     05-27

## 2022-05-28 NOTE — DISCHARGE NOTE PROVIDER - NSDCFUSCHEDAPPT_GEN_ALL_CORE_FT
Doctor, Not Available  [facility]  [facility] ECT  Scheduled Appointment: 06/17/2022

## 2022-05-29 LAB — SARS-COV-2 RNA SPEC QL NAA+PROBE: SIGNIFICANT CHANGE UP

## 2022-05-29 PROCEDURE — 99232 SBSQ HOSP IP/OBS MODERATE 35: CPT | Mod: GC

## 2022-05-29 RX ADMIN — QUETIAPINE FUMARATE 12.5 MILLIGRAM(S): 200 TABLET, FILM COATED ORAL at 22:14

## 2022-05-29 RX ADMIN — ROPINIROLE 2 MILLIGRAM(S): 8 TABLET, FILM COATED, EXTENDED RELEASE ORAL at 22:14

## 2022-05-29 RX ADMIN — Medication 25 MILLIGRAM(S): at 05:57

## 2022-05-29 RX ADMIN — LISINOPRIL 20 MILLIGRAM(S): 2.5 TABLET ORAL at 05:57

## 2022-05-29 RX ADMIN — ENOXAPARIN SODIUM 40 MILLIGRAM(S): 100 INJECTION SUBCUTANEOUS at 05:57

## 2022-05-29 RX ADMIN — Medication 2000 UNIT(S): at 12:20

## 2022-05-29 RX ADMIN — ATORVASTATIN CALCIUM 80 MILLIGRAM(S): 80 TABLET, FILM COATED ORAL at 22:14

## 2022-05-29 RX ADMIN — Medication 40 MILLIGRAM(S): at 12:20

## 2022-05-29 NOTE — PROGRESS NOTE ADULT - PROBLEM SELECTOR PLAN 2
- Severe episode of recurrent major depressive disorder, without psychotic features was the reason for her admission to [facility]  - Patient with hx of MDD and anxiety, before being admitted was on prozac 40mg qd, trazodone 100mg qhs, and klonipin 0.5ms qd, since she has been at [facility] medication changes were as follows: Continued prozac, DC klonipin and trazodone for over-sedation, started Quetiapine 12.5mg qd and were planning for ECT  - As per notes patient tried cutting her wrists with butter knife on 5/12/22  - TSH wnl 0.46 on 5/18 with normal triiodothyronine at 3.03  - On vitamin D supplementation at home  - Continue Prozac 40 mg daily, Quetiapine 12.5mg qhs

## 2022-05-29 NOTE — PROGRESS NOTE ADULT - SUBJECTIVE AND OBJECTIVE BOX
Dallin Up MD, PGY-3  Internal Medicine  NS: [phone]//[facility]: [id number]    INTERVAL HPI/OVERNIGHT EVENTS: [place] o/n, vitals remained stable. All scheduled medications given, No PRN medications required.     SUBJECTIVE: Patient seen and examined at bedside. She denies any pain, trouble breathing, or other symptoms.    VITAL SIGNS:    Vital Signs Last 24 Hrs  T(C): 36.4 (29 May 2022 05:37), Max: 36.8 (28 May 2022 22:01)  T(F): 97.5 (29 May 2022 05:37), Max: 98.3 (28 May 2022 22:01)  HR: 96 (29 May 2022 05:37) (62 - 96)  BP: 125/54 (29 May 2022 05:37) (109/51 - 144/54)  RR: 18 (29 May 2022 05:37) (18 - 18)  SpO2: 98% (29 May 2022 05:37) (96% - 100%)      PHYSICAL EXAM:    GENERAL: Lying in bed in no acute distress  NEURO: Alert and Oriented to person, place (hospital), not year (said 2020) nor situation. Follows commands and converses coherently. Pupils symmetric, No ptosis. No facial asymmetry or dysarthria, no tremor noted.  HEENT: No conjunctival injection or scleral icterus.   CARD: Normal rate and regular rhythm, no murmurs and no gallops appreciated.  RESP: Clear to auscultation bilaterally in anterior lung fields, No wheezes, rales or rhonchi. Good respiratory effort.  ABD: Nondistended, Soft and nontender to palpation in all quadrants, no guarding, no rigidity.   EXT: No pedal edema. 2+DP pulses bilaterally. 4/5 strength leg raise bilaterally    MEDICATIONS:  MEDICATIONS  (STANDING):  atorvastatin 80 milliGRAM(s) Oral at bedtime  cholecalciferol 2000 Unit(s) Oral daily  enoxaparin Injectable 40 milliGRAM(s) SubCutaneous every 24 hours  FLUoxetine 40 milliGRAM(s) Oral daily  hydrochlorothiazide 25 milliGRAM(s) Oral daily  lactated ringers. 1000 milliLiter(s) (100 mL/Hr) IV Continuous <Continuous>  lisinopril 20 milliGRAM(s) Oral daily  methimazole 5 milliGRAM(s) Oral daily  QUEtiapine 12.5 milliGRAM(s) Oral at bedtime  rOPINIRole 2 milliGRAM(s) Oral at bedtime    MEDICATIONS  (PRN):  acetaminophen     Tablet .. 650 milliGRAM(s) Oral every 6 hours PRN Temp greater or equal to 38C (100.4F), Mild Pain (1 - 3)  diphenhydrAMINE 25 milliGRAM(s) Oral every 4 hours PRN Rash and/or Itching  LORazepam     Tablet 0.5 milliGRAM(s) Oral every 6 hours PRN Anxiety      LABS:    LABS:  cret                        11.4   6.69  )-----------( 215      ( 27 May 2022 06:32 )             34.6     05-27    135  |  103  |  15  ----------------------------<  91  3.9   |  24  |  0.67    Ca    9.3      27 May 2022 06:32  Phos  3.3     05-27  Mg     1.90     05-27

## 2022-05-29 NOTE — PROGRESS NOTE ADULT - PROBLEM SELECTOR PLAN 7
DVT ppx: Lovenox SQ 40mg QD  GOC - DNR/DNI/DNH- MOLST in chart - comfort measures only  Dispo: Pt recommends rehab, but family would like transfer back to [facility]  Diet: Regular

## 2022-05-29 NOTE — PROGRESS NOTE ADULT - PROBLEM SELECTOR PLAN 1
Presented with unresponsiveness, eye rolling and tongue movements, 10-15 second episode, of note, pt DID NOT RECEIVE ECT on 5/25 confirmed by psychiatry team at [facility]  - Neurology following, increased atorvastatin to 80 at their request  - Ammonia, CK, lactate, lipid studies all normal  - Per discussion with April, daughter and HCP of pt, she has had multiple episodes identical to this, has had numerous MRIs and EEGs without actionable or new findings, daughter is upset and confirmed that she should be contacted before transfer to medical hospital as she needs to give consent for hospitalization as they have MOLST form documenting Do Not Hospitalize  - 24 hour EEG on 5/26-5/27 Normal  - hold off on MRI per pt daughter/HCP  - Pending discharge to [facility]

## 2022-05-29 NOTE — PROGRESS NOTE ADULT - ASSESSMENT
72 yF PMHx vascular Dementia (A+Ox1-2), MDD (hx of SI), HTN, HLD, MS on Plegridy, COPD (not on home O2)  presents from [facility] for unresponsiveness for 10-15 seconds, EEG negative and daughter reports these have happened multiple times without new findings on MRI. Pending tranfer back to [facility] when a bed is available.

## 2022-05-29 NOTE — PROGRESS NOTE ADULT - PROBLEM SELECTOR PLAN 3
- CT head 5/18 and 5/25 with Severe chronic microvascular ischemic changes.  - At bedside patient is A&Ox2-3, awake and alert and follows commands, appears to be at baseline  - UA negative, CXR neg, doesn't appear different from baseline documented at [facility] inpt notes

## 2022-05-29 NOTE — PROGRESS NOTE ADULT - ATTENDING COMMENTS
72W with PMH MDD with hx of SI and prior attempts, vascular dementia, T2DM, HTN, HLD, MS, COPD who is presenting from [facility] with unresponsive episode PRIOR to her first session of ECT. Per daughter she had been worked up multiple times for identical episodes without being able to identify organic cause.   Pt is a/ox2-3, awake and alert    # Unresponsive episode -   mental status at baseline, has baseline dementia, per daughter she has had multiple MRIs, EEGs, and blood tests not being able to identify organic cause, she was in [facility] to start ECT as this was thought to be episodes of catatonia. EEG here again showing no epileptiform patterns. No need for MRI.  DC plan to [facility] pending bed likely Tue  DNR

## 2022-05-30 PROCEDURE — 99232 SBSQ HOSP IP/OBS MODERATE 35: CPT | Mod: GC

## 2022-05-30 RX ADMIN — Medication 40 MILLIGRAM(S): at 12:05

## 2022-05-30 RX ADMIN — ROPINIROLE 2 MILLIGRAM(S): 8 TABLET, FILM COATED, EXTENDED RELEASE ORAL at 21:28

## 2022-05-30 RX ADMIN — ENOXAPARIN SODIUM 40 MILLIGRAM(S): 100 INJECTION SUBCUTANEOUS at 05:37

## 2022-05-30 RX ADMIN — QUETIAPINE FUMARATE 12.5 MILLIGRAM(S): 200 TABLET, FILM COATED ORAL at 21:28

## 2022-05-30 RX ADMIN — Medication 2000 UNIT(S): at 12:05

## 2022-05-30 RX ADMIN — ATORVASTATIN CALCIUM 80 MILLIGRAM(S): 80 TABLET, FILM COATED ORAL at 21:29

## 2022-05-30 NOTE — PROGRESS NOTE ADULT - PROBLEM SELECTOR PLAN 7
DVT ppx: Lovenox SQ 40mg QD  GOC - DNR/DNI/DNH- MOLST in chart - comfort measures only  Dispo: Pt recommends rehab, but family would like transfer back to [facility]  Diet: Regular

## 2022-05-30 NOTE — PROGRESS NOTE ADULT - SUBJECTIVE AND OBJECTIVE BOX
Madhav Chaves, PGY1  Pager [phone] [facility] or [phone] [facility]    Patient is a 72y old  Female who presents with a chief complaint of unresponsiveness (29 May 2022 06:10)      SUBJECTIVE/INTERVAL EVENTS: Patient seen and examined at bedside.  Patient was seen and examined at bedside this morning. Denies any nausea/vomiting/diarrhea, headache, shortness of breath, abdominal pain or chest pain/palpitations. Patient responding appropriately to questions and able to make needs known. Vital signs/imaging/telemetry events reviewed.      MEDICATIONS  (STANDING):  atorvastatin 80 milliGRAM(s) Oral at bedtime  cholecalciferol 2000 Unit(s) Oral daily  enoxaparin Injectable 40 milliGRAM(s) SubCutaneous every 24 hours  FLUoxetine 40 milliGRAM(s) Oral daily  hydrochlorothiazide 25 milliGRAM(s) Oral daily  lactated ringers. 1000 milliLiter(s) (100 mL/Hr) IV Continuous <Continuous>  lisinopril 20 milliGRAM(s) Oral daily  methimazole 5 milliGRAM(s) Oral daily  QUEtiapine 12.5 milliGRAM(s) Oral at bedtime  rOPINIRole 2 milliGRAM(s) Oral at bedtime    MEDICATIONS  (PRN):  acetaminophen     Tablet .. 650 milliGRAM(s) Oral every 6 hours PRN Temp greater or equal to 38C (100.4F), Mild Pain (1 - 3)  diphenhydrAMINE 25 milliGRAM(s) Oral every 4 hours PRN Rash and/or Itching  LORazepam     Tablet 0.5 milliGRAM(s) Oral every 6 hours PRN Anxiety      VITAL SIGNS:  T(F): 98.2 (05-30-22 @ 05:04), Max: 98.8 (05-29-22 @ 12:31)  HR: 65 (05-30-22 @ 05:04) (65 - 69)  BP: 118/60 (05-30-22 @ 05:04) (107/63 - 124/60)  RR: 17 (05-30-22 @ 05:04) (17 - 17)  SpO2: 98% (05-30-22 @ 05:04) (97% - 98%)    I&O's Summary    Daily     Daily     PHYSICAL EXAM:  Gen: Alert, NAD  HEENT: NCAT, conjunctiva clear, sclera anicteric, no erythema or exudates in the oropharynx, mmm  Neck: Supple, no JVD  CV: RRR, S1S2, no m/r/g  Resp: CTAB, normal respiratory effort  Abd: Soft, nontender, nondistended, normal bowel sounds  Ext: no edema, no clubbing or cyanosis  Neuro: AOx2(name and place)   SKIN: warm, perfused    LABS:    Hgb Trend: 11.4<--, 11.9<--, 12.5<--        Creatinine Trend: 0.67<--, 0.94<--, 0.89<--, 0.77<--, 0.71<--, 0.74<--              CAPILLARY BLOOD GLUCOSE          RADIOLOGY & ADDITIONAL TESTS: Reviewed    Imaging Personally Reviewed:    Consultant(s) Notes Reviewed:      Care Discussed with Consultants/Other Providers:

## 2022-05-30 NOTE — PROGRESS NOTE ADULT - PROBLEM SELECTOR PLAN 1
Presented with unresponsiveness, eye rolling and tongue movements, 10-15 second episode, of note, pt DID NOT RECEIVE ECT on 5/25 confirmed by psychiatry team at [facility]  - Neurology following, c/w atorvastatin 80  - Per discussion with April, daughter and HCP of pt, she has had multiple episodes identical to this, has had numerous MRIs and EEGs without actionable or new findings, daughter is upset and confirmed that she should be contacted before transfer to medical hospital as she needs to give consent for hospitalization as they have MOLST form documenting Do Not Hospitalize  - 24 hour EEG on 5/26-5/27 Normal  - hold off on MRI per pt daughter/HCP  - Pending discharge to [facility]

## 2022-05-30 NOTE — PROGRESS NOTE ADULT - PROBLEM SELECTOR PLAN 2
- Severe episode of recurrent major depressive disorder, without psychotic features was the reason for her admission to [facility]  - Continue Prozac 40 mg daily, Quetiapine 12.5mg qhs

## 2022-05-30 NOTE — PROGRESS NOTE ADULT - PROBLEM SELECTOR PLAN 3
- CT head 5/18 and 5/25 with Severe chronic microvascular ischemic changes.  - At bedside patient is A&Ox2-3, awake and alert and follows commands, appears to be at baseline  - UA negative, CXR neg, doesn't appear different from baseline documented at [facility] inpt notes

## 2022-05-30 NOTE — PROGRESS NOTE ADULT - ATTENDING COMMENTS
72W with PMH MDD with hx of SI and prior attempts, vascular dementia, T2DM, HTN, HLD, MS, COPD who is presenting from [facility] with unresponsive episode PRIOR to her first session of ECT. Per daughter she had been worked up multiple times for identical episodes without being able to identify organic cause.     # unresponsive episode  resolved, mental status improved, back to baseline dementia, per daughter pt has had multiple MRIs, EEGs, and blood tests not being able to identify organic cause, she was in [facility] to start ECT as this was thought to be episodes of catatonia. EEG here again showing no epileptiform patterns. No need for MRI. Waiting for [facility] bed, f/u SW. DNR 72W with PMH MDD with hx of SI and prior attempts, vascular dementia, T2DM, HTN, HLD, MS, COPD who is presenting from [facility] with unresponsive episode PRIOR to her first session of ECT. Per daughter she had been worked up multiple times for identical episodes without being able to identify organic cause.     # unresponsive episode  resolved, mental status improved, back to baseline dementia, per daughter pt has had multiple MRIs, EEGs, and blood tests not being able to identify organic cause, she was in [facility] to start ECT as this was thought to be episodes of catatonia. EEG here again showing no epileptiform patterns. No need for MRI. Waiting for [facility] bed, f/u SW. DNR  # MS: family to bring pegylated interferon for SQ injection, needs to be confirmed by pharmacy

## 2022-05-30 NOTE — PROGRESS NOTE ADULT - PROBLEM SELECTOR PLAN 5
- hx of MS  - wheelchair bound but can transfer and walk a few steps with walker, incontinent   - pt receives Plegridy q 2 weeks - next dose 05/30   - f/u neuro outpatient for same  Daughter said patient's  will bring in medication in interferon beta for patient today

## 2022-05-30 NOTE — PROGRESS NOTE ADULT - ASSESSMENT
72 yF PMHx vascular Dementia (A+Ox1-2), MDD (hx of SI), HTN, HLD, MS on Plegridy, COPD (not on home O2)  presents from [facility] for unresponsiveness for 10-15 seconds, EEG negative and daughter reports these have happened multiple times without new findings on MRI. Pending tranfer back to [facility] when a bed is available.

## 2022-05-31 LAB
CULTURE RESULTS: SIGNIFICANT CHANGE UP
CULTURE RESULTS: SIGNIFICANT CHANGE UP
SARS-COV-2 RNA SPEC QL NAA+PROBE: SIGNIFICANT CHANGE UP
SPECIMEN SOURCE: SIGNIFICANT CHANGE UP
SPECIMEN SOURCE: SIGNIFICANT CHANGE UP

## 2022-05-31 PROCEDURE — 90870 ELECTROCONVULSIVE THERAPY: CPT

## 2022-05-31 PROCEDURE — 99232 SBSQ HOSP IP/OBS MODERATE 35: CPT | Mod: GC

## 2022-05-31 RX ADMIN — Medication 40 MILLIGRAM(S): at 11:50

## 2022-05-31 RX ADMIN — QUETIAPINE FUMARATE 12.5 MILLIGRAM(S): 200 TABLET, FILM COATED ORAL at 21:51

## 2022-05-31 RX ADMIN — ENOXAPARIN SODIUM 40 MILLIGRAM(S): 100 INJECTION SUBCUTANEOUS at 05:35

## 2022-05-31 RX ADMIN — ROPINIROLE 2 MILLIGRAM(S): 8 TABLET, FILM COATED, EXTENDED RELEASE ORAL at 21:47

## 2022-05-31 RX ADMIN — ATORVASTATIN CALCIUM 80 MILLIGRAM(S): 80 TABLET, FILM COATED ORAL at 21:47

## 2022-05-31 RX ADMIN — Medication 2000 UNIT(S): at 11:51

## 2022-05-31 NOTE — PROGRESS NOTE ADULT - SUBJECTIVE AND OBJECTIVE BOX
Madhav Chaves, PGY1  Pager [phone] [facility] or [phone] [facility]    Patient is a 72y old  Female who presents with a chief complaint of unresponsiveness (30 May 2022 07:08)      SUBJECTIVE/INTERVAL EVENTS: Patient seen and examined at bedside.  Patient was seen and examined at bedside this morning. Denies any nausea/vomiting/diarrhea, headache, shortness of breath, abdominal pain or chest pain/palpitations. Patient responding appropriately to questions and able to make needs known. Vital signs/imaging/telemetry events reviewed.      MEDICATIONS  (STANDING):  atorvastatin 80 milliGRAM(s) Oral at bedtime  cholecalciferol 2000 Unit(s) Oral daily  enoxaparin Injectable 40 milliGRAM(s) SubCutaneous every 24 hours  FLUoxetine 40 milliGRAM(s) Oral daily  hydrochlorothiazide 25 milliGRAM(s) Oral daily  lisinopril 20 milliGRAM(s) Oral daily  methimazole 5 milliGRAM(s) Oral daily  QUEtiapine 12.5 milliGRAM(s) Oral at bedtime  rOPINIRole 2 milliGRAM(s) Oral at bedtime    MEDICATIONS  (PRN):  acetaminophen     Tablet .. 650 milliGRAM(s) Oral every 6 hours PRN Temp greater or equal to 38C (100.4F), Mild Pain (1 - 3)  diphenhydrAMINE 25 milliGRAM(s) Oral every 4 hours PRN Rash and/or Itching  LORazepam     Tablet 0.5 milliGRAM(s) Oral every 6 hours PRN Anxiety      VITAL SIGNS:  T(F): 98.6 (05-31-22 @ 05:25), Max: 99.1 (05-30-22 @ 21:05)  HR: 70 (05-31-22 @ 05:25) (67 - 71)  BP: 116/78 (05-31-22 @ 05:25) (116/58 - 124/60)  RR: 16 (05-31-22 @ 05:25) (16 - 18)  SpO2: 97% (05-31-22 @ 05:25) (96% - 97%)    I&O's Summary    Daily     Daily     PHYSICAL EXAM:  Gen: Alert, NAD  HEENT: NCAT, conjunctiva clear, sclera anicteric, no erythema or exudates in the oropharynx, mmm  Neck: Supple, no JVD  CV: RRR, S1S2, no m/r/g  Resp: CTAB, normal respiratory effort  Abd: Soft, nontender, nondistended, normal bowel sounds  Ext: no edema, no clubbing or cyanosis  Neuro: AOx2(name and place)   SKIN: warm, perfused  LABS:    Hgb Trend: 11.4<--, 11.9<--, 12.5<--        Creatinine Trend: 0.67<--, 0.94<--, 0.89<--, 0.77<--, 0.71<--, 0.74<--              CAPILLARY BLOOD GLUCOSE          RADIOLOGY & ADDITIONAL TESTS: Reviewed    Imaging Personally Reviewed:    Consultant(s) Notes Reviewed:      Care Discussed with Consultants/Other Providers:   Madhav Chaves, PGY1  Pager [phone] [facility] or [phone] [facility]    Patient is a 72y old  Female who presents with a chief complaint of unresponsiveness (30 May 2022 07:08)      SUBJECTIVE/INTERVAL EVENTS: Patient seen and examined at bedside.  ETC       MEDICATIONS  (STANDING):  atorvastatin 80 milliGRAM(s) Oral at bedtime  cholecalciferol 2000 Unit(s) Oral daily  enoxaparin Injectable 40 milliGRAM(s) SubCutaneous every 24 hours  FLUoxetine 40 milliGRAM(s) Oral daily  hydrochlorothiazide 25 milliGRAM(s) Oral daily  lisinopril 20 milliGRAM(s) Oral daily  methimazole 5 milliGRAM(s) Oral daily  QUEtiapine 12.5 milliGRAM(s) Oral at bedtime  rOPINIRole 2 milliGRAM(s) Oral at bedtime    MEDICATIONS  (PRN):  acetaminophen     Tablet .. 650 milliGRAM(s) Oral every 6 hours PRN Temp greater or equal to 38C (100.4F), Mild Pain (1 - 3)  diphenhydrAMINE 25 milliGRAM(s) Oral every 4 hours PRN Rash and/or Itching  LORazepam     Tablet 0.5 milliGRAM(s) Oral every 6 hours PRN Anxiety      VITAL SIGNS:  T(F): 98.6 (05-31-22 @ 05:25), Max: 99.1 (05-30-22 @ 21:05)  HR: 70 (05-31-22 @ 05:25) (67 - 71)  BP: 116/78 (05-31-22 @ 05:25) (116/58 - 124/60)  RR: 16 (05-31-22 @ 05:25) (16 - 18)  SpO2: 97% (05-31-22 @ 05:25) (96% - 97%)    I&O's Summary    Daily     Daily     PHYSICAL EXAM:  ECT will evaluate when patient comes back    LABS:    Hgb Trend: 11.4<--, 11.9<--, 12.5<--        Creatinine Trend: 0.67<--, 0.94<--, 0.89<--, 0.77<--, 0.71<--, 0.74<--              CAPILLARY BLOOD GLUCOSE          RADIOLOGY & ADDITIONAL TESTS: Reviewed    Imaging Personally Reviewed:    Consultant(s) Notes Reviewed:      Care Discussed with Consultants/Other Providers:   Madhav Chaves, PGY1  Pager [phone] [facility] or [phone] [facility]    Patient is a 72y old  Female who presents with a chief complaint of unresponsiveness (30 May 2022 07:08)      SUBJECTIVE/INTERVAL EVENTS: ECT      MEDICATIONS  (STANDING):  atorvastatin 80 milliGRAM(s) Oral at bedtime  cholecalciferol 2000 Unit(s) Oral daily  enoxaparin Injectable 40 milliGRAM(s) SubCutaneous every 24 hours  FLUoxetine 40 milliGRAM(s) Oral daily  hydrochlorothiazide 25 milliGRAM(s) Oral daily  lisinopril 20 milliGRAM(s) Oral daily  methimazole 5 milliGRAM(s) Oral daily  QUEtiapine 12.5 milliGRAM(s) Oral at bedtime  rOPINIRole 2 milliGRAM(s) Oral at bedtime    MEDICATIONS  (PRN):  acetaminophen     Tablet .. 650 milliGRAM(s) Oral every 6 hours PRN Temp greater or equal to 38C (100.4F), Mild Pain (1 - 3)  diphenhydrAMINE 25 milliGRAM(s) Oral every 4 hours PRN Rash and/or Itching  LORazepam     Tablet 0.5 milliGRAM(s) Oral every 6 hours PRN Anxiety      VITAL SIGNS:  T(F): 98.6 (05-31-22 @ 05:25), Max: 99.1 (05-30-22 @ 21:05)  HR: 70 (05-31-22 @ 05:25) (67 - 71)  BP: 116/78 (05-31-22 @ 05:25) (116/58 - 124/60)  RR: 16 (05-31-22 @ 05:25) (16 - 18)  SpO2: 97% (05-31-22 @ 05:25) (96% - 97%)    I&O's Summary    Daily     Daily     PHYSICAL EXAM:  ECT will evaluate when patient comes back    LABS:    Hgb Trend: 11.4<--, 11.9<--, 12.5<--        Creatinine Trend: 0.67<--, 0.94<--, 0.89<--, 0.77<--, 0.71<--, 0.74<--              CAPILLARY BLOOD GLUCOSE          RADIOLOGY & ADDITIONAL TESTS: Reviewed    Imaging Personally Reviewed:    Consultant(s) Notes Reviewed:      Care Discussed with Consultants/Other Providers:   Madhav Chaves, PGY1  Pager [phone] [facility] or [phone] [facility]    Patient is a 72y old  Female who presents with a chief complaint of unresponsiveness (30 May 2022 07:08)      SUBJECTIVE/INTERVAL EVENTS:   s/p ECT today. Patient said she is having a headache after the treatment. Otherwise no sob, cp, abd pain.       MEDICATIONS  (STANDING):  atorvastatin 80 milliGRAM(s) Oral at bedtime  cholecalciferol 2000 Unit(s) Oral daily  enoxaparin Injectable 40 milliGRAM(s) SubCutaneous every 24 hours  FLUoxetine 40 milliGRAM(s) Oral daily  hydrochlorothiazide 25 milliGRAM(s) Oral daily  lisinopril 20 milliGRAM(s) Oral daily  methimazole 5 milliGRAM(s) Oral daily  QUEtiapine 12.5 milliGRAM(s) Oral at bedtime  rOPINIRole 2 milliGRAM(s) Oral at bedtime    MEDICATIONS  (PRN):  acetaminophen     Tablet .. 650 milliGRAM(s) Oral every 6 hours PRN Temp greater or equal to 38C (100.4F), Mild Pain (1 - 3)  diphenhydrAMINE 25 milliGRAM(s) Oral every 4 hours PRN Rash and/or Itching  LORazepam     Tablet 0.5 milliGRAM(s) Oral every 6 hours PRN Anxiety      VITAL SIGNS:  T(F): 98.6 (05-31-22 @ 05:25), Max: 99.1 (05-30-22 @ 21:05)  HR: 70 (05-31-22 @ 05:25) (67 - 71)  BP: 116/78 (05-31-22 @ 05:25) (116/58 - 124/60)  RR: 16 (05-31-22 @ 05:25) (16 - 18)  SpO2: 97% (05-31-22 @ 05:25) (96% - 97%)    I&O's Summary    Daily     Daily     PHYSICAL EXAM:  GENERAL: NAD, well-developed  HEENT:  Atraumatic, Normocephalic, EOMI, PERRLA, conjunctiva and sclera clear, oral mucosa moist, clear w/o any exudate   NECK: Supple, No JVD  CHEST/LUNG: Clear to auscultation bilaterally; No wheeze  HEART: RRR; No murmurs, rubs, or gallops  ABDOMEN: Soft, Nontender, Nondistended; Bowel sounds present  EXTREMITIES:  2+ Peripheral Pulses, No clubbing, cyanosis, or edema  PSYCH: AAOx2(name and place)  NEUROLOGY: non-focal, moving all extremities  SKIN: No rashes or lesions    LABS:    Hgb Trend: 11.4<--, 11.9<--, 12.5<--        Creatinine Trend: 0.67<--, 0.94<--, 0.89<--, 0.77<--, 0.71<--, 0.74<--              CAPILLARY BLOOD GLUCOSE          RADIOLOGY & ADDITIONAL TESTS: Reviewed    Imaging Personally Reviewed:    Consultant(s) Notes Reviewed:      Care Discussed with Consultants/Other Providers:

## 2022-05-31 NOTE — PROGRESS NOTE ADULT - PROBLEM SELECTOR PLAN 2
- Severe episode of recurrent major depressive disorder, without psychotic features was the reason for her admission to [facility]  - Continue Prozac 40 mg daily, Quetiapine 12.5mg qhs

## 2022-05-31 NOTE — SOCIAL WORK POST DISCHARGE FOLLOW UP NOTE - NSBHSWFOLLOWUP_PSY_ALL_CORE_FT
The pt was transferred to [facility] due to medical problems and was admitted to [facility]. The pt was transferred to [facility] due to medical problems and was admitted to [facility].  was contacted by the pt's insurance, [employer], representative, Kaylee Kruger, [phone] ext [phone], and informed her that the pt had been transferred to [facility] due to medical problems. A discharge summary was requested which was faxed to [facility] fax [phone].

## 2022-05-31 NOTE — PROGRESS NOTE ADULT - PROBLEM SELECTOR PLAN 1
Presented with unresponsiveness, eye rolling and tongue movements, 10-15 second episode, of note, pt DID NOT RECEIVE ECT on 5/25 confirmed by psychiatry team at [facility]  - Neurology following, c/w atorvastatin 80  - Per discussion with April, daughter and HCP of pt, she has had multiple episodes identical to this, has had numerous MRIs and EEGs without actionable or new findings, daughter is upset and confirmed that she should be contacted before transfer to medical hospital as she needs to give consent for hospitalization as they have MOLST form documenting Do Not Hospitalize  - 24 hour EEG on 5/26-5/27 Normal  - hold off on MRI per pt daughter/HCP  - Pending discharge to [facility]

## 2022-05-31 NOTE — PROGRESS NOTE ADULT - ATTENDING COMMENTS
72W with PMH MDD with hx of SI and prior attempts, vascular dementia, T2DM, HTN, HLD, MS, COPD who is presenting from [facility] with unresponsive episode PRIOR to her first session of ECT. Per daughter she had been worked up multiple times for identical episodes without being able to identify organic cause.     - pt w/ extensive w/u including MRI and EEG w/o elucidation etiology regarding unresponsive episodes  - currently at baseline mental status  - tolerated ECT this AM  - plan for transfer back to [facility]

## 2022-05-31 NOTE — PROGRESS NOTE ADULT - PROBLEM SELECTOR PLAN 3
- CT head 5/18 and 5/25 with Severe chronic microvascular ischemic changes.  - At bedside patient is A&Ox2-3, awake and alert and follows commands, appears to be at baseline  - UA negative, CXR neg, doesn't appear different from baseline documented at [facility] inpt notes

## 2022-05-31 NOTE — PROGRESS NOTE ADULT - ASSESSMENT
72 yF PMHx vascular Dementia (A+Ox1-2), MDD (hx of SI), HTN, HLD, MS on Plegridy, COPD (not on home O2)  presents from [facility] for unresponsiveness for 10-15 seconds, EEG negative and daughter reports these have happened multiple times without new findings on MRI. Pending tranfer back to [facility] when a bed is available.

## 2022-06-01 PROCEDURE — 99233 SBSQ HOSP IP/OBS HIGH 50: CPT

## 2022-06-01 PROCEDURE — 90870 ELECTROCONVULSIVE THERAPY: CPT

## 2022-06-01 PROCEDURE — 99232 SBSQ HOSP IP/OBS MODERATE 35: CPT | Mod: GC

## 2022-06-01 RX ADMIN — Medication 25 MILLIGRAM(S): at 20:11

## 2022-06-01 RX ADMIN — Medication 25 MILLIGRAM(S): at 05:24

## 2022-06-01 RX ADMIN — ENOXAPARIN SODIUM 40 MILLIGRAM(S): 100 INJECTION SUBCUTANEOUS at 05:33

## 2022-06-01 RX ADMIN — LISINOPRIL 20 MILLIGRAM(S): 2.5 TABLET ORAL at 05:24

## 2022-06-01 RX ADMIN — QUETIAPINE FUMARATE 12.5 MILLIGRAM(S): 200 TABLET, FILM COATED ORAL at 21:14

## 2022-06-01 RX ADMIN — ROPINIROLE 2 MILLIGRAM(S): 8 TABLET, FILM COATED, EXTENDED RELEASE ORAL at 21:16

## 2022-06-01 RX ADMIN — ATORVASTATIN CALCIUM 80 MILLIGRAM(S): 80 TABLET, FILM COATED ORAL at 21:16

## 2022-06-01 NOTE — PROGRESS NOTE ADULT - PROBLEM SELECTOR PLAN 1
Presented with unresponsiveness, eye rolling and tongue movements, 10-15 second episode, of note, pt DID NOT RECEIVE ECT on 5/25 confirmed by psychiatry team at [facility] c/w atorvastatin 80  - MOLST form documenting Do Not Hospitalize  - 24 hour EEG on 5/26-5/27 Normal  - hold off on MRI per pt daughter/HCP  - Pending discharge to [facility]  - s/p ECT on 6/1

## 2022-06-01 NOTE — PROGRESS NOTE ADULT - PROBLEM SELECTOR PLAN 7
DVT ppx: Lovenox SQ 40mg QD  GOC - DNR/DNI/DNH- MOLST in chart - comfort measures only  Dispo: Pt recommends rehab, but family would like transfer back to [facility]  Diet: Regular

## 2022-06-01 NOTE — PROGRESS NOTE ADULT - ATTENDING COMMENTS
72W with PMH MDD with hx of SI and prior attempts, vascular dementia, T2DM, HTN, HLD, MS, COPD who is presenting from [facility] with unresponsive episode PRIOR to her first session of ECT. Per daughter she had been worked up multiple times for identical episodes without being able to identify organic cause.     - pt w/ extensive w/u including MRI and EEG w/o elucidation etiology regarding unresponsive episodes  - currently at baseline mental status  - plan for transfer back to [facility]  - DC planning time: 36 min in coordinating DC plan with patient and team.

## 2022-06-01 NOTE — PROGRESS NOTE ADULT - PROBLEM SELECTOR PLAN 2
- Severe episode of recurrent major depressive disorder, without psychotic features was the reason for her admission to [facility]  - Continue Prozac 40 mg daily, Quetiapine 12.5mg qhs

## 2022-06-01 NOTE — PROGRESS NOTE ADULT - SUBJECTIVE AND OBJECTIVE BOX
Madhav Chaves, PGY1  Pager [phone] [facility] or [phone] [facility]    Patient is a 72y old  Female who presents with a chief complaint of unresponsiveness (31 May 2022 07:04)      SUBJECTIVE/INTERVAL EVENTS: Patient seen and examined at bedside.  Patient was seen and examined at bedside this morning. Denies any nausea/vomiting/diarrhea, headache, shortness of breath, abdominal pain or chest pain/palpitations. Patient responding appropriately to questions and able to make needs known. Vital signs/imaging/telemetry events reviewed.      MEDICATIONS  (STANDING):  atorvastatin 80 milliGRAM(s) Oral at bedtime  cholecalciferol 2000 Unit(s) Oral daily  enoxaparin Injectable 40 milliGRAM(s) SubCutaneous every 24 hours  FLUoxetine 40 milliGRAM(s) Oral daily  hydrochlorothiazide 25 milliGRAM(s) Oral daily  lisinopril 20 milliGRAM(s) Oral daily  methimazole 5 milliGRAM(s) Oral daily  QUEtiapine 12.5 milliGRAM(s) Oral at bedtime  rOPINIRole 2 milliGRAM(s) Oral at bedtime    MEDICATIONS  (PRN):  acetaminophen     Tablet .. 650 milliGRAM(s) Oral every 6 hours PRN Temp greater or equal to 38C (100.4F), Mild Pain (1 - 3)  diphenhydrAMINE 25 milliGRAM(s) Oral every 4 hours PRN Rash and/or Itching  LORazepam     Tablet 0.5 milliGRAM(s) Oral every 6 hours PRN Anxiety      VITAL SIGNS:  T(F): 98.3 (06-01-22 @ 05:00), Max: 98.7 (05-31-22 @ 13:00)  HR: 73 (06-01-22 @ 05:00) (73 - 75)  BP: 128/66 (06-01-22 @ 05:00) (121/68 - 142/67)  RR: 17 (06-01-22 @ 05:00) (17 - 17)  SpO2: 98% (06-01-22 @ 05:00) (97% - 98%)    I&O's Summary    Daily     Daily     PHYSICAL EXAM:  Gen: Alert, NAD  HEENT: NCAT, conjunctiva clear, sclera anicteric, no erythema or exudates in the oropharynx, mmm  Neck: Supple, no JVD  CV: RRR, S1S2, no m/r/g  Resp: CTAB, normal respiratory effort  Abd: Soft, nontender, nondistended, normal bowel sounds  Ext: no edema, no clubbing or cyanosis  Neuro: AAOx2(name and place)  SKIN: warm, perfused    LABS:    Hgb Trend: 11.4<--, 11.9<--, 12.5<--        Creatinine Trend: 0.67<--, 0.94<--, 0.89<--, 0.77<--, 0.71<--, 0.74<--              CAPILLARY BLOOD GLUCOSE          RADIOLOGY & ADDITIONAL TESTS: Reviewed    Imaging Personally Reviewed:    Consultant(s) Notes Reviewed:      Care Discussed with Consultants/Other Providers:

## 2022-06-01 NOTE — PROGRESS NOTE ADULT - ASSESSMENT
72 yF PMHx vascular Dementia (A+Ox1-2), MDD (hx of SI), HTN, HLD, MS on Plegridy, COPD (not on home O2)  presents from [facility] for unresponsiveness for 10-15 seconds, EEG negative and daughter reports these have happened multiple times without new findings on MRI. Pending tranfer back to [facility] when a bed is available.

## 2022-06-01 NOTE — BH CONSULTATION LIAISON PROGRESS NOTE - NSBHASSESSMENTFT_PSY_ALL_CORE
71 y/o Obese F PMHx Dementia (A+Ox1-2), MDD (hx of SI), T2DM, HTN, HLD, MS, COPD (not on home O2)  presented from [facility] for unresponsiveness. Psych consulted for ongoing follow-up. Pt had de paco period of unresponsiveness at [facility] that warrants medical/neuro evaluations. ECT was being considered but never actually administered. Differential includes medical etiology vs behavioral (psychogenic seizure). Medically cleared after negative evaluations (head imaging, EEG).    Plan:   - Continue usual psychotropics: Prozac 20mg daily, Requip 2mg HS, Seroquel 12.5mg HS, and Ativan 0.5mg PO Q 6PRN anxiety  - Maintain enhanced supervision given impulsive behavior and inability to contract for safety; recommend 7S/Mindful Care Unit if possible  - ECT #2 scheduled for tomorrow Thurs 6/2; please make NPO overnight tonight; please obtain COVID test within 72 hours from next ECT  - Will continue to follow  - Dispo: likely return to [facility] when medically cleared. Possible ECT either here or there.

## 2022-06-01 NOTE — PROGRESS NOTE ADULT - PROBLEM SELECTOR PLAN 3
- CT head 5/18 and 5/25 with Severe chronic microvascular ischemic changes.  - At bedside patient is A&Ox2-3, awake and alert and follows commands, appears to be at baseline  - UA negative, CXR neg, doesn't appear different from baseline documented at [facility] inpt notes

## 2022-06-01 NOTE — PROGRESS NOTE ADULT - PROBLEM SELECTOR PLAN 5
- hx of MS  - wheelchair bound but can transfer and walk a few steps with walker, incontinent   - pt receives Plegridy q 2 weeks - 6/13  - f/u neuro outpatient for same  - s/p interferon on 5/30

## 2022-06-02 DIAGNOSIS — G93.49 OTHER ENCEPHALOPATHY: ICD-10-CM

## 2022-06-02 LAB — SARS-COV-2 RNA SPEC QL NAA+PROBE: SIGNIFICANT CHANGE UP

## 2022-06-02 PROCEDURE — 99232 SBSQ HOSP IP/OBS MODERATE 35: CPT | Mod: GC

## 2022-06-02 PROCEDURE — 99233 SBSQ HOSP IP/OBS HIGH 50: CPT

## 2022-06-02 RX ORDER — SENNA PLUS 8.6 MG/1
2 TABLET ORAL AT BEDTIME
Refills: 0 | Status: DISCONTINUED | OUTPATIENT
Start: 2022-06-02 | End: 2022-06-18

## 2022-06-02 RX ORDER — POLYETHYLENE GLYCOL 3350 17 G/17G
17 POWDER, FOR SOLUTION ORAL DAILY
Refills: 0 | Status: DISCONTINUED | OUTPATIENT
Start: 2022-06-02 | End: 2022-06-18

## 2022-06-02 RX ADMIN — SENNA PLUS 2 TABLET(S): 8.6 TABLET ORAL at 21:47

## 2022-06-02 RX ADMIN — ATORVASTATIN CALCIUM 80 MILLIGRAM(S): 80 TABLET, FILM COATED ORAL at 21:44

## 2022-06-02 RX ADMIN — Medication 2000 UNIT(S): at 12:15

## 2022-06-02 RX ADMIN — POLYETHYLENE GLYCOL 3350 17 GRAM(S): 17 POWDER, FOR SOLUTION ORAL at 21:47

## 2022-06-02 RX ADMIN — Medication 40 MILLIGRAM(S): at 12:16

## 2022-06-02 RX ADMIN — Medication 25 MILLIGRAM(S): at 21:33

## 2022-06-02 RX ADMIN — ROPINIROLE 2 MILLIGRAM(S): 8 TABLET, FILM COATED, EXTENDED RELEASE ORAL at 21:40

## 2022-06-02 RX ADMIN — QUETIAPINE FUMARATE 12.5 MILLIGRAM(S): 200 TABLET, FILM COATED ORAL at 21:44

## 2022-06-02 RX ADMIN — ENOXAPARIN SODIUM 40 MILLIGRAM(S): 100 INJECTION SUBCUTANEOUS at 06:08

## 2022-06-02 NOTE — DIETITIAN INITIAL EVALUATION ADULT - PERTINENT LABORATORY DATA
05-27 Na 135 mmol/L Glu 91 mg/dL K+ 3.9 mmol/L Cr 0.67 mg/dL BUN 15 mg/dL Phos 3.3 mg/dL      A1C with Estimated Average Glucose Result: 5.5 % (05-26-22 @ 01:37)

## 2022-06-02 NOTE — PROGRESS NOTE ADULT - PROBLEM SELECTOR PLAN 2
- Severe episode of recurrent major depressive disorder, without psychotic features was the reason for her admission to [facility]  - Continue Prozac 40 mg daily, Quetiapine 12.5mg qhs

## 2022-06-02 NOTE — BH CONSULTATION LIAISON PROGRESS NOTE - NSBHASSESSMENTFT_PSY_ALL_CORE
71 y/o Obese F PMHx Dementia (A+Ox1-2), MDD (hx of SI), T2DM, HTN, HLD, MS, COPD (not on home O2)  presented from [facility] for unresponsiveness. Psych consulted for ongoing follow-up. Pt had de paco period of unresponsiveness at [facility] that warrants medical/neuro evaluations. ECT was being considered but never actually administered. Differential includes medical etiology vs behavioral (psychogenic seizure). Medically cleared after negative evaluations (head imaging, EEG).    Plan:   - Continue usual psychotropics: Prozac 20mg daily, Requip 2mg HS, Seroquel 12.5mg HS, and Ativan 0.5mg PO Q 6PRN anxiety  - Maintain enhanced supervision given impulsive behavior and inability to contract for safety; recommend 7S/Mindful Care Unit if possible  - ECT #3 to be scheduled for Monday 6/6; please make NPO overnight on Sunday; please obtain COVID test within 72 hours from next ECT  - Will continue to follow  - Dispo: likely return to [facility] when medically cleared. Possible ECT continued either here or there.

## 2022-06-02 NOTE — PROGRESS NOTE ADULT - ASSESSMENT
72 yF PMHx vascular Dementia (A+Ox1-2), MDD (hx of SI), HTN, HLD, MS on Plegridy, COPD (not on home O2)  presents from [facility] for unresponsiveness for 10-15 seconds, EEG negative and daughter reports these have happened multiple times without new findings on MRI. Pending tranfer back to [facility] when a bed is available.

## 2022-06-02 NOTE — DIETITIAN INITIAL EVALUATION ADULT - OTHER INFO
Per chart, 72 yF PMHx vascular Dementia (A+Ox1-2), MDD (hx of SI), HTN, HLD, MS on Plegridy, COPD (not on home O2)  presents from [facility] for unresponsiveness for 10-15 seconds, EEG negative and daughter reports these have happened multiple times without new findings on MRI. Pending tranfer back to [facility] when a bed is available.    Nutrition interview: No recent episodes of nausea, vomiting, diarrhea or constipation, BM noted on 5/29 per RN flowsheets. No bowel regimen in place- will recommend. Denies any chewing/swallowing difficulties. No food allergies. Stated UBW: ~200#, in line with current wt. Food preferences explored and noted. Intake is % per RN flowsheets and per pt. Feeding skills: set up help/ extensive assistance.     No education warranted at present 2/2 pt w/ decreased cognition.

## 2022-06-02 NOTE — PROGRESS NOTE ADULT - PROBLEM SELECTOR PLAN 3
- CT head 5/18 and 5/25 with Severe chronic microvascular ischemic changes.  - At bedside patient is A&Ox2-3, awake and alert and follows commands, appears to be at baseline  - UA negative, CXR neg, doesn't appear different from baseline documented at [facility] inpt notes

## 2022-06-02 NOTE — PROGRESS NOTE ADULT - ATTENDING COMMENTS
72W with PMH MDD with hx of SI and prior attempts, vascular dementia, T2DM, HTN, HLD, MS, COPD who is presenting from [facility] with unresponsive episode PRIOR to her first session of ECT. Per daughter she had been worked up multiple times for identical episodes without being able to identify organic cause.     - pt w/ extensive w/u including MRI and EEG w/o elucidation etiology regarding unresponsive episodes  - currently at baseline mental status  - plan for transfer back to [facility]  - DC planning time: 36 min in coordinating DC plan with patient and team.

## 2022-06-02 NOTE — DIETITIAN INITIAL EVALUATION ADULT - ADD RECOMMEND
1) Continue on Current diet rx  2) Encourage PO intake and honor food preferences as able.   3) provide necessary assistance at meals   4) Continue to Monitor weights, labs, BM's, skin integrity, p.o. intake.   5) Recommend bowel regimen  6) RD to f/u prn.

## 2022-06-02 NOTE — PROGRESS NOTE ADULT - SUBJECTIVE AND OBJECTIVE BOX
Madhav Chaves, PGY1  Pager [phone] [facility] or [phone] [facility]    Patient is a 72y old  Female who presents with a chief complaint of unresponsiveness (01 Jun 2022 07:03)      SUBJECTIVE/INTERVAL EVENTS: Patient seen and examined at bedside.    MEDICATIONS  (STANDING):  atorvastatin 80 milliGRAM(s) Oral at bedtime  cholecalciferol 2000 Unit(s) Oral daily  enoxaparin Injectable 40 milliGRAM(s) SubCutaneous every 24 hours  FLUoxetine 40 milliGRAM(s) Oral daily  hydrochlorothiazide 25 milliGRAM(s) Oral daily  lisinopril 20 milliGRAM(s) Oral daily  methimazole 5 milliGRAM(s) Oral daily  QUEtiapine 12.5 milliGRAM(s) Oral at bedtime  rOPINIRole 2 milliGRAM(s) Oral at bedtime    MEDICATIONS  (PRN):  acetaminophen     Tablet .. 650 milliGRAM(s) Oral every 6 hours PRN Temp greater or equal to 38C (100.4F), Mild Pain (1 - 3)  diphenhydrAMINE 25 milliGRAM(s) Oral every 4 hours PRN Rash and/or Itching  LORazepam     Tablet 0.5 milliGRAM(s) Oral every 6 hours PRN Anxiety      VITAL SIGNS:  T(F): 97.7 (06-02-22 @ 06:30), Max: 98.6 (06-01-22 @ 21:20)  HR: 73 (06-02-22 @ 06:30) (73 - 74)  BP: 103/61 (06-02-22 @ 06:30) (103/61 - 125/69)  RR: 18 (06-02-22 @ 06:30) (18 - 18)  SpO2: 96% (06-02-22 @ 06:30) (96% - 100%)    I&O's Summary    01 Jun 2022 07:01  -  02 Jun 2022 07:00  --------------------------------------------------------  IN: 0 mL / OUT: 1100 mL / NET: -1100 mL      Daily     Daily     PHYSICAL EXAM:  Gen: Alert, NAD  HEENT: NCAT, conjunctiva clear, sclera anicteric, no erythema or exudates in the oropharynx, mmm  Neck: Supple, no JVD  CV: RRR, S1S2, no m/r/g  Resp: CTAB, normal respiratory effort  Abd: Soft, nontender, nondistended, normal bowel sounds  Ext: no edema, no clubbing or cyanosis  Neuro: AOx3, CN2-12 grossly intact, SANTIAGO  SKIN: warm, perfused    LABS:    Hgb Trend: 11.4<--        Creatinine Trend: 0.67<--, 0.94<--, 0.89<--, 0.77<--, 0.71<--, 0.74<--              CAPILLARY BLOOD GLUCOSE          RADIOLOGY & ADDITIONAL TESTS: Reviewed    Imaging Personally Reviewed:    Consultant(s) Notes Reviewed:      Care Discussed with Consultants/Other Providers:   Madhav Chaves, PGY1  Pager [phone] [facility] or [phone] [facility]    Patient is a 72y old  Female who presents with a chief complaint of unresponsiveness (01 Jun 2022 07:03)      SUBJECTIVE/INTERVAL EVENTS:  At ECT    MEDICATIONS  (STANDING):  atorvastatin 80 milliGRAM(s) Oral at bedtime  cholecalciferol 2000 Unit(s) Oral daily  enoxaparin Injectable 40 milliGRAM(s) SubCutaneous every 24 hours  FLUoxetine 40 milliGRAM(s) Oral daily  hydrochlorothiazide 25 milliGRAM(s) Oral daily  lisinopril 20 milliGRAM(s) Oral daily  methimazole 5 milliGRAM(s) Oral daily  QUEtiapine 12.5 milliGRAM(s) Oral at bedtime  rOPINIRole 2 milliGRAM(s) Oral at bedtime    MEDICATIONS  (PRN):  acetaminophen     Tablet .. 650 milliGRAM(s) Oral every 6 hours PRN Temp greater or equal to 38C (100.4F), Mild Pain (1 - 3)  diphenhydrAMINE 25 milliGRAM(s) Oral every 4 hours PRN Rash and/or Itching  LORazepam     Tablet 0.5 milliGRAM(s) Oral every 6 hours PRN Anxiety      VITAL SIGNS:  T(F): 97.7 (06-02-22 @ 06:30), Max: 98.6 (06-01-22 @ 21:20)  HR: 73 (06-02-22 @ 06:30) (73 - 74)  BP: 103/61 (06-02-22 @ 06:30) (103/61 - 125/69)  RR: 18 (06-02-22 @ 06:30) (18 - 18)  SpO2: 96% (06-02-22 @ 06:30) (96% - 100%)    I&O's Summary    01 Jun 2022 07:01  -  02 Jun 2022 07:00  --------------------------------------------------------  IN: 0 mL / OUT: 1100 mL / NET: -1100 mL      Daily     Daily     PHYSICAL EXAM:  At ECT    LABS:    Hgb Trend: 11.4<--        Creatinine Trend: 0.67<--, 0.94<--, 0.89<--, 0.77<--, 0.71<--, 0.74<--              CAPILLARY BLOOD GLUCOSE          RADIOLOGY & ADDITIONAL TESTS: Reviewed    Imaging Personally Reviewed:    Consultant(s) Notes Reviewed:      Care Discussed with Consultants/Other Providers:   Madhav Chaves, PGY1  Pager [phone] [facility] or [phone] [facility]    Patient is a 72y old  Female who presents with a chief complaint of unresponsiveness (01 Jun 2022 07:03)      SUBJECTIVE/INTERVAL EVENTS:  Patient was seen and examined at bedside this morning. Denies any nausea/vomiting/diarrhea, headache, shortness of breath, abdominal pain or chest pain/palpitations. Patient responding appropriately to questions and able to make needs known. Vital signs/imaging/telemetry events reviewed.    No Berry, remarked that she doesn't want to go back to [facility] but doesn't remember why    MEDICATIONS  (STANDING):  atorvastatin 80 milliGRAM(s) Oral at bedtime  cholecalciferol 2000 Unit(s) Oral daily  enoxaparin Injectable 40 milliGRAM(s) SubCutaneous every 24 hours  FLUoxetine 40 milliGRAM(s) Oral daily  hydrochlorothiazide 25 milliGRAM(s) Oral daily  lisinopril 20 milliGRAM(s) Oral daily  methimazole 5 milliGRAM(s) Oral daily  QUEtiapine 12.5 milliGRAM(s) Oral at bedtime  rOPINIRole 2 milliGRAM(s) Oral at bedtime    MEDICATIONS  (PRN):  acetaminophen     Tablet .. 650 milliGRAM(s) Oral every 6 hours PRN Temp greater or equal to 38C (100.4F), Mild Pain (1 - 3)  diphenhydrAMINE 25 milliGRAM(s) Oral every 4 hours PRN Rash and/or Itching  LORazepam     Tablet 0.5 milliGRAM(s) Oral every 6 hours PRN Anxiety      VITAL SIGNS:  T(F): 97.7 (06-02-22 @ 06:30), Max: 98.6 (06-01-22 @ 21:20)  HR: 73 (06-02-22 @ 06:30) (73 - 74)  BP: 103/61 (06-02-22 @ 06:30) (103/61 - 125/69)  RR: 18 (06-02-22 @ 06:30) (18 - 18)  SpO2: 96% (06-02-22 @ 06:30) (96% - 100%)    I&O's Summary    01 Jun 2022 07:01  -  02 Jun 2022 07:00  --------------------------------------------------------  IN: 0 mL / OUT: 1100 mL / NET: -1100 mL      Daily     Daily     PHYSICAL EXAM:  GENERAL: NAD, well-developed  HEENT:  Atraumatic, Normocephalic, EOMI, PERRLA, conjunctiva and sclera clear, oral mucosa moist,    NECK: Supple, No JVD  CHEST/LUNG: Clear to auscultation bilaterally; No wheeze  HEART: RRR; No murmurs, rubs, or gallops  ABDOMEN: Soft, Nontender, Nondistended; Bowel sounds present  EXTREMITIES:  2+ Peripheral Pulses, No clubbing, cyanosis, or edema  PSYCH: AAOx3, normal affect   NEUROLOGY: non-focal, moving all extremities  SKIN: No rashes or lesions    LABS:    Hgb Trend: 11.4<--        Creatinine Trend: 0.67<--, 0.94<--, 0.89<--, 0.77<--, 0.71<--, 0.74<--              CAPILLARY BLOOD GLUCOSE          RADIOLOGY & ADDITIONAL TESTS: Reviewed    Imaging Personally Reviewed:    Consultant(s) Notes Reviewed:      Care Discussed with Consultants/Other Providers:

## 2022-06-02 NOTE — PROGRESS NOTE ADULT - PROBLEM SELECTOR PLAN 1
Presented with unresponsiveness, eye rolling and tongue movements, 10-15 second episode, of note  - c/w atorvastatin 80  - MOLST form documenting Do Not Hospitalize  - 24 hour EEG on 5/26-5/27 Normal  - hold off on MRI per pt daughter/HCP  - Pending discharge to [facility]  - s/p ECT on 6/1 and 6/2  - At this time the patient is medically cleared for discharge

## 2022-06-02 NOTE — DIETITIAN INITIAL EVALUATION ADULT - PERTINENT MEDS FT
MEDICATIONS  (STANDING):  atorvastatin 80 milliGRAM(s) Oral at bedtime  cholecalciferol 2000 Unit(s) Oral daily  enoxaparin Injectable 40 milliGRAM(s) SubCutaneous every 24 hours  FLUoxetine 40 milliGRAM(s) Oral daily  hydrochlorothiazide 25 milliGRAM(s) Oral daily  lisinopril 20 milliGRAM(s) Oral daily  methimazole 5 milliGRAM(s) Oral daily  QUEtiapine 12.5 milliGRAM(s) Oral at bedtime  rOPINIRole 2 milliGRAM(s) Oral at bedtime    MEDICATIONS  (PRN):  acetaminophen     Tablet .. 650 milliGRAM(s) Oral every 6 hours PRN Temp greater or equal to 38C (100.4F), Mild Pain (1 - 3)  diphenhydrAMINE 25 milliGRAM(s) Oral every 4 hours PRN Rash and/or Itching  LORazepam     Tablet 0.5 milliGRAM(s) Oral every 6 hours PRN Anxiety

## 2022-06-02 NOTE — PROGRESS NOTE ADULT - PROBLEM SELECTOR PLAN 7
DVT ppx: Lovenox SQ 40mg QD  GOC - DNR/DNI/DNH- MOLST in chart - comfort measures only  Dispo: [facility]  Diet: Regular

## 2022-06-02 NOTE — DIETITIAN INITIAL EVALUATION ADULT - ORAL INTAKE PTA/DIET HISTORY
Pt in nursing home PTA, appetite reported as good, no dietary restrictions/ supplements/ vitamins PTA.

## 2022-06-03 LAB — SARS-COV-2 RNA SPEC QL NAA+PROBE: SIGNIFICANT CHANGE UP

## 2022-06-03 PROCEDURE — 99232 SBSQ HOSP IP/OBS MODERATE 35: CPT | Mod: GC

## 2022-06-03 PROCEDURE — 99233 SBSQ HOSP IP/OBS HIGH 50: CPT

## 2022-06-03 RX ADMIN — ROPINIROLE 2 MILLIGRAM(S): 8 TABLET, FILM COATED, EXTENDED RELEASE ORAL at 22:07

## 2022-06-03 RX ADMIN — SENNA PLUS 2 TABLET(S): 8.6 TABLET ORAL at 22:06

## 2022-06-03 RX ADMIN — LISINOPRIL 20 MILLIGRAM(S): 2.5 TABLET ORAL at 05:34

## 2022-06-03 RX ADMIN — Medication 40 MILLIGRAM(S): at 11:14

## 2022-06-03 RX ADMIN — QUETIAPINE FUMARATE 12.5 MILLIGRAM(S): 200 TABLET, FILM COATED ORAL at 22:06

## 2022-06-03 RX ADMIN — Medication 25 MILLIGRAM(S): at 05:34

## 2022-06-03 RX ADMIN — ATORVASTATIN CALCIUM 80 MILLIGRAM(S): 80 TABLET, FILM COATED ORAL at 22:06

## 2022-06-03 RX ADMIN — ENOXAPARIN SODIUM 40 MILLIGRAM(S): 100 INJECTION SUBCUTANEOUS at 06:19

## 2022-06-03 RX ADMIN — Medication 2000 UNIT(S): at 11:14

## 2022-06-03 RX ADMIN — POLYETHYLENE GLYCOL 3350 17 GRAM(S): 17 POWDER, FOR SOLUTION ORAL at 11:13

## 2022-06-03 NOTE — PROGRESS NOTE ADULT - SUBJECTIVE AND OBJECTIVE BOX
Madhav Chaves, PGY1  Pager [phone] [facility] or [phone] [facility]    Patient is a 72y old  Female who presents with a chief complaint of Altered mental status     (02 Jun 2022 16:16)      SUBJECTIVE/INTERVAL EVENTS: Patient seen and examined at bedside.    MEDICATIONS  (STANDING):  atorvastatin 80 milliGRAM(s) Oral at bedtime  cholecalciferol 2000 Unit(s) Oral daily  enoxaparin Injectable 40 milliGRAM(s) SubCutaneous every 24 hours  FLUoxetine 40 milliGRAM(s) Oral daily  hydrochlorothiazide 25 milliGRAM(s) Oral daily  lisinopril 20 milliGRAM(s) Oral daily  methimazole 5 milliGRAM(s) Oral daily  polyethylene glycol 3350 17 Gram(s) Oral daily  QUEtiapine 12.5 milliGRAM(s) Oral at bedtime  rOPINIRole 2 milliGRAM(s) Oral at bedtime  senna 2 Tablet(s) Oral at bedtime    MEDICATIONS  (PRN):  acetaminophen     Tablet .. 650 milliGRAM(s) Oral every 6 hours PRN Temp greater or equal to 38C (100.4F), Mild Pain (1 - 3)  diphenhydrAMINE 25 milliGRAM(s) Oral every 4 hours PRN Rash and/or Itching      VITAL SIGNS:  T(F): 97.9 (06-03-22 @ 04:50), Max: 98.4 (06-02-22 @ 12:07)  HR: 64 (06-03-22 @ 04:50) (62 - 71)  BP: 120/72 (06-03-22 @ 04:50) (117/60 - 122/62)  RR: 16 (06-03-22 @ 04:50) (16 - 18)  SpO2: 96% (06-03-22 @ 04:50) (95% - 98%)    I&O's Summary    Daily     Daily     PHYSICAL EXAM:  Gen: Alert, NAD  HEENT: NCAT, conjunctiva clear, sclera anicteric, no erythema or exudates in the oropharynx, mmm  Neck: Supple, no JVD  CV: RRR, S1S2, no m/r/g  Resp: CTAB, normal respiratory effort  Abd: Soft, nontender, nondistended, normal bowel sounds  Ext: no edema, no clubbing or cyanosis  Neuro: AOx3, CN2-12 grossly intact, SANTIAGO  SKIN: warm, perfused    LABS:    Hgb Trend:         Creatinine Trend: 0.67<--, 0.94<--, 0.89<--, 0.77<--, 0.71<--, 0.74<--              CAPILLARY BLOOD GLUCOSE          RADIOLOGY & ADDITIONAL TESTS: Reviewed    Imaging Personally Reviewed:    Consultant(s) Notes Reviewed:      Care Discussed with Consultants/Other Providers:   Madhav Chaves, PGY1  Pager [phone] [facility] or [phone] [facility]    Patient is a 72y old  Female who presents with a chief complaint of Altered mental status     (02 Jun 2022 16:16)      SUBJECTIVE/INTERVAL EVENTS: Patient seen and examined at bedside.  patient says she's feeling off and tired. Otherwise no ha, sob, cp, abd pain at this time    MEDICATIONS  (STANDING):  atorvastatin 80 milliGRAM(s) Oral at bedtime  cholecalciferol 2000 Unit(s) Oral daily  enoxaparin Injectable 40 milliGRAM(s) SubCutaneous every 24 hours  FLUoxetine 40 milliGRAM(s) Oral daily  hydrochlorothiazide 25 milliGRAM(s) Oral daily  lisinopril 20 milliGRAM(s) Oral daily  methimazole 5 milliGRAM(s) Oral daily  polyethylene glycol 3350 17 Gram(s) Oral daily  QUEtiapine 12.5 milliGRAM(s) Oral at bedtime  rOPINIRole 2 milliGRAM(s) Oral at bedtime  senna 2 Tablet(s) Oral at bedtime    MEDICATIONS  (PRN):  acetaminophen     Tablet .. 650 milliGRAM(s) Oral every 6 hours PRN Temp greater or equal to 38C (100.4F), Mild Pain (1 - 3)  diphenhydrAMINE 25 milliGRAM(s) Oral every 4 hours PRN Rash and/or Itching      VITAL SIGNS:  T(F): 97.9 (06-03-22 @ 04:50), Max: 98.4 (06-02-22 @ 12:07)  HR: 64 (06-03-22 @ 04:50) (62 - 71)  BP: 120/72 (06-03-22 @ 04:50) (117/60 - 122/62)  RR: 16 (06-03-22 @ 04:50) (16 - 18)  SpO2: 96% (06-03-22 @ 04:50) (95% - 98%)    I&O's Summary    Daily     Daily     PHYSICAL EXAM:  Gen: Alert, NAD  HEENT: NCAT, conjunctiva clear, sclera anicteric, no erythema or exudates in the oropharynx, mmm  Neck: Supple, no JVD  CV: RRR, S1S2, no m/r/g  Resp: CTAB, normal respiratory effort  Abd: Soft, nontender, nondistended, normal bowel sounds  Ext: no edema, no clubbing or cyanosis  Neuro: AOx3, SANTIAGO  SKIN: warm, perfused    LABS:    Hgb Trend:         Creatinine Trend: 0.67<--, 0.94<--, 0.89<--, 0.77<--, 0.71<--, 0.74<--              CAPILLARY BLOOD GLUCOSE          RADIOLOGY & ADDITIONAL TESTS: Reviewed    Imaging Personally Reviewed:    Consultant(s) Notes Reviewed:      Care Discussed with Consultants/Other Providers:

## 2022-06-03 NOTE — PROGRESS NOTE ADULT - PROBLEM SELECTOR PLAN 3
- CT head 5/18 and 5/25 with Severe chronic microvascular ischemic changes.  - At bedside patient is A&Ox2-3, awake and alert and follows commands, appears to be at baseline  - UA negative, CXR neg, doesn't appear different from baseline documented at [facility] inpt notes

## 2022-06-03 NOTE — PROGRESS NOTE ADULT - ASSESSMENT
72 yF PMHx vascular Dementia (A+Ox1-2), MDD (hx of SI), HTN, HLD, MS on Plegridy, COPD (not on home O2)  presents from [facility] for unresponsiveness for 10-15 seconds, EEG negative and daughter reports these have happened multiple times without new findings on MRI. Pending tranfer back to [facility] when a bed is available.

## 2022-06-03 NOTE — PROGRESS NOTE ADULT - PROBLEM SELECTOR PLAN 1
Presented with unresponsiveness, eye rolling and tongue movements, 10-15 second episode, of note  - c/w atorvastatin 80  - MOLST form documenting Do Not Hospitalize  - 24 hour EEG on 5/26-5/27 Normal  - hold off on MRI per pt daughter/HCP  - Pending discharge to [facility]  - s/p ECT on 6/1 and 6/2  - At this time the patient is medically cleared for discharge  - Plan for ECT on 6/5 per psych and requires COVID within 72hrs and NPO night prior

## 2022-06-03 NOTE — BH CONSULTATION LIAISON PROGRESS NOTE - NSBHASSESSMENTFT_PSY_ALL_CORE
73 y/o Obese F PMHx Dementia (A+Ox1-2), MDD (hx of SI), T2DM, HTN, HLD, MS, COPD (not on home O2)  presented from [facility] for unresponsiveness. Psych consulted for ongoing follow-up. Pt had de paco period of unresponsiveness at [facility] that warrants medical/neuro evaluations. ECT was being considered but never actually administered. Differential includes medical etiology vs behavioral. Medically cleared after negative evaluations (head imaging, EEG).    Plan:   - Continue usual psychotropics: Prozac 20mg daily, Requip 2mg HS, Seroquel 12.5mg HS, and Ativan 0.5mg PO Q 6PRN anxiety  - Maintain enhanced supervision given impulsive behavior and inability to contract for safety; recommend 7S/Mindful Care Unit if possible  - ECT #3 to be scheduled for Monday 6/6; please make NPO overnight on Sunday; please obtain COVID test within 72 hours from next ECT  - Will continue to follow  - Dispo: likely return to [facility] when medically cleared. Possible ECT continued either here or there.

## 2022-06-03 NOTE — PROGRESS NOTE ADULT - ATTENDING COMMENTS
72W with PMH MDD with hx of SI and prior attempts, vascular dementia, T2DM, HTN, HLD, MS, COPD who is presenting from [facility] with unresponsive episode PRIOR to her first session of ECT. Per daughter she had been worked up multiple times for identical episodes without being able to identify organic cause.     - pt w/ extensive w/u including MRI and EEG w/o elucidation etiology regarding unresponsive episodes  - currently at baseline mental status  - plan for transfer back to [facility]  - DC planning time: 36 min in coordinating DC plan with patient and team.

## 2022-06-03 NOTE — PROGRESS NOTE ADULT - PROBLEM SELECTOR PLAN 2
- Severe episode of recurrent major depressive disorder, without psychotic features was the reason for her admission to [facility]  - Continue Prozac 40 mg daily, Quetiapine 12.5mg qhs

## 2022-06-04 PROCEDURE — 99232 SBSQ HOSP IP/OBS MODERATE 35: CPT | Mod: GC

## 2022-06-04 RX ADMIN — LISINOPRIL 20 MILLIGRAM(S): 2.5 TABLET ORAL at 06:08

## 2022-06-04 RX ADMIN — Medication 40 MILLIGRAM(S): at 13:38

## 2022-06-04 RX ADMIN — Medication 2000 UNIT(S): at 13:38

## 2022-06-04 RX ADMIN — POLYETHYLENE GLYCOL 3350 17 GRAM(S): 17 POWDER, FOR SOLUTION ORAL at 13:39

## 2022-06-04 RX ADMIN — QUETIAPINE FUMARATE 12.5 MILLIGRAM(S): 200 TABLET, FILM COATED ORAL at 21:38

## 2022-06-04 RX ADMIN — Medication 25 MILLIGRAM(S): at 06:07

## 2022-06-04 RX ADMIN — ROPINIROLE 2 MILLIGRAM(S): 8 TABLET, FILM COATED, EXTENDED RELEASE ORAL at 21:38

## 2022-06-04 RX ADMIN — ATORVASTATIN CALCIUM 80 MILLIGRAM(S): 80 TABLET, FILM COATED ORAL at 21:38

## 2022-06-04 RX ADMIN — ENOXAPARIN SODIUM 40 MILLIGRAM(S): 100 INJECTION SUBCUTANEOUS at 06:08

## 2022-06-04 RX ADMIN — SENNA PLUS 2 TABLET(S): 8.6 TABLET ORAL at 21:38

## 2022-06-04 NOTE — PROGRESS NOTE ADULT - ASSESSMENT
Retinoid Dermatitis Aggressive Treatment: I recommended more frequent application of Cetaphil or CeraVe to the areas of dermatitis. I also prescribed a topical steroid for twice daily use until the dermatitis resolves. 72 yF PMHx vascular Dementia (A+Ox1-2), MDD (hx of SI), HTN, HLD, MS on Plegridy, COPD (not on home O2)  presents from [facility] for unresponsiveness for 10-15 seconds, EEG negative and daughter reports these have happened multiple times without new findings on MRI. Pending tranfer back to [facility] when a bed is available.

## 2022-06-04 NOTE — PROGRESS NOTE ADULT - PROBLEM SELECTOR PLAN 3
- CT head 5/18 and 5/25 with Severe chronic microvascular ischemic changes.  - At bedside patient is A&Ox2-3, awake and alert and follows commands, appears to be at baseline  - UA negative, CXR neg, doesn't appear different from baseline documented at [facility] inpt notes

## 2022-06-04 NOTE — PROGRESS NOTE ADULT - PROBLEM SELECTOR PLAN 2
- Severe episode of recurrent major depressive disorder, without psychotic features was the reason for her admission to [facility]  - Continue Prozac 40 mg daily, Quetiapine 12.5mg qhs

## 2022-06-04 NOTE — PROGRESS NOTE ADULT - PROBLEM SELECTOR PLAN 1
Presented with unresponsiveness, eye rolling and tongue movements, 10-15 second episode, of note  - c/w atorvastatin 80  - MOLST form documenting Do Not Hospitalize  - 24 hour EEG on 5/26-5/27 Normal  - hold off on MRI per pt daughter/HCP  - Pending discharge to [facility]  - s/p ECT on 6/1 and 6/2  - At this time the patient is medically cleared for discharge  - Plan for ECT on 6/5 per psych, has active COVID screen and made NPO for tonight

## 2022-06-04 NOTE — PROGRESS NOTE ADULT - ATTENDING COMMENTS
patient seen and examined today. no acute complaints. c/w plans as above. patient awaiting placement at [facility].     discussed with residents.

## 2022-06-04 NOTE — PROGRESS NOTE ADULT - SUBJECTIVE AND OBJECTIVE BOX
Madhav Chaves, PGY1  Pager [phone] [facility] or [phone] [facility]    Patient is a 72y old  Female who presents with a chief complaint of unresponsiveness (03 Jun 2022 07:15)      SUBJECTIVE/INTERVAL EVENTS: Patient seen and examined at bedside.  Patient was seen and examined at bedside this morning. Denies any nausea/vomiting/diarrhea, headache, shortness of breath, abdominal pain or chest pain/palpitations. Patient responding appropriately to questions and able to make needs known. Vital signs/imaging/telemetry events reviewed.      MEDICATIONS  (STANDING):  atorvastatin 80 milliGRAM(s) Oral at bedtime  cholecalciferol 2000 Unit(s) Oral daily  enoxaparin Injectable 40 milliGRAM(s) SubCutaneous every 24 hours  FLUoxetine 40 milliGRAM(s) Oral daily  hydrochlorothiazide 25 milliGRAM(s) Oral daily  lisinopril 20 milliGRAM(s) Oral daily  methimazole 5 milliGRAM(s) Oral daily  polyethylene glycol 3350 17 Gram(s) Oral daily  QUEtiapine 12.5 milliGRAM(s) Oral at bedtime  rOPINIRole 2 milliGRAM(s) Oral at bedtime  senna 2 Tablet(s) Oral at bedtime    MEDICATIONS  (PRN):  acetaminophen     Tablet .. 650 milliGRAM(s) Oral every 6 hours PRN Temp greater or equal to 38C (100.4F), Mild Pain (1 - 3)  diphenhydrAMINE 25 milliGRAM(s) Oral every 4 hours PRN Rash and/or Itching      VITAL SIGNS:  T(F): 97.2 (06-04-22 @ 05:54), Max: 98 (06-03-22 @ 21:18)  HR: 70 (06-04-22 @ 05:54) (64 - 70)  BP: 125/71 (06-04-22 @ 05:54) (110/61 - 125/79)  RR: 17 (06-04-22 @ 05:54) (16 - 17)  SpO2: 98% (06-04-22 @ 05:54) (97% - 98%)    I&O's Summary    Daily     Daily     PHYSICAL EXAM:  Gen: Alert, NAD  HEENT: NCAT, conjunctiva clear, sclera anicteric, no erythema or exudates in the oropharynx, mmm  Neck: Supple, no JVD  CV: RRR, S1S2, no m/r/g  Resp: CTAB, normal respiratory effort  Abd: Soft, nontender, nondistended, normal bowel sounds  Ext: no edema, no clubbing or cyanosis  Neuro: AOx3, CN2-12 grossly intact, SANTIAGO  SKIN: warm, perfused    LABS:    Hgb Trend:         Creatinine Trend: 0.67<--, 0.94<--, 0.89<--, 0.77<--, 0.71<--, 0.74<--              CAPILLARY BLOOD GLUCOSE          RADIOLOGY & ADDITIONAL TESTS: Reviewed    Imaging Personally Reviewed:    Consultant(s) Notes Reviewed:      Care Discussed with Consultants/Other Providers:

## 2022-06-04 NOTE — ED BEHAVIORAL HEALTH NOTE - BEHAVIORAL HEALTH NOTE
No  beds at [facility] available for medical Verna F pts on this day due to weekend bed limitations.

## 2022-06-05 LAB — SARS-COV-2 RNA SPEC QL NAA+PROBE: SIGNIFICANT CHANGE UP

## 2022-06-05 PROCEDURE — 99232 SBSQ HOSP IP/OBS MODERATE 35: CPT | Mod: GC

## 2022-06-05 RX ADMIN — QUETIAPINE FUMARATE 12.5 MILLIGRAM(S): 200 TABLET, FILM COATED ORAL at 22:14

## 2022-06-05 RX ADMIN — Medication 40 MILLIGRAM(S): at 11:00

## 2022-06-05 RX ADMIN — ENOXAPARIN SODIUM 40 MILLIGRAM(S): 100 INJECTION SUBCUTANEOUS at 06:03

## 2022-06-05 RX ADMIN — POLYETHYLENE GLYCOL 3350 17 GRAM(S): 17 POWDER, FOR SOLUTION ORAL at 11:00

## 2022-06-05 RX ADMIN — ATORVASTATIN CALCIUM 80 MILLIGRAM(S): 80 TABLET, FILM COATED ORAL at 22:14

## 2022-06-05 RX ADMIN — Medication 2000 UNIT(S): at 11:00

## 2022-06-05 RX ADMIN — SENNA PLUS 2 TABLET(S): 8.6 TABLET ORAL at 22:14

## 2022-06-05 RX ADMIN — Medication 25 MILLIGRAM(S): at 22:33

## 2022-06-05 RX ADMIN — ROPINIROLE 2 MILLIGRAM(S): 8 TABLET, FILM COATED, EXTENDED RELEASE ORAL at 22:15

## 2022-06-05 NOTE — PROGRESS NOTE ADULT - PROBLEM SELECTOR PLAN 1
Presented with unresponsiveness, eye rolling and tongue movements, 10-15 second episode, of note  - c/w atorvastatin 80  - MOLST form documenting Do Not Hospitalize  - 24 hour EEG on 5/26-5/27 Normal  - hold off on MRI per pt daughter/HCP  - Pending discharge to [facility]  - s/p ECT on 6/1 and 6/2  - At this time the patient is medically cleared for discharge  - Plan for ECT on 6/5 per psych, has active COVID screen and made NPO for tonight

## 2022-06-05 NOTE — PROGRESS NOTE ADULT - ASSESSMENT
72 yF PMHx vascular Dementia (A+Ox1-2), MDD (hx of SI), HTN, HLD, MS on Plegridy, COPD (not on home O2)  presents from [facility] for unresponsiveness for 10-15 seconds, EEG negative and daughter reports these have happened multiple times without new findings on MRI. Pending tranfer back to [facility] when a bed is available.

## 2022-06-05 NOTE — PROGRESS NOTE ADULT - ATTENDING COMMENTS
patient has no acute complaints. medically stable. vitals reviewed. ctm awaiting [facility] bed.    discuss with resident.

## 2022-06-05 NOTE — PROGRESS NOTE ADULT - PROBLEM SELECTOR PLAN 3
- CT head 5/18 and 5/25 with Severe chronic microvascular ischemic changes.  - At bedside patient is A&Ox2-3, awake and alert and follows commands, appears to be at baseline  - UA negative, CXR neg, doesn't appear different from baseline documented at [facility] inpt notes

## 2022-06-05 NOTE — PROGRESS NOTE ADULT - SUBJECTIVE AND OBJECTIVE BOX
Dallin Up MD, PGY-3  Internal Medicine  NS: [phone]//[facility]: [id number]  on TEAMS    Patient is a 72y old  Female who presents with a chief complaint of unresponsiveness (03 Jun 2022 07:15)      SUBJECTIVE/INTERVAL EVENTS: [place] o/n. No complaints      MEDICATIONS  (STANDING):  atorvastatin 80 milliGRAM(s) Oral at bedtime  cholecalciferol 2000 Unit(s) Oral daily  enoxaparin Injectable 40 milliGRAM(s) SubCutaneous every 24 hours  FLUoxetine 40 milliGRAM(s) Oral daily  hydrochlorothiazide 25 milliGRAM(s) Oral daily  lisinopril 20 milliGRAM(s) Oral daily  methimazole 5 milliGRAM(s) Oral daily  polyethylene glycol 3350 17 Gram(s) Oral daily  QUEtiapine 12.5 milliGRAM(s) Oral at bedtime  rOPINIRole 2 milliGRAM(s) Oral at bedtime  senna 2 Tablet(s) Oral at bedtime    MEDICATIONS  (PRN):  acetaminophen     Tablet .. 650 milliGRAM(s) Oral every 6 hours PRN Temp greater or equal to 38C (100.4F), Mild Pain (1 - 3)  diphenhydrAMINE 25 milliGRAM(s) Oral every 4 hours PRN Rash and/or Itching      VITAL SIGNS:  Vital Signs Last 24 Hrs  T(C): 36.3 (05 Jun 2022 05:52), Max: 36.7 (04 Jun 2022 12:32)  T(F): 97.4 (05 Jun 2022 05:52), Max: 98.1 (04 Jun 2022 12:32)  HR: 69 (05 Jun 2022 05:52) (67 - 69)  BP: 97/56 (05 Jun 2022 05:52) (97/56 - 117/60)  BP(mean): --  RR: 16 (05 Jun 2022 05:52) (16 - 17)  SpO2: 97% (05 Jun 2022 05:52) (97% - 98%)    I&O's Summary    Daily     Daily     PHYSICAL EXAM:  Gen: Alert, NAD  HEENT: NCAT, conjunctiva clear, sclera anicteric, no erythema or exudates in the oropharynx, mmm  Neck: Supple, no JVD  CV: RRR, S1S2, no m/r/g  Resp: CTAB, normal respiratory effort  Abd: Soft, nontender, nondistended, normal bowel sounds  Ext: no edema, no clubbing or cyanosis  Neuro: AOx3, CN2-12 grossly intact, SANTIGAO  SKIN: warm, perfused    LABS:    LABS:  cret                    Hgb Trend:         Creatinine Trend: 0.67<--, 0.94<--, 0.89<--, 0.77<--, 0.71<--, 0.74<--              CAPILLARY BLOOD GLUCOSE          RADIOLOGY & ADDITIONAL TESTS: Reviewed    Imaging Personally Reviewed:    Consultant(s) Notes Reviewed:      Care Discussed with Consultants/Other Providers:

## 2022-06-05 NOTE — PROGRESS NOTE ADULT - PROBLEM SELECTOR PLAN 2
- Severe episode of recurrent major depressive disorder, without psychotic features was the reason for her admission to [facility]  - Continue Prozac 40 mg daily, Quetiapine 12.5mg qhs

## 2022-06-05 NOTE — DISCHARGE NOTE NURSING/CASE MANAGEMENT/SOCIAL WORK - NSDCPELOVENOXREACT_GEN_ALL_CORE
Problem: Pain  Goal: #Acceptable pain level achieved/maintained at rest using NRS/Faces  Description: This goal is used for patients who can self-report.  Acceptable means the level is at or below the identified comfort/function goal.  Outcome: Outcome Met, Continue evaluating goal progress toward completion     Problem: At Risk for Falls  Goal: # Patient does not fall  Outcome: Outcome Met, Continue evaluating goal progress toward completion     Problem: At Risk for Falls  Goal: # Takes action to control fall-related risks  Outcome: Outcome Met, Continue evaluating goal progress toward completion      Enoxaparin/Lovenox increases your risk for bleeding. Notify your doctor if you experience any of the following side effects: unusual bleeding or bruising, coughing up or vomiting blood, red or black stool, blood in your urine, itching or hives, chest tightness, chest pain, shortness of breath, trouble breathing, swelling in your face or hands, swelling in your mouth or throat, fever, large flat blue or purplish patches on the skin, numbness or weakness in your arm or leg on one side, pain in your lower leg, sudden or severe headache with vision, speech or walking problems. When Enoxaparin/Lovenox is taken with other medicines, they can affect how it works. Taking other medications such as aspirin, blood thinners, and nonsteroidal anti-inflammatories increases your risk of bleeding. It is very important to tell your health care provider about all of the other medicines, including over-the-counter medications, herbs, and vitamins you are taking. DO NOT start, stop, or change the dosage of any medicine, including over-the-counter medicines, vitamins, and herbal products without your doctor’s approval. Any products containing aspirin or are nonsteroidal anti-inflammatories lessen the blood’s ability to form clots and adds to the effect of Enoxaparin/Lovenox. Never take aspirin or medicines that contain aspirin without speaking to your doctor.

## 2022-06-06 PROCEDURE — 99232 SBSQ HOSP IP/OBS MODERATE 35: CPT | Mod: GC

## 2022-06-06 PROCEDURE — 99233 SBSQ HOSP IP/OBS HIGH 50: CPT

## 2022-06-06 PROCEDURE — 90870 ELECTROCONVULSIVE THERAPY: CPT

## 2022-06-06 RX ADMIN — Medication 2000 UNIT(S): at 13:07

## 2022-06-06 RX ADMIN — QUETIAPINE FUMARATE 12.5 MILLIGRAM(S): 200 TABLET, FILM COATED ORAL at 22:20

## 2022-06-06 RX ADMIN — ATORVASTATIN CALCIUM 80 MILLIGRAM(S): 80 TABLET, FILM COATED ORAL at 22:20

## 2022-06-06 RX ADMIN — Medication 40 MILLIGRAM(S): at 13:06

## 2022-06-06 RX ADMIN — ROPINIROLE 2 MILLIGRAM(S): 8 TABLET, FILM COATED, EXTENDED RELEASE ORAL at 22:20

## 2022-06-06 RX ADMIN — POLYETHYLENE GLYCOL 3350 17 GRAM(S): 17 POWDER, FOR SOLUTION ORAL at 13:08

## 2022-06-06 RX ADMIN — ENOXAPARIN SODIUM 40 MILLIGRAM(S): 100 INJECTION SUBCUTANEOUS at 13:08

## 2022-06-06 RX ADMIN — SENNA PLUS 2 TABLET(S): 8.6 TABLET ORAL at 22:21

## 2022-06-06 NOTE — PROGRESS NOTE ADULT - PROBLEM SELECTOR PLAN 3
- CT head 5/18 and 5/25 with Severe chronic microvascular ischemic changes.  - At bedside patient is A&Ox2-3, awake and alert and follows commands, appears to be at baseline  - UA negative, CXR neg, doesn't appear different from baseline documented at [facility] inpt notes

## 2022-06-06 NOTE — PROGRESS NOTE ADULT - PROBLEM SELECTOR PLAN 2
- Severe episode of recurrent major depressive disorder, without psychotic features was the reason for her admission to [facility]  - Continue Prozac 40 mg daily, Quetiapine 12.5mg qhs

## 2022-06-06 NOTE — BH CONSULTATION LIAISON PROGRESS NOTE - NSBHASSESSMENTFT_PSY_ALL_CORE
73 y/o Obese F PMHx Dementia (A+Ox1-2), MDD (hx of SI), T2DM, HTN, HLD, MS, COPD (not on home O2)  presented from [facility] for unresponsiveness. Psych consulted for ongoing follow-up. Pt had de paco period of unresponsiveness at [facility] that warrants medical/neuro evaluations. ECT was being considered but never actually administered. Differential includes medical etiology vs behavioral. Medically cleared after negative evaluations (head imaging, EEG).    Plan:   - Continue usual psychotropics: Prozac 20mg daily, Requip 2mg HS, Seroquel 12.5mg HS, and Ativan 0.5mg PO Q 6PRN anxiety  - Maintain enhanced supervision given impulsive behavior and inability to contract for safety; recommend 7S/Mindful Care Unit if possible  - ECT #4 to be scheduled for Wednesday 6/8; please make NPO overnight on night prior; please obtain COVID test within 72 hours from next ECT  - Will continue to follow  - Dispo: likely return to [facility] when medically cleared. Possible ECT continued either here or there.  Routine observation

## 2022-06-06 NOTE — PROGRESS NOTE ADULT - ASSESSMENT
72 yF PMHx vascular Dementia (A+Ox1-2), MDD (hx of SI), HTN, HLD, MS on Plegridy, COPD (not on home O2)  presents from [facility] for unresponsiveness for 10-15 seconds, EEG negative and daughter reports these have happened multiple times without new findings on MRI. Pending tranfer back to [facility] when a bed is available.

## 2022-06-06 NOTE — PROGRESS NOTE ADULT - ATTENDING COMMENTS
72W with PMH MDD with hx of SI and prior attempts, vascular dementia, T2DM, HTN, HLD, MS, COPD who is presenting from [facility] with unresponsive episode PRIOR to her first session of ECT. Per daughter she had been worked up multiple times for identical episodes without being able to identify organic cause.     - pt w/ extensive w/u including MRI and EEG w/o elucidation etiology regarding unresponsive episodes  - currently at baseline mental status  - plan for transfer back to [facility]  - DC planning time: 36 min in coordinating DC plan with patient and team.

## 2022-06-06 NOTE — PROGRESS NOTE ADULT - PROBLEM SELECTOR PLAN 1
Presented with unresponsiveness, eye rolling and tongue movements, 10-15 second episode, of note  - c/w atorvastatin 80  - MOLST form documenting Do Not Hospitalize  - 24 hour EEG on 5/26-5/27 Normal  - hold off on MRI per pt daughter/HCP  - Pending discharge to [facility]  - s/p ECT on 6/1 and 6/2  - At this time the patient is medically cleared for discharge  - plan for ECT on 6/6

## 2022-06-06 NOTE — PROGRESS NOTE ADULT - SUBJECTIVE AND OBJECTIVE BOX
Madhav Chaves, PGY1  Pager [phone] [facility] or [phone] [facility]    Patient is a 72y old  Female who presents with a chief complaint of unresponsiveness (05 Jun 2022 06:29)      SUBJECTIVE/INTERVAL EVENTS: Patient seen and examined at bedside.  Patient was seen and examined at bedside this morning. Denies any nausea/vomiting/diarrhea, headache, shortness of breath, abdominal pain or chest pain/palpitations. Patient responding appropriately to questions and able to make needs known. Vital signs/imaging/telemetry events reviewed.      MEDICATIONS  (STANDING):  atorvastatin 80 milliGRAM(s) Oral at bedtime  cholecalciferol 2000 Unit(s) Oral daily  enoxaparin Injectable 40 milliGRAM(s) SubCutaneous every 24 hours  FLUoxetine 40 milliGRAM(s) Oral daily  hydrochlorothiazide 25 milliGRAM(s) Oral daily  lisinopril 20 milliGRAM(s) Oral daily  methimazole 5 milliGRAM(s) Oral daily  polyethylene glycol 3350 17 Gram(s) Oral daily  QUEtiapine 12.5 milliGRAM(s) Oral at bedtime  rOPINIRole 2 milliGRAM(s) Oral at bedtime  senna 2 Tablet(s) Oral at bedtime    MEDICATIONS  (PRN):  acetaminophen     Tablet .. 650 milliGRAM(s) Oral every 6 hours PRN Temp greater or equal to 38C (100.4F), Mild Pain (1 - 3)  diphenhydrAMINE 25 milliGRAM(s) Oral every 4 hours PRN Rash and/or Itching      VITAL SIGNS:  T(F): 98.8 (06-06-22 @ 05:19), Max: 98.8 (06-06-22 @ 05:19)  HR: 66 (06-06-22 @ 05:19) (66 - 70)  BP: 123/56 (06-06-22 @ 05:19) (110/60 - 123/56)  RR: 16 (06-06-22 @ 05:19) (16 - 18)  SpO2: 98% (06-06-22 @ 05:19) (98% - 98%)    I&O's Summary    Daily     Daily     PHYSICAL EXAM:  Gen: Alert, NAD  HEENT: NCAT, conjunctiva clear, sclera anicteric, no erythema or exudates in the oropharynx, mmm  Neck: Supple, no JVD  CV: RRR, S1S2, no m/r/g  Resp: CTAB, normal respiratory effort  Abd: Soft, nontender, nondistended, normal bowel sounds  Ext: no edema, no clubbing or cyanosis  Neuro: AOx3, CN2-12 grossly intact, SANTIAGO  SKIN: warm, perfused    LABS:    Hgb Trend:         Creatinine Trend: 0.67<--, 0.94<--, 0.89<--, 0.77<--, 0.71<--, 0.74<--              CAPILLARY BLOOD GLUCOSE          RADIOLOGY & ADDITIONAL TESTS: Reviewed    Imaging Personally Reviewed:    Consultant(s) Notes Reviewed:      Care Discussed with Consultants/Other Providers:

## 2022-06-07 LAB — SARS-COV-2 RNA SPEC QL NAA+PROBE: SIGNIFICANT CHANGE UP

## 2022-06-07 PROCEDURE — 99233 SBSQ HOSP IP/OBS HIGH 50: CPT

## 2022-06-07 PROCEDURE — 99232 SBSQ HOSP IP/OBS MODERATE 35: CPT | Mod: GC

## 2022-06-07 PROCEDURE — 90870 ELECTROCONVULSIVE THERAPY: CPT

## 2022-06-07 RX ADMIN — Medication 40 MILLIGRAM(S): at 11:45

## 2022-06-07 RX ADMIN — SENNA PLUS 2 TABLET(S): 8.6 TABLET ORAL at 22:43

## 2022-06-07 RX ADMIN — ENOXAPARIN SODIUM 40 MILLIGRAM(S): 100 INJECTION SUBCUTANEOUS at 06:12

## 2022-06-07 RX ADMIN — Medication 650 MILLIGRAM(S): at 11:45

## 2022-06-07 RX ADMIN — QUETIAPINE FUMARATE 12.5 MILLIGRAM(S): 200 TABLET, FILM COATED ORAL at 22:45

## 2022-06-07 RX ADMIN — ATORVASTATIN CALCIUM 80 MILLIGRAM(S): 80 TABLET, FILM COATED ORAL at 22:43

## 2022-06-07 RX ADMIN — Medication 2000 UNIT(S): at 11:45

## 2022-06-07 RX ADMIN — ROPINIROLE 2 MILLIGRAM(S): 8 TABLET, FILM COATED, EXTENDED RELEASE ORAL at 22:43

## 2022-06-07 RX ADMIN — Medication 650 MILLIGRAM(S): at 12:30

## 2022-06-07 NOTE — PROGRESS NOTE ADULT - ASSESSMENT
72 yF PMHx vascular Dementia (A+Ox1-2), MDD (hx of SI), HTN, HLD, MS on Plegridy, COPD (not on home O2)  presents from [facility] for unresponsiveness for 10-15 seconds, EEG negative and daughter reports these have happened multiple times without new findings on MRI. Pending tranfer back to [facility] when a bed is available.

## 2022-06-07 NOTE — PROGRESS NOTE ADULT - PROBLEM SELECTOR PLAN 3
- CT head 5/18 and 5/25 with Severe chronic microvascular ischemic changes.  - At bedside patient is A&Ox2-3, awake and alert and follows commands, appears to be at baseline  - UA negative, CXR neg, doesn't appear different from baseline documented at [facility] inpt notes

## 2022-06-07 NOTE — PROGRESS NOTE ADULT - PROBLEM SELECTOR PLAN 2
- Severe episode of recurrent major depressive disorder, without psychotic features was the reason for her admission to [facility]  - Continue Prozac 40 mg daily, Quetiapine 12.5mg qhs

## 2022-06-07 NOTE — PROGRESS NOTE ADULT - PROBLEM SELECTOR PLAN 1
Presented with unresponsiveness, eye rolling and tongue movements, 10-15 second episode, of note  - c/w atorvastatin 80  - MOLST form documenting Do Not Hospitalize  - 24 hour EEG on 5/26-5/27 Normal  - hold off on MRI per pt daughter/HCP  - Pending discharge to [facility]  - s/p ECT on 6/1 and 6/2  - At this time the patient is medically cleared for discharge  - plan for ECT on 6/6

## 2022-06-07 NOTE — PROGRESS NOTE ADULT - ATTENDING COMMENTS
72W with PMH MDD with hx of SI and prior attempts, vascular dementia, T2DM, HTN, HLD, MS, COPD who is presenting from [facility] with unresponsive episode PRIOR to her first session of ECT. Per daughter she had been worked up multiple times for identical episodes without being able to identify organic cause.     - pt w/ extensive w/u including MRI and EEG w/o elucidation etiology regarding unresponsive episodes  - currently at baseline mental status  - plan for transfer back to [facility]  - DC planning time: 36 min in coordinating DC plan with patient and team.

## 2022-06-07 NOTE — PROGRESS NOTE ADULT - SUBJECTIVE AND OBJECTIVE BOX
Madhav Chaves, PGY1  Pager [phone] [facility] or [phone] [facility]    Patient is a 72y old  Female who presents with a chief complaint of unresponsiveness (06 Jun 2022 07:15)      SUBJECTIVE/INTERVAL EVENTS: Patient seen and examined at bedside.  Patient was seen and examined at bedside this morning. Denies any nausea/vomiting/diarrhea, headache, shortness of breath, abdominal pain or chest pain/palpitations. Patient responding appropriately to questions and able to make needs known. Vital signs/imaging/telemetry events reviewed.      MEDICATIONS  (STANDING):  atorvastatin 80 milliGRAM(s) Oral at bedtime  cholecalciferol 2000 Unit(s) Oral daily  enoxaparin Injectable 40 milliGRAM(s) SubCutaneous every 24 hours  FLUoxetine 40 milliGRAM(s) Oral daily  hydrochlorothiazide 25 milliGRAM(s) Oral daily  lisinopril 20 milliGRAM(s) Oral daily  methimazole 5 milliGRAM(s) Oral daily  polyethylene glycol 3350 17 Gram(s) Oral daily  QUEtiapine 12.5 milliGRAM(s) Oral at bedtime  rOPINIRole 2 milliGRAM(s) Oral at bedtime  senna 2 Tablet(s) Oral at bedtime    MEDICATIONS  (PRN):  acetaminophen     Tablet .. 650 milliGRAM(s) Oral every 6 hours PRN Temp greater or equal to 38C (100.4F), Mild Pain (1 - 3)  diphenhydrAMINE 25 milliGRAM(s) Oral every 4 hours PRN Rash and/or Itching      VITAL SIGNS:  T(F): 98 (06-07-22 @ 05:25), Max: 98.2 (06-06-22 @ 21:51)  HR: 74 (06-07-22 @ 05:25) (66 - 76)  BP: 107/73 (06-07-22 @ 05:25) (107/73 - 117/65)  RR: 17 (06-07-22 @ 05:25) (17 - 18)  SpO2: 98% (06-07-22 @ 05:25) (98% - 100%)    I&O's Summary    06 Jun 2022 07:01  -  07 Jun 2022 07:00  --------------------------------------------------------  IN: 0 mL / OUT: 600 mL / NET: -600 mL      Daily     Daily     PHYSICAL EXAM:  Gen: Alert, NAD  HEENT: NCAT, conjunctiva clear, sclera anicteric, no erythema or exudates in the oropharynx, mmm  Neck: Supple, no JVD  CV: RRR, S1S2, no m/r/g  Resp: CTAB, normal respiratory effort  Abd: Soft, nontender, nondistended, normal bowel sounds  Ext: no edema, no clubbing or cyanosis  Neuro: AOx3, CN2-12 grossly intact, SANTIAGO  SKIN: warm, perfused    LABS:    Hgb Trend:         Creatinine Trend: 0.67<--, 0.94<--, 0.89<--, 0.77<--, 0.71<--, 0.74<--              CAPILLARY BLOOD GLUCOSE          RADIOLOGY & ADDITIONAL TESTS: Reviewed    Imaging Personally Reviewed:    Consultant(s) Notes Reviewed:      Care Discussed with Consultants/Other Providers:

## 2022-06-07 NOTE — BH CONSULTATION LIAISON PROGRESS NOTE - NSBHASSESSMENTFT_PSY_ALL_CORE
71 y/o Obese F PMHx Dementia (A+Ox1-2), MDD (hx of SI), T2DM, HTN, HLD, MS, COPD (not on home O2)  presented from [facility] for unresponsiveness. Psych consulted for ongoing follow-up. Pt had de paco period of unresponsiveness at [facility] that warrants medical/neuro evaluations. ECT was being considered but never actually administered. Differential includes medical etiology vs behavioral. Medically cleared after negative evaluations (head imaging, EEG).    Plan:   - Continue usual psychotropics: Prozac 20mg daily, Requip 2mg HS, Seroquel 12.5mg HS, and Ativan 0.5mg PO Q 6PRN anxiety  - Maintain enhanced supervision given impulsive behavior and inability to contract for safety; recommend 7S/Mindful Care Unit if possible  - ECT #4 to be scheduled for Wednesday 6/8; please make NPO overnight on night prior; please obtain COVID test within 72 hours from next ECT  - Will continue to follow  - Dispo: likely return to [facility] when medically cleared. Possible ECT continued either here or there.

## 2022-06-08 PROCEDURE — 99233 SBSQ HOSP IP/OBS HIGH 50: CPT

## 2022-06-08 PROCEDURE — 99232 SBSQ HOSP IP/OBS MODERATE 35: CPT | Mod: GC

## 2022-06-08 RX ADMIN — Medication 25 MILLIGRAM(S): at 05:26

## 2022-06-08 RX ADMIN — LISINOPRIL 20 MILLIGRAM(S): 2.5 TABLET ORAL at 05:26

## 2022-06-08 RX ADMIN — Medication 2000 UNIT(S): at 12:20

## 2022-06-08 RX ADMIN — ROPINIROLE 2 MILLIGRAM(S): 8 TABLET, FILM COATED, EXTENDED RELEASE ORAL at 21:48

## 2022-06-08 RX ADMIN — QUETIAPINE FUMARATE 12.5 MILLIGRAM(S): 200 TABLET, FILM COATED ORAL at 21:47

## 2022-06-08 RX ADMIN — POLYETHYLENE GLYCOL 3350 17 GRAM(S): 17 POWDER, FOR SOLUTION ORAL at 12:22

## 2022-06-08 RX ADMIN — ENOXAPARIN SODIUM 40 MILLIGRAM(S): 100 INJECTION SUBCUTANEOUS at 05:27

## 2022-06-08 RX ADMIN — SENNA PLUS 2 TABLET(S): 8.6 TABLET ORAL at 21:47

## 2022-06-08 RX ADMIN — Medication 40 MILLIGRAM(S): at 12:22

## 2022-06-08 RX ADMIN — ATORVASTATIN CALCIUM 80 MILLIGRAM(S): 80 TABLET, FILM COATED ORAL at 21:47

## 2022-06-08 NOTE — PROGRESS NOTE ADULT - ASSESSMENT
72 yF PMHx vascular Dementia (A+Ox1-2), MDD (hx of SI), HTN, HLD, MS on Plegridy, COPD (not on home O2)  presents from [facility] for unresponsiveness for 10-15 seconds, EEG negative and daughter reports these have happened multiple times without new findings on MRI. Pending tranfer back to [facility] when a bed is available.

## 2022-06-08 NOTE — BH CONSULTATION LIAISON PROGRESS NOTE - NSBHASSESSMENTFT_PSY_ALL_CORE
73 y/o Obese F PMHx Dementia (A+Ox1-2), MDD (hx of SI), T2DM, HTN, HLD, MS, COPD (not on home O2)  presented from [facility] for unresponsiveness. Psych consulted for ongoing follow-up. Pt had de paco period of unresponsiveness at [facility] that warrants medical/neuro evaluations. ECT was being considered but never actually administered. Differential includes medical etiology vs behavioral. Medically cleared after negative evaluations (head imaging, EEG).    Plan:   - Continue usual psychotropics: Prozac 20mg daily, Requip 2mg HS, Seroquel 12.5mg HS, and Ativan 0.5mg PO Q 6PRN anxiety  - Maintain enhanced supervision given impulsive behavior and inability to contract for safety; recommend 7S/Mindful Care Unit if possible  - ECT #5 to be scheduled for Friday 6/10; please make NPO overnight on night prior; please obtain COVID test within 72 hours from next ECT  - Will continue to follow  - Dispo: likely return to [facility] when medically cleared. Possible ECT continued either here or there.

## 2022-06-08 NOTE — PROGRESS NOTE ADULT - PROBLEM SELECTOR PLAN 1
Presented with unresponsiveness, eye rolling and tongue movements, 10-15 second episode, of note  - c/w atorvastatin 80  - MOLST form documenting Do Not Hospitalize  - 24 hour EEG on 5/26-5/27 Normal  - hold off on MRI per pt daughter/HCP  - Pending discharge to [facility]  - s/p ECT on 6/1 and 6/2  - At this time the patient is medically cleared for discharge  - plan for ECT on 6/8

## 2022-06-08 NOTE — PROGRESS NOTE ADULT - PROBLEM SELECTOR PLAN 2
- Severe episode of recurrent major depressive disorder, without psychotic features was the reason for her admission to [facility]  - Continue Prozac 40 mg daily, Quetiapine 12.5mg qhs

## 2022-06-08 NOTE — PROGRESS NOTE ADULT - PROBLEM SELECTOR PLAN 3
- CT head 5/18 and 5/25 with Severe chronic microvascular ischemic changes.  - At bedside patient is A&Ox2-3, awake and alert and follows commands, appears to be at baseline  - UA negative, CXR neg, doesn't appear different from baseline documented at [facility] inpt notes

## 2022-06-08 NOTE — PROGRESS NOTE ADULT - ATTENDING COMMENTS
Patient seen and examined at bedside. Pending [facility] bed. Medically stable. DC planning time: 36 min in coordinating DC plan with patient and team.

## 2022-06-08 NOTE — PROGRESS NOTE ADULT - SUBJECTIVE AND OBJECTIVE BOX
Madhav Chaves, PGY1  Pager [phone] [facility] or [phone] [facility]    Patient is a 72y old  Female who presents with a chief complaint of unresponsiveness (07 Jun 2022 07:15)      SUBJECTIVE/INTERVAL EVENTS: Patient seen and examined at bedside.  Patient was seen and examined at bedside this morning. Denies any nausea/vomiting/diarrhea, headache, shortness of breath, abdominal pain or chest pain/palpitations. Patient responding appropriately to questions and able to make needs known. Vital signs/imaging/telemetry events reviewed.      MEDICATIONS  (STANDING):  atorvastatin 80 milliGRAM(s) Oral at bedtime  cholecalciferol 2000 Unit(s) Oral daily  enoxaparin Injectable 40 milliGRAM(s) SubCutaneous every 24 hours  FLUoxetine 40 milliGRAM(s) Oral daily  hydrochlorothiazide 25 milliGRAM(s) Oral daily  lisinopril 20 milliGRAM(s) Oral daily  methimazole 5 milliGRAM(s) Oral daily  polyethylene glycol 3350 17 Gram(s) Oral daily  QUEtiapine 12.5 milliGRAM(s) Oral at bedtime  rOPINIRole 2 milliGRAM(s) Oral at bedtime  senna 2 Tablet(s) Oral at bedtime    MEDICATIONS  (PRN):  acetaminophen     Tablet .. 650 milliGRAM(s) Oral every 6 hours PRN Temp greater or equal to 38C (100.4F), Mild Pain (1 - 3)  diphenhydrAMINE 25 milliGRAM(s) Oral every 4 hours PRN Rash and/or Itching      VITAL SIGNS:  T(F): 97.5 (06-08-22 @ 05:23), Max: 98.3 (06-07-22 @ 22:08)  HR: 72 (06-08-22 @ 05:23) (63 - 72)  BP: 125/74 (06-08-22 @ 05:23) (114/61 - 149/77)  RR: 18 (06-08-22 @ 05:23) (18 - 18)  SpO2: 99% (06-08-22 @ 05:23) (98% - 99%)    I&O's Summary    Daily     Daily     PHYSICAL EXAM:  Gen: Alert, NAD  HEENT: NCAT, conjunctiva clear, sclera anicteric, no erythema or exudates in the oropharynx, mmm  Neck: Supple, no JVD  CV: RRR, S1S2, no m/r/g  Resp: CTAB, normal respiratory effort  Abd: Soft, nontender, nondistended, normal bowel sounds  Ext: no edema, no clubbing or cyanosis  Neuro: AOx3, CN2-12 grossly intact, SANTIAGO  SKIN: warm, perfused    LABS:    Hgb Trend:         Creatinine Trend: 0.67<--, 0.94<--, 0.89<--, 0.77<--, 0.71<--, 0.74<--              CAPILLARY BLOOD GLUCOSE          RADIOLOGY & ADDITIONAL TESTS: Reviewed    Imaging Personally Reviewed:    Consultant(s) Notes Reviewed:      Care Discussed with Consultants/Other Providers:

## 2022-06-09 PROCEDURE — 99233 SBSQ HOSP IP/OBS HIGH 50: CPT

## 2022-06-09 PROCEDURE — 99232 SBSQ HOSP IP/OBS MODERATE 35: CPT | Mod: GC

## 2022-06-09 RX ADMIN — ROPINIROLE 2 MILLIGRAM(S): 8 TABLET, FILM COATED, EXTENDED RELEASE ORAL at 21:10

## 2022-06-09 RX ADMIN — SENNA PLUS 2 TABLET(S): 8.6 TABLET ORAL at 21:10

## 2022-06-09 RX ADMIN — Medication 2000 UNIT(S): at 11:40

## 2022-06-09 RX ADMIN — QUETIAPINE FUMARATE 12.5 MILLIGRAM(S): 200 TABLET, FILM COATED ORAL at 21:10

## 2022-06-09 RX ADMIN — Medication 40 MILLIGRAM(S): at 11:41

## 2022-06-09 RX ADMIN — ATORVASTATIN CALCIUM 80 MILLIGRAM(S): 80 TABLET, FILM COATED ORAL at 21:10

## 2022-06-09 RX ADMIN — ENOXAPARIN SODIUM 40 MILLIGRAM(S): 100 INJECTION SUBCUTANEOUS at 05:33

## 2022-06-09 RX ADMIN — Medication 25 MILLIGRAM(S): at 05:33

## 2022-06-09 RX ADMIN — LISINOPRIL 20 MILLIGRAM(S): 2.5 TABLET ORAL at 05:32

## 2022-06-09 NOTE — PROGRESS NOTE ADULT - ASSESSMENT
72 yF PMHx vascular Dementia (A+Ox1-2), MDD (hx of SI), HTN, HLD, MS on Plegridy, COPD (not on home O2)  presents from [facility] for unresponsiveness for 10-15 seconds, EEG negative and daughter reports these have happened multiple times without new findings on MRI. Pending tranfer back to [facility] when a bed is available.

## 2022-06-09 NOTE — PROGRESS NOTE ADULT - PROBLEM SELECTOR PLAN 2
- Severe episode of recurrent major depressive disorder, without psychotic features was the reason for her admission to [facility]  - Continue Prozac 40 mg daily, Quetiapine 12.5mg qhs

## 2022-06-09 NOTE — PROGRESS NOTE ADULT - ATTENDING COMMENTS
Patient seen and examined at bedside. D/w  at bedside. Medically stable. Plan for d/c to [facility] pending bed. DC planning time: 36 min in coordinating DC plan with patient and team.

## 2022-06-09 NOTE — PROGRESS NOTE ADULT - PROBLEM SELECTOR PLAN 1
Presented with unresponsiveness, eye rolling and tongue movements, 10-15 second episode, of note  - c/w atorvastatin 80  - MOLST form documenting Do Not Hospitalize  - 24 hour EEG on 5/26-5/27 Normal  - hold off on MRI per pt daughter/HCP  - Pending discharge to [facility]  - s/p ECT on 6/1 and 6/2, 6/8  - At this time the patient is medically cleared for discharge

## 2022-06-09 NOTE — PROGRESS NOTE ADULT - PROBLEM SELECTOR PLAN 3
- CT head 5/18 and 5/25 with Severe chronic microvascular ischemic changes.  - At bedside patient is A&Ox2-3, awake and alert and follows commands, appears to be at baseline  - UA negative, CXR neg, doesn't appear different from baseline documented at [facility] inpt notes

## 2022-06-09 NOTE — PROGRESS NOTE ADULT - SUBJECTIVE AND OBJECTIVE BOX
Progress Note    DIXON WILBURN 72y (1950) Female [id number]  05-26-22 (14d)    Chief Complaint: unresponsiveness    Subjective:  No acute events overnight. Patient seen and examined at bedside.    Review of Systems:  REVIEW OF SYSTEMS:  CONSTITUTIONAL: No weakness, fevers or chills  EYES/ENT: No visual changes;  No vertigo or throat pain   NECK: No pain or stiffness  RESPIRATORY: No cough, wheezing, hemoptysis; No shortness of breath  CARDIOVASCULAR: No chest pain or palpitations  GASTROINTESTINAL: No abdominal or epigastric pain. No nausea, vomiting, or hematemesis; No diarrhea or constipation. No melena or hematochezia.  GENITOURINARY: No dysuria, frequency or hematuria  NEUROLOGICAL: No numbness or weakness  SKIN: No itching, rashes      PAST MEDICAL & SURGICAL HISTORY:  Major depression [F32.9]    Diabetes [E11.9]    Hypertension [I10]    Multiple sclerosis [G35]    Hypothyroidism [E03.9]    Gait difficulty [R26.9]  ~ wheelchair    Vascular dementia [F01.50]    Vitamin D deficiency [E55.9]    Type 2 diabetes mellitus [E11.9]    Suicidal ideation [R45.851]    History of COPD [Z87.09]  ~ not on home oxygen    No significant past surgical history [[id number]]    Patient unable to provide medical history [Z78.9]  ~ due to cognitive impairment      acetaminophen     Tablet .. 650 milliGRAM(s) Oral every 6 hours PRN  atorvastatin 80 milliGRAM(s) Oral at bedtime  cholecalciferol 2000 Unit(s) Oral daily  diphenhydrAMINE 25 milliGRAM(s) Oral every 4 hours PRN  enoxaparin Injectable 40 milliGRAM(s) SubCutaneous every 24 hours  FLUoxetine 40 milliGRAM(s) Oral daily  hydrochlorothiazide 25 milliGRAM(s) Oral daily  lisinopril 20 milliGRAM(s) Oral daily  methimazole 5 milliGRAM(s) Oral daily  polyethylene glycol 3350 17 Gram(s) Oral daily  QUEtiapine 12.5 milliGRAM(s) Oral at bedtime  rOPINIRole 2 milliGRAM(s) Oral at bedtime  senna 2 Tablet(s) Oral at bedtime    Objective:  T(C): 36.2 (06-09-22 @ 05:31), Max: 36.8 (06-08-22 @ 21:26)  HR: 66 (06-09-22 @ 05:31) (64 - 69)  BP: 130/63 (06-09-22 @ 05:31) (102/60 - 130/63)  RR: 18 (06-09-22 @ 05:31) (17 - 18)  SpO2: 98% (06-09-22 @ 05:31) (98% - 99%)    Physical exam:  GENERAL: NAD, well-developed  HEAD:  Atraumatic, Normocephalic  EYES: EOMI, PERRLA, conjunctiva and sclera clear  NECK: Supple, No JVD  CHEST/LUNG: Clear to auscultation bilaterally; No wheeze  HEART: Regular rate and rhythm; No murmurs, rubs, or gallops  ABDOMEN: Soft, Nontender, Nondistended; Bowel sounds present  EXTREMITIES:  2+ Peripheral Pulses, No clubbing, cyanosis, or edema  PSYCH: AAOx3  NEUROLOGY: non-focal  SKIN: No rashes or lesions        CAPILLARY BLOOD GLUCOSE                    RVP:          Tox:             WBC Trend:     Hb Trend:         New imaging in last 24 hours:  Consult notes reviewed:

## 2022-06-09 NOTE — BH CONSULTATION LIAISON PROGRESS NOTE - NSBHASSESSMENTFT_PSY_ALL_CORE
71 y/o Obese F PMHx Dementia (A+Ox1-2), MDD (hx of SI), T2DM, HTN, HLD, MS, COPD (not on home O2)  presented from [facility] for unresponsiveness. Psych consulted for ongoing follow-up. Pt had de paco period of unresponsiveness at [facility] that warrants medical/neuro evaluations. ECT was being considered but never actually administered. Differential includes medical etiology vs behavioral. Medically cleared after negative evaluations (head imaging, EEG).    Plan:   - Continue usual psychotropics: Prozac 20mg daily, Requip 2mg HS, Seroquel 12.5mg HS, and Ativan 0.5mg PO Q 6PRN anxiety  - Maintain enhanced supervision given impulsive behavior and inability to contract for safety; recommend 7S/Mindful Care Unit if possible  - ECT #5 scheduled for Friday 6/10; please make NPO overnight on night prior; please obtain COVID test within 72 hours from next ECT  - Will continue to follow  - Dispo: likely return to [facility] when medically cleared. Possible ECT continued either here or there.  Routine observation

## 2022-06-10 PROCEDURE — 99233 SBSQ HOSP IP/OBS HIGH 50: CPT

## 2022-06-10 PROCEDURE — 90870 ELECTROCONVULSIVE THERAPY: CPT

## 2022-06-10 PROCEDURE — 99232 SBSQ HOSP IP/OBS MODERATE 35: CPT | Mod: GC

## 2022-06-10 RX ORDER — LOPERAMIDE HCL 2 MG
2 TABLET ORAL DAILY
Refills: 0 | Status: DISCONTINUED | OUTPATIENT
Start: 2022-06-10 | End: 2022-06-18

## 2022-06-10 RX ADMIN — Medication 2000 UNIT(S): at 12:11

## 2022-06-10 RX ADMIN — QUETIAPINE FUMARATE 12.5 MILLIGRAM(S): 200 TABLET, FILM COATED ORAL at 21:40

## 2022-06-10 RX ADMIN — ENOXAPARIN SODIUM 40 MILLIGRAM(S): 100 INJECTION SUBCUTANEOUS at 06:28

## 2022-06-10 RX ADMIN — ROPINIROLE 2 MILLIGRAM(S): 8 TABLET, FILM COATED, EXTENDED RELEASE ORAL at 21:40

## 2022-06-10 RX ADMIN — ATORVASTATIN CALCIUM 80 MILLIGRAM(S): 80 TABLET, FILM COATED ORAL at 21:40

## 2022-06-10 RX ADMIN — Medication 40 MILLIGRAM(S): at 12:12

## 2022-06-10 RX ADMIN — Medication 2 MILLIGRAM(S): at 21:45

## 2022-06-10 NOTE — PROGRESS NOTE ADULT - ASSESSMENT
72 yF PMHx vascular Dementia (A+Ox1-2), MDD (hx of SI), HTN, HLD, MS on Plegridy, COPD (not on home O2)  presents from [facility] for unresponsiveness for 10-15 seconds, EEG negative and daughter reports these have happened multiple times without new findings on MRI. Pending tranfer back to [facility] when a bed is available.

## 2022-06-10 NOTE — PROGRESS NOTE ADULT - ATTENDING COMMENTS
Patient seen and examined at bedside. Medically stable, pending [facility] bed. DC planning time: 36 min in coordinating DC plan with patient and team.

## 2022-06-10 NOTE — PROGRESS NOTE ADULT - PROBLEM SELECTOR PLAN 2
- Severe episode of recurrent major depressive disorder, without psychotic features was the reason for her admission to [facility]  - Continue Prozac 40 mg daily, Quetiapine 12.5mg qhs

## 2022-06-10 NOTE — PROGRESS NOTE ADULT - SUBJECTIVE AND OBJECTIVE BOX
Madhav Chaves, PGY1  Pager [phone] [facility] or [phone] [facility]    Patient is a 72y old  Female who presents with a chief complaint of unresponsiveness (09 Jun 2022 07:51)      SUBJECTIVE/INTERVAL EVENTS: Patient seen and examined at bedside.  Patient was seen and examined at bedside this morning. Denies any nausea/vomiting/diarrhea, headache, shortness of breath, abdominal pain or chest pain/palpitations. Patient responding appropriately to questions and able to make needs known. Vital signs/imaging/telemetry events reviewed.      MEDICATIONS  (STANDING):  atorvastatin 80 milliGRAM(s) Oral at bedtime  cholecalciferol 2000 Unit(s) Oral daily  enoxaparin Injectable 40 milliGRAM(s) SubCutaneous every 24 hours  FLUoxetine 40 milliGRAM(s) Oral daily  hydrochlorothiazide 25 milliGRAM(s) Oral daily  lisinopril 20 milliGRAM(s) Oral daily  methimazole 5 milliGRAM(s) Oral daily  polyethylene glycol 3350 17 Gram(s) Oral daily  QUEtiapine 12.5 milliGRAM(s) Oral at bedtime  rOPINIRole 2 milliGRAM(s) Oral at bedtime  senna 2 Tablet(s) Oral at bedtime    MEDICATIONS  (PRN):  acetaminophen     Tablet .. 650 milliGRAM(s) Oral every 6 hours PRN Temp greater or equal to 38C (100.4F), Mild Pain (1 - 3)  diphenhydrAMINE 25 milliGRAM(s) Oral every 4 hours PRN Rash and/or Itching      VITAL SIGNS:  T(F): 97.6 (06-10-22 @ 06:10), Max: 98.2 (06-09-22 @ 21:40)  HR: 62 (06-10-22 @ 06:10) (62 - 73)  BP: 107/53 (06-10-22 @ 06:10) (106/64 - 129/66)  RR: 18 (06-10-22 @ 06:10) (17 - 18)  SpO2: 98% (06-10-22 @ 06:10) (98% - 98%)    I&O's Summary    Daily     Daily     PHYSICAL EXAM:  Gen: Alert, NAD  HEENT: NCAT, conjunctiva clear, sclera anicteric, no erythema or exudates in the oropharynx, mmm  Neck: Supple, no JVD  CV: RRR, S1S2, no m/r/g  Resp: CTAB, normal respiratory effort  Abd: Soft, nontender, nondistended, normal bowel sounds  Ext: no edema, no clubbing or cyanosis  Neuro: AOx3, CN2-12 grossly intact, SANTIAGO  SKIN: warm, perfused    LABS:    Hgb Trend:         Creatinine Trend: 0.67<--, 0.94<--, 0.89<--, 0.77<--, 0.71<--, 0.74<--              CAPILLARY BLOOD GLUCOSE          RADIOLOGY & ADDITIONAL TESTS: Reviewed    Imaging Personally Reviewed:    Consultant(s) Notes Reviewed:      Care Discussed with Consultants/Other Providers:   Madhav Chaves, PGY1  Pager [phone] [facility] or [phone] [facility]    Patient is a 72y old  Female who presents with a chief complaint of unresponsiveness (09 Jun 2022 07:51)      SUBJECTIVE/INTERVAL EVENTS: Patient seen and examined at bedside.  ECT    MEDICATIONS  (STANDING):  atorvastatin 80 milliGRAM(s) Oral at bedtime  cholecalciferol 2000 Unit(s) Oral daily  enoxaparin Injectable 40 milliGRAM(s) SubCutaneous every 24 hours  FLUoxetine 40 milliGRAM(s) Oral daily  hydrochlorothiazide 25 milliGRAM(s) Oral daily  lisinopril 20 milliGRAM(s) Oral daily  methimazole 5 milliGRAM(s) Oral daily  polyethylene glycol 3350 17 Gram(s) Oral daily  QUEtiapine 12.5 milliGRAM(s) Oral at bedtime  rOPINIRole 2 milliGRAM(s) Oral at bedtime  senna 2 Tablet(s) Oral at bedtime    MEDICATIONS  (PRN):  acetaminophen     Tablet .. 650 milliGRAM(s) Oral every 6 hours PRN Temp greater or equal to 38C (100.4F), Mild Pain (1 - 3)  diphenhydrAMINE 25 milliGRAM(s) Oral every 4 hours PRN Rash and/or Itching      VITAL SIGNS:  T(F): 97.6 (06-10-22 @ 06:10), Max: 98.2 (06-09-22 @ 21:40)  HR: 62 (06-10-22 @ 06:10) (62 - 73)  BP: 107/53 (06-10-22 @ 06:10) (106/64 - 129/66)  RR: 18 (06-10-22 @ 06:10) (17 - 18)  SpO2: 98% (06-10-22 @ 06:10) (98% - 98%)    I&O's Summary    Daily     Daily     PHYSICAL EXAM:  ECT    LABS:    Hgb Trend:         Creatinine Trend: 0.67<--, 0.94<--, 0.89<--, 0.77<--, 0.71<--, 0.74<--              CAPILLARY BLOOD GLUCOSE          RADIOLOGY & ADDITIONAL TESTS: Reviewed    Imaging Personally Reviewed:    Consultant(s) Notes Reviewed:      Care Discussed with Consultants/Other Providers:   Madhav Chaves, PGY1  Pager [phone] [facility] or [phone] [facility]    Patient is a 72y old  Female who presents with a chief complaint of unresponsiveness (09 Jun 2022 07:51)      SUBJECTIVE/INTERVAL EVENTS: Patient seen and examined at bedside.  Patient was seen and examined at bedside this morning. Denies any nausea/vomiting/diarrhea, headache, shortness of breath, abdominal pain or chest pain/palpitations. Patient responding appropriately to questions and able to make needs known. Vital signs/imaging/telemetry events reviewed.      MEDICATIONS  (STANDING):  atorvastatin 80 milliGRAM(s) Oral at bedtime  cholecalciferol 2000 Unit(s) Oral daily  enoxaparin Injectable 40 milliGRAM(s) SubCutaneous every 24 hours  FLUoxetine 40 milliGRAM(s) Oral daily  hydrochlorothiazide 25 milliGRAM(s) Oral daily  lisinopril 20 milliGRAM(s) Oral daily  methimazole 5 milliGRAM(s) Oral daily  polyethylene glycol 3350 17 Gram(s) Oral daily  QUEtiapine 12.5 milliGRAM(s) Oral at bedtime  rOPINIRole 2 milliGRAM(s) Oral at bedtime  senna 2 Tablet(s) Oral at bedtime    MEDICATIONS  (PRN):  acetaminophen     Tablet .. 650 milliGRAM(s) Oral every 6 hours PRN Temp greater or equal to 38C (100.4F), Mild Pain (1 - 3)  diphenhydrAMINE 25 milliGRAM(s) Oral every 4 hours PRN Rash and/or Itching      VITAL SIGNS:  T(F): 97.6 (06-10-22 @ 06:10), Max: 98.2 (06-09-22 @ 21:40)  HR: 62 (06-10-22 @ 06:10) (62 - 73)  BP: 107/53 (06-10-22 @ 06:10) (106/64 - 129/66)  RR: 18 (06-10-22 @ 06:10) (17 - 18)  SpO2: 98% (06-10-22 @ 06:10) (98% - 98%)    I&O's Summary    Daily     Daily     PHYSICAL EXAM:  GENERAL: NAD, well-developed  HEENT:  Atraumatic, Normocephalic, EOMI, PERRLA, conjunctiva and sclera clear, oral mucosa moist, clear w/o any exudate   NECK: Supple, No JVD  CHEST/LUNG: Clear to auscultation bilaterally; No wheeze  HEART: RRR; No murmurs, rubs, or gallops  ABDOMEN: Soft, Nontender, Nondistended; Bowel sounds present  EXTREMITIES:  2+ Peripheral Pulses, No clubbing, cyanosis, or edema  PSYCH: AAOx3, normal affect   NEUROLOGY: non-focal, moving all extremities  SKIN: No rashes or lesions    LABS:    Hgb Trend:         Creatinine Trend: 0.67<--, 0.94<--, 0.89<--, 0.77<--, 0.71<--, 0.74<--              CAPILLARY BLOOD GLUCOSE          RADIOLOGY & ADDITIONAL TESTS: Reviewed    Imaging Personally Reviewed:    Consultant(s) Notes Reviewed:      Care Discussed with Consultants/Other Providers:

## 2022-06-10 NOTE — PROGRESS NOTE ADULT - PROBLEM SELECTOR PLAN 3
- CT head 5/18 and 5/25 with Severe chronic microvascular ischemic changes.  - At bedside patient is A&Ox2-3, awake and alert and follows commands, appears to be at baseline  - UA negative, CXR neg, doesn't appear different from baseline documented at [facility] inpt notes

## 2022-06-10 NOTE — BH CONSULTATION LIAISON PROGRESS NOTE - NSBHASSESSMENTFT_PSY_ALL_CORE
73 y/o Obese F PMHx Dementia (A+Ox1-2), MDD (hx of SI), T2DM, HTN, HLD, MS, COPD (not on home O2)  presented from [facility] for unresponsiveness. Psych consulted for ongoing follow-up. Pt had de paco period of unresponsiveness at [facility] that warrants medical/neuro evaluations. ECT was being considered but never actually administered. Differential includes medical etiology vs behavioral. Medically cleared after negative evaluations (head imaging, EEG).    Plan:   - Continue usual psychotropics: Prozac 20mg daily, Requip 2mg HS, Seroquel 12.5mg HS, and Ativan 0.5mg PO Q 6PRN anxiety  - Maintain enhanced supervision given impulsive behavior and inability to contract for safety; recommend 7S/Mindful Care Unit if possible  - ECT #6 scheduled for Tuesday 6/14; please make NPO overnight on night prior; please obtain COVID test within 72 hours from next ECT  - Will continue to follow  - Dispo: likely return to [facility] when medically cleared. Possible ECT continued either here or there.

## 2022-06-10 NOTE — PROGRESS NOTE ADULT - PROBLEM SELECTOR PLAN 1
Presented with unresponsiveness, eye rolling and tongue movements, 10-15 second episode, of note  - c/w atorvastatin 80  - MOLST form documenting Do Not Hospitalize  - 24 hour EEG on 5/26-5/27 Normal  - hold off on MRI per pt daughter/HCP  - Pending discharge to [facility]  - s/p ECT on 6/1 and 6/2, 6/8, 6/10  - At this time the patient is medically cleared for discharge

## 2022-06-11 PROCEDURE — 99232 SBSQ HOSP IP/OBS MODERATE 35: CPT

## 2022-06-11 RX ADMIN — SENNA PLUS 2 TABLET(S): 8.6 TABLET ORAL at 21:31

## 2022-06-11 RX ADMIN — ENOXAPARIN SODIUM 40 MILLIGRAM(S): 100 INJECTION SUBCUTANEOUS at 05:44

## 2022-06-11 RX ADMIN — Medication 2000 UNIT(S): at 11:28

## 2022-06-11 RX ADMIN — LISINOPRIL 20 MILLIGRAM(S): 2.5 TABLET ORAL at 05:27

## 2022-06-11 RX ADMIN — ATORVASTATIN CALCIUM 80 MILLIGRAM(S): 80 TABLET, FILM COATED ORAL at 21:32

## 2022-06-11 RX ADMIN — Medication 25 MILLIGRAM(S): at 05:27

## 2022-06-11 RX ADMIN — Medication 2 MILLIGRAM(S): at 20:14

## 2022-06-11 RX ADMIN — QUETIAPINE FUMARATE 12.5 MILLIGRAM(S): 200 TABLET, FILM COATED ORAL at 21:32

## 2022-06-11 RX ADMIN — Medication 40 MILLIGRAM(S): at 11:28

## 2022-06-11 RX ADMIN — ROPINIROLE 2 MILLIGRAM(S): 8 TABLET, FILM COATED, EXTENDED RELEASE ORAL at 21:31

## 2022-06-11 NOTE — PROGRESS NOTE ADULT - PROBLEM SELECTOR PLAN 2
- Severe episode of recurrent major depressive disorder, without psychotic features was the reason for her admission to [facility]  - Continue Prozac 40 mg daily, Quetiapine 12.5mg qhs

## 2022-06-11 NOTE — PROGRESS NOTE ADULT - SUBJECTIVE AND OBJECTIVE BOX
Madhav Chaves, PGY1  Pager [phone] [facility] or [phone] [facility]    Patient is a 72y old  Female who presents with a chief complaint of unresponsiveness (10 Eber 2022 07:27)      SUBJECTIVE/INTERVAL EVENTS: Patient seen and examined at bedside.  Patient was seen and examined at bedside this morning. Denies any nausea/vomiting/diarrhea, headache, shortness of breath, abdominal pain or chest pain/palpitations. Patient responding appropriately to questions and able to make needs known. Vital signs/imaging/telemetry events reviewed.      MEDICATIONS  (STANDING):  atorvastatin 80 milliGRAM(s) Oral at bedtime  cholecalciferol 2000 Unit(s) Oral daily  enoxaparin Injectable 40 milliGRAM(s) SubCutaneous every 24 hours  FLUoxetine 40 milliGRAM(s) Oral daily  hydrochlorothiazide 25 milliGRAM(s) Oral daily  lisinopril 20 milliGRAM(s) Oral daily  methimazole 5 milliGRAM(s) Oral daily  polyethylene glycol 3350 17 Gram(s) Oral daily  QUEtiapine 12.5 milliGRAM(s) Oral at bedtime  rOPINIRole 2 milliGRAM(s) Oral at bedtime  senna 2 Tablet(s) Oral at bedtime    MEDICATIONS  (PRN):  acetaminophen     Tablet .. 650 milliGRAM(s) Oral every 6 hours PRN Temp greater or equal to 38C (100.4F), Mild Pain (1 - 3)  diphenhydrAMINE 25 milliGRAM(s) Oral every 4 hours PRN Rash and/or Itching  loperamide 2 milliGRAM(s) Oral daily PRN Diarrhea      VITAL SIGNS:  T(F): 97.4 (06-11-22 @ 05:25), Max: 98 (06-10-22 @ 21:00)  HR: 64 (06-11-22 @ 05:25) (63 - 73)  BP: 146/60 (06-11-22 @ 05:25) (111/64 - 146/60)  RR: 16 (06-11-22 @ 05:25) (16 - 18)  SpO2: 97% (06-11-22 @ 05:25) (97% - 98%)    I&O's Summary    Daily     Daily     PHYSICAL EXAM:  Gen: Alert, NAD  HEENT: NCAT, conjunctiva clear, sclera anicteric, no erythema or exudates in the oropharynx, mmm  Neck: Supple, no JVD  CV: RRR, S1S2, no m/r/g  Resp: CTAB, normal respiratory effort  Abd: Soft, nontender, nondistended, normal bowel sounds  Ext: no edema, no clubbing or cyanosis  Neuro: AOx3, CN2-12 grossly intact, SANTIAGO  SKIN: warm, perfused    LABS:    Hgb Trend:         Creatinine Trend: 0.67<--, 0.94<--, 0.89<--, 0.77<--, 0.71<--, 0.74<--              CAPILLARY BLOOD GLUCOSE          RADIOLOGY & ADDITIONAL TESTS: Reviewed    Imaging Personally Reviewed:    Consultant(s) Notes Reviewed:      Care Discussed with Consultants/Other Providers:

## 2022-06-11 NOTE — PROGRESS NOTE ADULT - ATTENDING COMMENTS
72W with PMH MDD with hx of SI and prior attempts, ?dementia, T2DM, HTN, HLD, MS, COPD who is presenting from [facility] with unresponsive episode PRIOR to her first session of ECT. Per daughter she had been worked up multiple times for identical episodes without being able to identify organic cause. Medically stable    Pending bed at [facility]

## 2022-06-11 NOTE — PROGRESS NOTE ADULT - PROBLEM SELECTOR PLAN 1
Presented with unresponsiveness, eye rolling and tongue movements, 10-15 second episode, of note  - c/w atorvastatin 80  - MOLST form documenting Do Not Hospitalize  - 24 hour EEG on 5/26-5/27 Normal  - hold off on MRI per pt daughter/HCP  - Pending discharge to [facility]  - s/p ECT on 6/1 and 6/2, 6/8, 6/10  - At this time the patient is medically cleared for discharge

## 2022-06-11 NOTE — PROGRESS NOTE ADULT - PROBLEM SELECTOR PLAN 3
- CT head 5/18 and 5/25 with Severe chronic microvascular ischemic changes.  - At bedside patient is A&Ox2-3, awake and alert and follows commands, appears to be at baseline  - UA negative, CXR neg, doesn't appear different from baseline documented at [facility] inpt notes

## 2022-06-11 NOTE — PROVIDER CONTACT NOTE (OTHER) - ASSESSMENT
Patient is asymptomatic
Patients blood pressure 107/53 heart rate 62. Patient asymptomatic
Patient is complaining of itchiness of the back. No acute distress noted.
Patient /49, RR18, HR:73, Temp:99, SP02: 98, Patient comfortable and asymptomatic.

## 2022-06-11 NOTE — PROVIDER CONTACT NOTE (OTHER) - ACTION/TREATMENT ORDERED:
Benadryl ordered. MD aware and notified. Will continue to monitor patient.
Ofe Curran no new interventions.
MD Navarro made aware. Ok to hold the blood pressure medicine. Nursing care to continue
MD made aware. No new interventions at this time.

## 2022-06-11 NOTE — PROVIDER CONTACT NOTE (OTHER) - SITUATION
Patient is complaining of itchiness of the back
Patient /49, RR18, HR:73, Temp:99, SP02: 98
HR 59
low diastolic

## 2022-06-11 NOTE — PROVIDER CONTACT NOTE (OTHER) - RECOMMENDATIONS
Notify MD.
Notify MD Ofe Curran
MD to be notified, holding due hydrochlorothiazide and lisinopril due to parameters
Benadryl ordered. Continue to monitor patient.

## 2022-06-11 NOTE — PROGRESS NOTE ADULT - ASSESSMENT
72 yF PMHx vascular Dementia (A+Ox1-2), MDD (hx of SI), HTN, HLD, MS on Plegridy, COPD (not on home O2)  presents from [facility] for unresponsiveness for 10-15 seconds, EEG negative and daughter reports these have happened multiple times without new findings on MRI. Pending tranfer back to [facility] when a bed is available.

## 2022-06-11 NOTE — PROVIDER CONTACT NOTE (OTHER) - BACKGROUND
Patient admitted for Altered mental status, PMH of HTN, Diabetes, Hypothyroidism, Major Depression, Multiple sclerosis.
Patient admitted with altered mental status
Patient diagnosed with altered mental status
Patient admitted for altered mental status

## 2022-06-12 PROCEDURE — 99233 SBSQ HOSP IP/OBS HIGH 50: CPT | Mod: GC

## 2022-06-12 RX ADMIN — Medication 2000 UNIT(S): at 11:12

## 2022-06-12 RX ADMIN — ROPINIROLE 2 MILLIGRAM(S): 8 TABLET, FILM COATED, EXTENDED RELEASE ORAL at 22:01

## 2022-06-12 RX ADMIN — ENOXAPARIN SODIUM 40 MILLIGRAM(S): 100 INJECTION SUBCUTANEOUS at 05:44

## 2022-06-12 RX ADMIN — ATORVASTATIN CALCIUM 80 MILLIGRAM(S): 80 TABLET, FILM COATED ORAL at 22:01

## 2022-06-12 RX ADMIN — SENNA PLUS 2 TABLET(S): 8.6 TABLET ORAL at 21:59

## 2022-06-12 RX ADMIN — Medication 40 MILLIGRAM(S): at 11:12

## 2022-06-12 RX ADMIN — QUETIAPINE FUMARATE 12.5 MILLIGRAM(S): 200 TABLET, FILM COATED ORAL at 22:00

## 2022-06-12 NOTE — PROGRESS NOTE ADULT - SUBJECTIVE AND OBJECTIVE BOX
Madhav Chaves, PGY1  Pager [phone] [facility] or [phone] [facility]    Patient is a 72y old  Female who presents with a chief complaint of unresponsiveness (11 Jun 2022 07:00)      SUBJECTIVE/INTERVAL EVENTS: Patient seen and examined at bedside.  Patient was seen and examined at bedside this morning. Denies any nausea/vomiting/diarrhea, headache, shortness of breath, abdominal pain or chest pain/palpitations. Patient responding appropriately to questions and able to make needs known. Vital signs/imaging/telemetry events reviewed.      MEDICATIONS  (STANDING):  atorvastatin 80 milliGRAM(s) Oral at bedtime  cholecalciferol 2000 Unit(s) Oral daily  enoxaparin Injectable 40 milliGRAM(s) SubCutaneous every 24 hours  FLUoxetine 40 milliGRAM(s) Oral daily  hydrochlorothiazide 25 milliGRAM(s) Oral daily  lisinopril 20 milliGRAM(s) Oral daily  methimazole 5 milliGRAM(s) Oral daily  polyethylene glycol 3350 17 Gram(s) Oral daily  QUEtiapine 12.5 milliGRAM(s) Oral at bedtime  rOPINIRole 2 milliGRAM(s) Oral at bedtime  senna 2 Tablet(s) Oral at bedtime    MEDICATIONS  (PRN):  acetaminophen     Tablet .. 650 milliGRAM(s) Oral every 6 hours PRN Temp greater or equal to 38C (100.4F), Mild Pain (1 - 3)  diphenhydrAMINE 25 milliGRAM(s) Oral every 4 hours PRN Rash and/or Itching  loperamide 2 milliGRAM(s) Oral daily PRN Diarrhea      VITAL SIGNS:  T(F): 98.2 (06-12-22 @ 05:26), Max: 98.2 (06-12-22 @ 05:26)  HR: 63 (06-12-22 @ 05:26) (59 - 65)  BP: 115/65 (06-12-22 @ 05:26) (115/65 - 127/60)  RR: 18 (06-12-22 @ 05:26) (17 - 18)  SpO2: 97% (06-12-22 @ 05:26) (97% - 97%)    I&O's Summary    Daily     Daily     PHYSICAL EXAM:  Gen: Alert, NAD  HEENT: NCAT, conjunctiva clear, sclera anicteric, no erythema or exudates in the oropharynx, mmm  Neck: Supple, no JVD  CV: RRR, S1S2, no m/r/g  Resp: CTAB, normal respiratory effort  Abd: Soft, nontender, nondistended, normal bowel sounds  Ext: no edema, no clubbing or cyanosis  Neuro: AOx3, CN2-12 grossly intact, SANTIAGO  SKIN: warm, perfused    LABS:    Hgb Trend:         Creatinine Trend: 0.67<--, 0.94<--, 0.89<--, 0.77<--, 0.71<--, 0.74<--              CAPILLARY BLOOD GLUCOSE          RADIOLOGY & ADDITIONAL TESTS: Reviewed    Imaging Personally Reviewed:    Consultant(s) Notes Reviewed:      Care Discussed with Consultants/Other Providers:

## 2022-06-12 NOTE — PROGRESS NOTE ADULT - ASSESSMENT
72 yF PMHx vascular Dementia (A+Ox1-2), MDD (hx of SI), HTN, HLD, MS on Plegridy, COPD (not on home O2)  presents from [facility] for unresponsiveness for 10-15 seconds, EEG negative and daughter reports these have happened multiple times without new findings on MRI. Pending tranfer back to [facility] when a bed is available.

## 2022-06-12 NOTE — PROGRESS NOTE ADULT - PROBLEM SELECTOR PLAN 1
Presented with unresponsiveness, eye rolling and tongue movements, 10-15 second episode, of note  - c/w atorvastatin 80  - MOLST form documenting Do Not Hospitalize  - 24 hour EEG on 5/26-5/27 Normal  - hold off on MRI per pt daughter/HCP  - Pending discharge to [facility]  - s/p ECT on 6/1 and 6/2, 6/8, 6/10  - At this time the patient is medically cleared for discharge  - will plan for ECT on 6/14 needs COVID swab before and NPO after midnight

## 2022-06-12 NOTE — ED BEHAVIORAL HEALTH NOTE - BEHAVIORAL HEALTH NOTE
Pt is clear for d/c to [facility] (prior admit 5/19 and medically d/c to [facility] 9N.) Per ADN, no beds available on this day for medical pts.

## 2022-06-12 NOTE — PROGRESS NOTE ADULT - PROBLEM SELECTOR PLAN 3
- CT head 5/18 and 5/25 with Severe chronic microvascular ischemic changes.  - At bedside patient is A&Ox2-3, awake and alert and follows commands, appears to be at baseline  - UA negative, CXR neg, doesn't appear different from baseline documented at [facility] inpt notes

## 2022-06-12 NOTE — PROGRESS NOTE ADULT - ATTENDING COMMENTS
72W with PMH MDD with hx of SI and prior attempts, ?dementia, T2DM, HTN, HLD, MS, COPD who is presenting from [facility] with unresponsive episode PRIOR to her first session of ECT. Per daughter she had been worked up multiple times for identical episodes without being able to identify organic cause. Medically stable, pending bed at [facility].

## 2022-06-12 NOTE — PROGRESS NOTE ADULT - PROBLEM SELECTOR PLAN 2
- Severe episode of recurrent major depressive disorder, without psychotic features was the reason for her admission to [facility]  - Continue Prozac 40 mg daily, Quetiapine 12.5mg qhs

## 2022-06-13 LAB — SARS-COV-2 RNA SPEC QL NAA+PROBE: SIGNIFICANT CHANGE UP

## 2022-06-13 PROCEDURE — 99233 SBSQ HOSP IP/OBS HIGH 50: CPT | Mod: GC

## 2022-06-13 PROCEDURE — 99233 SBSQ HOSP IP/OBS HIGH 50: CPT

## 2022-06-13 RX ORDER — SODIUM CHLORIDE 9 MG/ML
1000 INJECTION, SOLUTION INTRAVENOUS
Refills: 0 | Status: DISCONTINUED | OUTPATIENT
Start: 2022-06-13 | End: 2022-06-18

## 2022-06-13 RX ADMIN — ATORVASTATIN CALCIUM 80 MILLIGRAM(S): 80 TABLET, FILM COATED ORAL at 21:30

## 2022-06-13 RX ADMIN — ROPINIROLE 2 MILLIGRAM(S): 8 TABLET, FILM COATED, EXTENDED RELEASE ORAL at 21:30

## 2022-06-13 RX ADMIN — ENOXAPARIN SODIUM 40 MILLIGRAM(S): 100 INJECTION SUBCUTANEOUS at 05:28

## 2022-06-13 RX ADMIN — QUETIAPINE FUMARATE 12.5 MILLIGRAM(S): 200 TABLET, FILM COATED ORAL at 21:30

## 2022-06-13 RX ADMIN — Medication 25 MILLIGRAM(S): at 05:25

## 2022-06-13 RX ADMIN — LISINOPRIL 20 MILLIGRAM(S): 2.5 TABLET ORAL at 05:26

## 2022-06-13 RX ADMIN — Medication 40 MILLIGRAM(S): at 12:13

## 2022-06-13 RX ADMIN — Medication 2000 UNIT(S): at 12:14

## 2022-06-13 NOTE — PROGRESS NOTE ADULT - SUBJECTIVE AND OBJECTIVE BOX
PROGRESS NOTE:     Patient is a 72y old  Female who presents with a chief complaint of unresponsiveness (12 Jun 2022 07:15)      SUBJECTIVE / OVERNIGHT EVENTS:    ADDITIONAL REVIEW OF SYSTEMS: 10 point ROS negative except per HPI    MEDICATIONS  (STANDING):  atorvastatin 80 milliGRAM(s) Oral at bedtime  cholecalciferol 2000 Unit(s) Oral daily  enoxaparin Injectable 40 milliGRAM(s) SubCutaneous every 24 hours  FLUoxetine 40 milliGRAM(s) Oral daily  hydrochlorothiazide 25 milliGRAM(s) Oral daily  lisinopril 20 milliGRAM(s) Oral daily  methimazole 5 milliGRAM(s) Oral daily  polyethylene glycol 3350 17 Gram(s) Oral daily  QUEtiapine 12.5 milliGRAM(s) Oral at bedtime  rOPINIRole 2 milliGRAM(s) Oral at bedtime  senna 2 Tablet(s) Oral at bedtime    MEDICATIONS  (PRN):  acetaminophen     Tablet .. 650 milliGRAM(s) Oral every 6 hours PRN Temp greater or equal to 38C (100.4F), Mild Pain (1 - 3)  diphenhydrAMINE 25 milliGRAM(s) Oral every 4 hours PRN Rash and/or Itching  loperamide 2 milliGRAM(s) Oral daily PRN Diarrhea      CAPILLARY BLOOD GLUCOSE        I&O's Summary      PHYSICAL EXAM:  Vital Signs Last 24 Hrs  T(C): 36.5 (13 Jun 2022 05:15), Max: 37.1 (12 Jun 2022 21:28)  T(F): 97.7 (13 Jun 2022 05:15), Max: 98.8 (12 Jun 2022 21:28)  HR: 64 (13 Jun 2022 05:15) (64 - 73)  BP: 129/67 (13 Jun 2022 05:15) (107/53 - 129/67)  BP(mean): --  RR: 17 (13 Jun 2022 05:15) (17 - 17)  SpO2: 97% (13 Jun 2022 05:15) (97% - 99%)    CONSTITUTIONAL: NAD, well-developed, A&Ox3 to person, place, time.  RESPIRATORY: Normal respiratory effort; lungs are clear to auscultation bilaterally  CARDIOVASCULAR: Regular rate and rhythm, normal S1 and S2, no murmur/rub/gallop; No lower extremity edema; Peripheral pulses are 2+ bilaterally  ABDOMEN: Nontender to palpation, no rebound/guarding; No hepatosplenomegaly  MUSCLOSKELETAL: no clubbing or cyanosis of digits; no joint swelling or tenderness to palpation  NEURO: CN 2-12 grossly intact, moves all limbs spontaneously      LABS:      -----    MICRO      -----    TRENDS    Creatinine Trend: 0.67<--, 0.94<--, 0.89<--, 0.77<--, 0.71<--, 0.74<--  ----  RADIOLOGY & ADDITIONAL TESTS:  Results Reviewed:   Imaging Personally Reviewed:  Electrocardiogram Personally Reviewed: PROGRESS NOTE:     Patient is a 72y old  Female who presents with a chief complaint of unresponsiveness (12 Jun 2022 07:15)      SUBJECTIVE / OVERNIGHT EVENTS: No acute events overnight. Denies fever, chills, chest pain, nausea vomiting abdominal pain. Reports urinary incontinence unchanged from baseline.     ADDITIONAL REVIEW OF SYSTEMS: 10 point ROS negative except per HPI    MEDICATIONS  (STANDING):  atorvastatin 80 milliGRAM(s) Oral at bedtime  cholecalciferol 2000 Unit(s) Oral daily  enoxaparin Injectable 40 milliGRAM(s) SubCutaneous every 24 hours  FLUoxetine 40 milliGRAM(s) Oral daily  hydrochlorothiazide 25 milliGRAM(s) Oral daily  lisinopril 20 milliGRAM(s) Oral daily  methimazole 5 milliGRAM(s) Oral daily  polyethylene glycol 3350 17 Gram(s) Oral daily  QUEtiapine 12.5 milliGRAM(s) Oral at bedtime  rOPINIRole 2 milliGRAM(s) Oral at bedtime  senna 2 Tablet(s) Oral at bedtime    MEDICATIONS  (PRN):  acetaminophen     Tablet .. 650 milliGRAM(s) Oral every 6 hours PRN Temp greater or equal to 38C (100.4F), Mild Pain (1 - 3)  diphenhydrAMINE 25 milliGRAM(s) Oral every 4 hours PRN Rash and/or Itching  loperamide 2 milliGRAM(s) Oral daily PRN Diarrhea      CAPILLARY BLOOD GLUCOSE        I&O's Summary      PHYSICAL EXAM:  Vital Signs Last 24 Hrs  T(C): 36.5 (13 Jun 2022 05:15), Max: 37.1 (12 Jun 2022 21:28)  T(F): 97.7 (13 Jun 2022 05:15), Max: 98.8 (12 Jun 2022 21:28)  HR: 64 (13 Jun 2022 05:15) (64 - 73)  BP: 129/67 (13 Jun 2022 05:15) (107/53 - 129/67)  RR: 17 (13 Jun 2022 05:15) (17 - 17)  SpO2: 97% (13 Jun 2022 05:15) (97% - 99%)    CONSTITUTIONAL: NAD, well-developed, A&Ox3 to person, place, time.  HEENT: NCAT, sclera anicteric, moist mucous membranes  RESPIRATORY: Normal respiratory effort; lungs are clear to auscultation bilaterally  CARDIOVASCULAR: Regular rate and rhythm, holosystolic murmur at the left sternal border, normal S1 and S2, no murmur/rub/gallop   ABDOMEN: Nontender to palpation, no rebound/guarding   MUSCLOSKELETAL: no clubbing or cyanosis of digits; no edema  NEURO: CN 2-12 grossly intact, moves all limbs spontaneously      LABS:      -----    MICRO      -----    TRENDS    Creatinine Trend: 0.67<--, 0.94<--, 0.89<--, 0.77<--, 0.71<--, 0.74<--  ----  RADIOLOGY & ADDITIONAL TESTS:  Results Reviewed:   Imaging Personally Reviewed:  Electrocardiogram Personally Reviewed:

## 2022-06-13 NOTE — PROGRESS NOTE ADULT - ASSESSMENT
72 yF PMHx vascular Dementia (A+Ox1-2), MDD (hx of SI), HTN, HLD, MS on Plegridy, COPD (not on home O2)  presents from [facility] for unresponsiveness for 10-15 seconds, EEG negative and daughter reports these have happened multiple times without new findings on MRI. Pending tranfer back to [facility] when a bed is available.

## 2022-06-13 NOTE — PROGRESS NOTE ADULT - PROBLEM SELECTOR PLAN 1
Presented with unresponsiveness, eye rolling and tongue movements, 10-15 second episode, of note  - c/w atorvastatin 80  - MOLST form documenting Do Not Hospitalize  - 24 hour EEG on 5/26-5/27 Normal  - hold off on MRI per pt daughter/HCP  - Pending discharge to [facility]  - s/p ECT on 6/1 and 6/2, 6/8, 6/10  - At this time the patient is medically cleared for discharge  - will plan for ECT on 6/14 needs COVID swab before and NPO after midnight Presented with unresponsiveness, eye rolling and tongue movements, 10-15 second episode  - c/w atorvastatin 80  - MOLST form documenting Do Not Hospitalize  - 24 hour EEG on 5/26-5/27 Normal  - hold off on MRI per pt daughter/HCP  - TTE to r/o aortic stenosis given systolic murmur   - At this time the patient is medically cleared for discharge. Pending discharge to [facility] awaiting bed availability

## 2022-06-13 NOTE — PROGRESS NOTE ADULT - PROBLEM SELECTOR PLAN 6
well controlled  - Continue home regimen HCTZ 25mg QD and lisinopril 20mg QD Well controlled. Continue home regimen HCTZ 25mg QD and lisinopril 20mg QD

## 2022-06-13 NOTE — BH CONSULTATION LIAISON PROGRESS NOTE - NSBHASSESSMENTFT_PSY_ALL_CORE
73 y/o Obese F PMHx Dementia (A+Ox1-2), MDD (hx of SI), T2DM, HTN, HLD, MS, COPD (not on home O2)  presented from [facility] for unresponsiveness. Psych consulted for ongoing follow-up. Pt had de paco period of unresponsiveness at [facility] that warrants medical/neuro evaluations. ECT was being considered but never actually administered. Differential includes medical etiology vs behavioral. Medically cleared after negative evaluations (head imaging, EEG).    Plan:   - Continue usual psychotropics: Prozac 20mg daily, Requip 2mg HS, Seroquel 12.5mg HS, and Ativan 0.5mg PO Q 6PRN anxiety  - Maintain enhanced supervision given impulsive behavior and inability to contract for safety; recommend 7S/Mindful Care Unit if possible  - ECT #6 scheduled for Tuesday 6/14; please make NPO overnight on night prior; please obtain COVID test within 72 hours from next ECT  - Will continue to follow  - Dispo: likely return to [facility] when medically cleared. Possible ECT continued either here or there.

## 2022-06-13 NOTE — PROGRESS NOTE ADULT - PROBLEM SELECTOR PLAN 3
- CT head 5/18 and 5/25 with Severe chronic microvascular ischemic changes.  - At bedside patient is A&Ox2-3, awake and alert and follows commands, appears to be at baseline  - UA negative, CXR neg, doesn't appear different from baseline documented at [facility] inpt notes - CT head 5/18 and 5/25 with Severe chronic microvascular ischemic changes.  - At bedside patient is A&Ox2-3, awake and alert and follows commands

## 2022-06-13 NOTE — PROGRESS NOTE ADULT - ATTENDING COMMENTS
The patient reports improvement in her depression since she was first admitted to [facility].  She reports concern for the episodes of unresponsiveness, most notably for the times when she has lost control of her bowels/bladder during those episodes.  She is awaiting bed placement at [facility].  Psychiatry is following and recommends to continue her current medication regimen and the ECT treatment tomorrow morning for which she needs to have no food/drink after midnight due to anesthesia.

## 2022-06-13 NOTE — PROGRESS NOTE ADULT - PROBLEM SELECTOR PLAN 5
- hx of MS  - wheelchair bound but can transfer and walk a few steps with walker, incontinent   - pt receives Plegridy q 2 weeks - 6/13  - f/u neuro outpatient for same  - s/p interferon on 5/30 - wheelchair bound but can transfer and walk a few steps with walker, incontinent   - pt receives Plegridy q 2 weeks - 6/13  - f/u neuro outpatient  - s/p interferon on 5/30

## 2022-06-13 NOTE — PROGRESS NOTE ADULT - PROBLEM SELECTOR PLAN 4
- continue methimazole 5mg qd  - FT3 = 3.03 on 05/19/2022 (within acceptable range) - continue methimazole 5mg qd  - FT3  3.03 on 05/19/2022 (within acceptable range)

## 2022-06-13 NOTE — PROGRESS NOTE ADULT - PROBLEM SELECTOR PLAN 2
- Severe episode of recurrent major depressive disorder, without psychotic features was the reason for her admission to [facility]  - Continue Prozac 40 mg daily, Quetiapine 12.5mg qhs - Severe episode of recurrent major depressive disorder, without psychotic features was the reason for her admission to [facility]  - Psych recs appreciated   - Continue Prozac 40 mg daily, Quetiapine 12.5mg qhs  - Planned for ECT #6 on 6/14 while awaiting bed at [facility]

## 2022-06-14 PROCEDURE — 99233 SBSQ HOSP IP/OBS HIGH 50: CPT

## 2022-06-14 PROCEDURE — 99232 SBSQ HOSP IP/OBS MODERATE 35: CPT | Mod: GC

## 2022-06-14 PROCEDURE — 90870 ELECTROCONVULSIVE THERAPY: CPT

## 2022-06-14 PROCEDURE — 93306 TTE W/DOPPLER COMPLETE: CPT | Mod: 26

## 2022-06-14 RX ORDER — CALAMINE AND ZINC OXIDE AND PHENOL 160; 10 MG/ML; MG/ML
1 LOTION TOPICAL
Refills: 0 | Status: DISCONTINUED | OUTPATIENT
Start: 2022-06-14 | End: 2022-06-18

## 2022-06-14 RX ADMIN — ATORVASTATIN CALCIUM 80 MILLIGRAM(S): 80 TABLET, FILM COATED ORAL at 21:13

## 2022-06-14 RX ADMIN — Medication 2000 UNIT(S): at 11:50

## 2022-06-14 RX ADMIN — QUETIAPINE FUMARATE 12.5 MILLIGRAM(S): 200 TABLET, FILM COATED ORAL at 21:13

## 2022-06-14 RX ADMIN — SENNA PLUS 2 TABLET(S): 8.6 TABLET ORAL at 21:14

## 2022-06-14 RX ADMIN — ENOXAPARIN SODIUM 40 MILLIGRAM(S): 100 INJECTION SUBCUTANEOUS at 05:37

## 2022-06-14 RX ADMIN — Medication 40 MILLIGRAM(S): at 11:51

## 2022-06-14 RX ADMIN — CALAMINE AND ZINC OXIDE AND PHENOL 1 APPLICATION(S): 160; 10 LOTION TOPICAL at 17:17

## 2022-06-14 RX ADMIN — ROPINIROLE 2 MILLIGRAM(S): 8 TABLET, FILM COATED, EXTENDED RELEASE ORAL at 21:13

## 2022-06-14 RX ADMIN — SODIUM CHLORIDE 75 MILLILITER(S): 9 INJECTION, SOLUTION INTRAVENOUS at 01:02

## 2022-06-14 NOTE — PROGRESS NOTE ADULT - PROBLEM SELECTOR PLAN 5
- wheelchair bound but can transfer and walk a few steps with walker, incontinent   - pt receives Plegridy q 2 weeks - 6/13  - f/u neuro outpatient  - s/p interferon on 5/30 Well controlled. Continue home regimen HCTZ 25mg QD and lisinopril 20mg QD

## 2022-06-14 NOTE — PROGRESS NOTE ADULT - ASSESSMENT
72 yF PMHx vascular Dementia (A+Ox1-2), MDD (hx of SI), HTN, HLD, MS on Plegridy, COPD (not on home O2)  presents from [facility] for unresponsiveness for 10-15 seconds, EEG negative and daughter reports these have happened multiple times without new findings on MRI. Pending tranfer back to [facility] when a bed is available. 72 yF PMHx vascular Dementia (A+Ox1-2), MDD (hx of SI), HTN, HLD, MS on Plegridy, COPD (not on home O2)  presents from [facility] for unresponsiveness for 10-15 seconds, EEG negative and daughter reports these have happened multiple times without new findings on MRI.

## 2022-06-14 NOTE — BH CONSULTATION LIAISON PROGRESS NOTE - NSBHASSESSMENTFT_PSY_ALL_CORE
73 y/o Obese F PMHx Dementia (A+Ox1-2), MDD (hx of SI), T2DM, HTN, HLD, MS, COPD (not on home O2)  presented from [facility] for unresponsiveness. Psych consulted for ongoing follow-up. Pt had de paco period of unresponsiveness at [facility] that warrants medical/neuro evaluations. ECT was being considered but never actually administered. Differential includes medical etiology vs behavioral. Medically cleared after negative evaluations (head imaging, EEG). Responded very well to ECT without need for further inpatient psych care.     Plan:   - Continue usual psychotropics: Prozac 20mg daily, Requip 2mg HS, Seroquel 12.5mg HS, and Ativan 0.5mg PO Q 6PRN anxiety  - Maintain enhanced supervision given impulsive behavior and inability to contract for safety; recommend 7S/Mindful Care Unit if possible  - ECT #7 scheduled for Thursday 6/16; please make NPO overnight on night prior; please obtain COVID test within 72 hours from next ECT  - Will continue to follow  - Dispo: no longer needs inpt psychiatry as she has responded so well to ECT. Can transition to outpt ECT (1 tx/week); will arrange for outpt ECT and insurance authorization. CM to make referrals for NH.

## 2022-06-14 NOTE — PROGRESS NOTE ADULT - PROBLEM SELECTOR PLAN 6
Well controlled. Continue home regimen HCTZ 25mg QD and lisinopril 20mg QD DVT ppx: Lovenox SQ 40mg QD  GOC: DNR/DNI/DNH- MOLST in chart - comfort measures only  Dispo: PT eval   Diet: Regular

## 2022-06-14 NOTE — PROGRESS NOTE ADULT - PROBLEM SELECTOR PLAN 2
- Severe episode of recurrent major depressive disorder, without psychotic features was the reason for her admission to [facility]  - Psych recs appreciated   - Continue Prozac 40 mg daily, Quetiapine 12.5mg qhs  - Planned for ECT #6 on 6/14 while awaiting bed at [facility] - CT head 5/18 and 5/25 with Severe chronic microvascular ischemic changes.  - At bedside patient is A&Ox2-3, awake and alert and follows commands

## 2022-06-14 NOTE — PROGRESS NOTE ADULT - SUBJECTIVE AND OBJECTIVE BOX
Continue Regimen: Retin-A Micro- apply nightly to full face at night \\nAczone- apply to full face every morning PROGRESS NOTE:     Patient is a 72y old  Female who presents with a chief complaint of unresponsiveness (13 Jun 2022 06:39)      SUBJECTIVE / OVERNIGHT EVENTS:    ADDITIONAL REVIEW OF SYSTEMS: 10 point ROS negative except per HPI    MEDICATIONS  (STANDING):  atorvastatin 80 milliGRAM(s) Oral at bedtime  cholecalciferol 2000 Unit(s) Oral daily  enoxaparin Injectable 40 milliGRAM(s) SubCutaneous every 24 hours  FLUoxetine 40 milliGRAM(s) Oral daily  hydrochlorothiazide 25 milliGRAM(s) Oral daily  lactated ringers. 1000 milliLiter(s) (75 mL/Hr) IV Continuous <Continuous>  lisinopril 20 milliGRAM(s) Oral daily  methimazole 5 milliGRAM(s) Oral daily  polyethylene glycol 3350 17 Gram(s) Oral daily  QUEtiapine 12.5 milliGRAM(s) Oral at bedtime  rOPINIRole 2 milliGRAM(s) Oral at bedtime  senna 2 Tablet(s) Oral at bedtime    MEDICATIONS  (PRN):  acetaminophen     Tablet .. 650 milliGRAM(s) Oral every 6 hours PRN Temp greater or equal to 38C (100.4F), Mild Pain (1 - 3)  diphenhydrAMINE 25 milliGRAM(s) Oral every 4 hours PRN Rash and/or Itching  loperamide 2 milliGRAM(s) Oral daily PRN Diarrhea      CAPILLARY BLOOD GLUCOSE        I&O's Summary      PHYSICAL EXAM:  Vital Signs Last 24 Hrs  T(C): 36.8 (14 Jun 2022 05:24), Max: 37.1 (13 Jun 2022 21:27)  T(F): 98.3 (14 Jun 2022 05:24), Max: 98.7 (13 Jun 2022 21:27)  HR: 71 (14 Jun 2022 05:24) (71 - 75)  BP: 103/63 (14 Jun 2022 05:24) (100/65 - 110/56)  BP(mean): --  RR: 17 (14 Jun 2022 05:24) (17 - 17)  SpO2: 100% (14 Jun 2022 05:24) (100% - 100%)    CONSTITUTIONAL: NAD, well-developed, A&Ox3 to person, place, time.  RESPIRATORY: Normal respiratory effort; lungs are clear to auscultation bilaterally  CARDIOVASCULAR: Regular rate and rhythm, normal S1 and S2, no murmur/rub/gallop; No lower extremity edema; Peripheral pulses are 2+ bilaterally  ABDOMEN: Nontender to palpation, no rebound/guarding; No hepatosplenomegaly  MUSCLOSKELETAL: no clubbing or cyanosis of digits; no joint swelling or tenderness to palpation  NEURO: CN 2-12 grossly intact, moves all limbs spontaneously      LABS:                  -----    MICRO      -----    TRENDS    Creatinine Trend: 0.67<--, 0.94<--, 0.89<--, 0.77<--, 0.71<--, 0.74<--  ----  RADIOLOGY & ADDITIONAL TESTS:  Results Reviewed:   Imaging Personally Reviewed:  Electrocardiogram Personally Reviewed:     Detail Level: Zone PROGRESS NOTE:     Patient is a 72y old  Female who presents with a chief complaint of unresponsiveness (13 Jun 2022 06:39)      SUBJECTIVE / OVERNIGHT EVENTS: No acute events overnight. Denies fever, chills, chest pain, nausea vomiting abdominal pain.     ADDITIONAL REVIEW OF SYSTEMS: 10 point ROS negative except per HPI    MEDICATIONS  (STANDING):  atorvastatin 80 milliGRAM(s) Oral at bedtime  cholecalciferol 2000 Unit(s) Oral daily  enoxaparin Injectable 40 milliGRAM(s) SubCutaneous every 24 hours  FLUoxetine 40 milliGRAM(s) Oral daily  hydrochlorothiazide 25 milliGRAM(s) Oral daily  lactated ringers. 1000 milliLiter(s) (75 mL/Hr) IV Continuous <Continuous>  lisinopril 20 milliGRAM(s) Oral daily  methimazole 5 milliGRAM(s) Oral daily  polyethylene glycol 3350 17 Gram(s) Oral daily  QUEtiapine 12.5 milliGRAM(s) Oral at bedtime  rOPINIRole 2 milliGRAM(s) Oral at bedtime  senna 2 Tablet(s) Oral at bedtime    MEDICATIONS  (PRN):  acetaminophen     Tablet .. 650 milliGRAM(s) Oral every 6 hours PRN Temp greater or equal to 38C (100.4F), Mild Pain (1 - 3)  diphenhydrAMINE 25 milliGRAM(s) Oral every 4 hours PRN Rash and/or Itching  loperamide 2 milliGRAM(s) Oral daily PRN Diarrhea      CAPILLARY BLOOD GLUCOSE        I&O's Summary      PHYSICAL EXAM:  Vital Signs Last 24 Hrs  T(C): 36.8 (14 Jun 2022 05:24), Max: 37.1 (13 Jun 2022 21:27)  T(F): 98.3 (14 Jun 2022 05:24), Max: 98.7 (13 Jun 2022 21:27)  HR: 71 (14 Jun 2022 05:24) (71 - 75)  BP: 103/63 (14 Jun 2022 05:24) (100/65 - 110/56)  RR: 17 (14 Jun 2022 05:24) (17 - 17)  SpO2: 100% (14 Jun 2022 05:24) (100% - 100%)      CONSTITUTIONAL: NAD, well-developed, A&Ox3 to person, place, time.  HEENT: NCAT, sclera anicteric, moist mucous membranes  RESPIRATORY: Normal respiratory effort; lungs are clear to auscultation bilaterally  CARDIOVASCULAR: Regular rate and rhythm, holosystolic murmur at the left sternal border, normal S1 and S2, no murmur/rub/gallop  ABDOMEN: Nontender to palpation, no rebound/guarding   MUSCLOSKELETAL: no clubbing or cyanosis of digits; no edema  NEURO: CN 2-12 grossly intact, moves all limbs spontaneously      LABS:                  -----    MICRO      -----    TRENDS    Creatinine Trend: 0.67<--, 0.94<--, 0.89<--, 0.77<--, 0.71<--, 0.74<--  ----  RADIOLOGY & ADDITIONAL TESTS:  Results Reviewed:   Imaging Personally Reviewed:  Electrocardiogram Personally Reviewed:     Otc Regimen: Wash face twice daily with gentle skin cleansers \\nSunblock spf 30 daily Modify Regimen: Spironolactone 25mg - can up the dose to two tabs daily

## 2022-06-14 NOTE — PROGRESS NOTE ADULT - PROBLEM SELECTOR PLAN 3
- CT head 5/18 and 5/25 with Severe chronic microvascular ischemic changes.  - At bedside patient is A&Ox2-3, awake and alert and follows commands - Continue methimazole 5mg qd  - FT3  3.03 on 05/19/2022 (within acceptable range)

## 2022-06-14 NOTE — PROGRESS NOTE ADULT - ATTENDING COMMENTS
The patient is clinically improving and responding to ECT treatement.  She is scheduled for 6/16.  Continue prozac and quetiapine.  F/u echo.

## 2022-06-14 NOTE — PROGRESS NOTE ADULT - PROBLEM SELECTOR PLAN 4
- continue methimazole 5mg qd  - FT3  3.03 on 05/19/2022 (within acceptable range) - Wheelchair bound but can transfer and walk a few steps with walker, incontinent   - pt receives Plegridy q 2 weeks - 6/13  - f/u neuro outpatient  - s/p interferon on 5/30

## 2022-06-14 NOTE — PROGRESS NOTE ADULT - PROBLEM SELECTOR PLAN 1
Presented with unresponsiveness, eye rolling and tongue movements, 10-15 second episode  - c/w atorvastatin 80  - MOLST form documenting Do Not Hospitalize  - 24 hour EEG on 5/26-5/27 Normal  - hold off on MRI per pt daughter/HCP  - TTE to r/o aortic stenosis given systolic murmur   - At this time the patient is medically cleared for discharge. Pending discharge to [facility] awaiting bed availability - Severe episode of recurrent major depressive disorder, without psychotic features was the reason for her admission to [facility]  - Psych recs appreciated   - Continue Prozac 40 mg daily, Quetiapine 12.5mg qhs  - Planned for ECT #6 on 6/14 and 6/16   - Per psych, given that patient is improving, patient no longer needs inpatient psych admission from psychiatry standpoint and can continue ECT treatments as outpatient. Her last ECT session will be 6/16

## 2022-06-15 DIAGNOSIS — I35.0 NONRHEUMATIC AORTIC (VALVE) STENOSIS: ICD-10-CM

## 2022-06-15 LAB — SARS-COV-2 RNA SPEC QL NAA+PROBE: SIGNIFICANT CHANGE UP

## 2022-06-15 PROCEDURE — 99232 SBSQ HOSP IP/OBS MODERATE 35: CPT | Mod: GC

## 2022-06-15 PROCEDURE — 99233 SBSQ HOSP IP/OBS HIGH 50: CPT

## 2022-06-15 RX ORDER — SODIUM CHLORIDE 9 MG/ML
1000 INJECTION, SOLUTION INTRAVENOUS
Refills: 0 | Status: DISCONTINUED | OUTPATIENT
Start: 2022-06-15 | End: 2022-06-18

## 2022-06-15 RX ADMIN — ENOXAPARIN SODIUM 40 MILLIGRAM(S): 100 INJECTION SUBCUTANEOUS at 06:11

## 2022-06-15 RX ADMIN — Medication 40 MILLIGRAM(S): at 11:17

## 2022-06-15 RX ADMIN — LISINOPRIL 20 MILLIGRAM(S): 2.5 TABLET ORAL at 05:18

## 2022-06-15 RX ADMIN — POLYETHYLENE GLYCOL 3350 17 GRAM(S): 17 POWDER, FOR SOLUTION ORAL at 11:17

## 2022-06-15 RX ADMIN — Medication 25 MILLIGRAM(S): at 05:18

## 2022-06-15 RX ADMIN — ROPINIROLE 2 MILLIGRAM(S): 8 TABLET, FILM COATED, EXTENDED RELEASE ORAL at 21:44

## 2022-06-15 RX ADMIN — ATORVASTATIN CALCIUM 80 MILLIGRAM(S): 80 TABLET, FILM COATED ORAL at 21:45

## 2022-06-15 RX ADMIN — QUETIAPINE FUMARATE 12.5 MILLIGRAM(S): 200 TABLET, FILM COATED ORAL at 21:45

## 2022-06-15 RX ADMIN — CALAMINE AND ZINC OXIDE AND PHENOL 1 APPLICATION(S): 160; 10 LOTION TOPICAL at 05:16

## 2022-06-15 RX ADMIN — CALAMINE AND ZINC OXIDE AND PHENOL 1 APPLICATION(S): 160; 10 LOTION TOPICAL at 17:42

## 2022-06-15 RX ADMIN — Medication 2000 UNIT(S): at 11:17

## 2022-06-15 NOTE — BH CONSULTATION LIAISON PROGRESS NOTE - NSBHASSESSMENTFT_PSY_ALL_CORE
73 y/o Obese F PMHx Dementia (A+Ox1-2), MDD (hx of SI), T2DM, HTN, HLD, MS, COPD (not on home O2)  presented from [facility] for unresponsiveness. Psych consulted for ongoing follow-up. Pt had de paco period of unresponsiveness at [facility] that warrants medical/neuro evaluations. ECT was being considered but never actually administered. Differential includes medical etiology vs behavioral. Medically cleared after negative evaluations (head imaging, EEG). Responded very well to ECT without need for further inpatient psych care.     Plan:   - Continue usual psychotropics: Prozac 20mg daily, Requip 2mg HS, Seroquel 12.5mg HS, and Ativan 0.5mg PO Q 6PRN anxiety  - Maintain enhanced supervision given impulsive behavior and inability to contract for safety; recommend 7S/Mindful Care Unit if possible  - ECT #7 scheduled for Thursday 6/16; please make NPO overnight on night prior; please obtain COVID test within 72 hours from next ECT  - Will continue to follow  - Dispo: no longer needs inpt psychiatry as she has responded so well to ECT. Can transition to outpt ECT (1 tx/week); will arrange for outpt ECT and insurance authorization. CM to make referrals for NH. We will work on outpt ECT scheduling and insurance authorization (needed before she leaves)

## 2022-06-15 NOTE — PROGRESS NOTE ADULT - PROBLEM SELECTOR PLAN 4
- Wheelchair bound but can transfer and walk a few steps with walker, incontinent   - pt receives Plegridy q 2 weeks - 6/13  - f/u neuro outpatient  - s/p interferon on 5/30

## 2022-06-15 NOTE — PROGRESS NOTE ADULT - ATTENDING COMMENTS
PT continues to require inpatient ECT per psychiatry, next treatment is tomorrow and then can transition to outpatient treatment.  Continue fluoxetine and seroquel.  Echo with mild-moderate AS; will need annual echo.

## 2022-06-15 NOTE — BH CONSULTATION LIAISON PROGRESS NOTE - MSE UNSTRUCTURED FT
Pt seen, calm, oriented, and in NAD. No focal neuro deficits appreciated. She continues to evidence an improved affect (smiles at times, less constricted). Does not appreciate any improvement subjectively, however. Has limited insight. Open to continued ECT treatments. Denies SI, hopelessness, AVH, safety concerns currently. 
Today is AA, oriented x 3 though has speech latency/bradyphrenia, odd affect, and difficulty with subjective "confusion" about "everything" (rather vague). Does endorse ongoing depression and hopelessness, and says she has suicidal thoughts outside of the hospital but none currently/here. Denied AVH/psychosis/safety concerns. 
On exam, she is AA ox 2 (not to date), cooperative, pleasant, with improved range in affect. Denies SI, HI, or AVH. Open to ongoing ECT. 
On exam is AA O x 2 (cannot recall date), says her mood is "good" and she appears brighter, with a smile. Says she wants to go shopping, which is a sign of a return to her interests. She denies SI, HI, hopelessness, or AVH. Open to continued treatments.
Doing well, AA O x 2-3, in NAD, without focal neuro deficits, tolerating ECT well. Denies side effects. Wants to shop, do things, says she has no depressed mood. Denies SI, HI, AVH, paranoia. 
alert/oriented, pleasant, cooperative, denying depression,confusion, SI, HI, or AVH. 
pt seen, alert, oriented to self/place/context. Asking how long it will take for ECT to help her confusion/depression. She denies any safety concerns/SI/AVH. Continues to endorse mood as "confused" but denies hopelessness, anxiety/panic.
On exam, AA ox 2-3 (doesn't keep track of days), pleasant, cooperative, smiling, denies depression/confusion. Improved affect. Anticipates ECT tomorrow. Denies SI, HI, AVH, psychosis. 
Doing well, no complaints. Continued gains noted (improved affect, speech, and subjective reporting of mood). Denies SI, hopelessness, panic, or psychosis/sameera. Open to continued ECT as outpt. 
Pt seen, remains the same (alert, oriented, though dysphoric in a vague way). Continues to endorse feeling "confused" and "sickened" by life, wanting to die (though denying any acute intent/plan). 
calm, oriented well, and much lighter in affect (giddy almost). Saying she would feel "like a million bucks if they cleaned my hair." Says mood is "OK" (improved from "confused" in recent days). Denies SI, HI, AVH, paranoia. No focal neuro deficits.
On exam, she is rather behavioral, selective, and dramatic, in a way exaggerating her confusion. She stares forward and appears to be editing her responses before emitting them. She did not engage well for full interview, but was eventually able to say it was [facility] and 2022.  and daughter were present.

## 2022-06-15 NOTE — PROGRESS NOTE ADULT - PROBLEM SELECTOR PLAN 1
- Severe episode of recurrent major depressive disorder, without psychotic features was the reason for her admission to [facility]  - Psych recs appreciated   - Continue Prozac 40 mg daily, Quetiapine 12.5mg qhs  - Planned for ECT #6 on 6/14 and 6/16   - Per psych, given that patient is improving, patient no longer needs inpatient psych admission from psychiatry standpoint and can continue ECT treatments as outpatient. Her last ECT session will be 6/16 - Severe episode of recurrent major depressive disorder, without psychotic features was the reason for her admission to [facility]  - Psych recs appreciated   - Continue Prozac 40 mg daily, Quetiapine 12.5mg qhs  - Planned for ECT #6 on 6/14 and 6/16   - Per psych, given that patient is improving, patient no longer needs inpatient psych admission from psychiatry standpoint and can continue ECT treatments as outpatient. Her last ECT session will be 6/16  - Dispo planning

## 2022-06-15 NOTE — BH CONSULTATION LIAISON PROGRESS NOTE - NSCDBILL_PSY_A_CORE
[id number] - Inpatient, high complexity
[id number] - Inpatient, high complexity
[id number] - Inpatient, high complexity
[id number] - Inpatient, high complexity
[id number] - Inpatient, high complexity
[id number] - Inpatient, high complexity
[id number] - Inpatient, high complexity
[id number] - Inpatient, high complexity
[id number] - Inpatient, high complexity
[id number] - Inpatient, high complexity
[id number] - Inpatient, high complexity
[id number] - Inpatient, high complexity

## 2022-06-15 NOTE — PROGRESS NOTE ADULT - ASSESSMENT
72 yF PMHx vascular Dementia (A+Ox1-2), MDD (hx of SI), HTN, HLD, MS on Plegridy, COPD (not on home O2)  presents from [facility] for unresponsiveness for 10-15 seconds, EEG negative and daughter reports these have happened multiple times without new findings on MRI.

## 2022-06-15 NOTE — PROGRESS NOTE ADULT - PROBLEM SELECTOR PLAN 6
DVT ppx: Lovenox SQ 40mg QD  GOC: DNR/DNI/DNH- MOLST in chart - comfort measures only  Dispo: PT eval   Diet: Regular DVT ppx: Lovenox SQ 40mg QD  GOC: DNR/DNI/DNH- MOLST in chart - comfort measures only  Dispo: Return to former [facility] ([facility] pending outpatient ETC scheduling/authorization  Diet: Regular

## 2022-06-15 NOTE — PROGRESS NOTE ADULT - SUBJECTIVE AND OBJECTIVE BOX
PROGRESS NOTE:     Patient is a 72y old  Female who presents with a chief complaint of unresponsiveness (14 Jun 2022 06:47)      SUBJECTIVE / OVERNIGHT EVENTS:    ADDITIONAL REVIEW OF SYSTEMS: 10 point ROS negative except per HPI    MEDICATIONS  (STANDING):  atorvastatin 80 milliGRAM(s) Oral at bedtime  calamine/zinc oxide Lotion 1 Application(s) Topical two times a day  cholecalciferol 2000 Unit(s) Oral daily  enoxaparin Injectable 40 milliGRAM(s) SubCutaneous every 24 hours  FLUoxetine 40 milliGRAM(s) Oral daily  hydrochlorothiazide 25 milliGRAM(s) Oral daily  lactated ringers. 1000 milliLiter(s) (75 mL/Hr) IV Continuous <Continuous>  lisinopril 20 milliGRAM(s) Oral daily  methimazole 5 milliGRAM(s) Oral daily  polyethylene glycol 3350 17 Gram(s) Oral daily  QUEtiapine 12.5 milliGRAM(s) Oral at bedtime  rOPINIRole 2 milliGRAM(s) Oral at bedtime  senna 2 Tablet(s) Oral at bedtime    MEDICATIONS  (PRN):  acetaminophen     Tablet .. 650 milliGRAM(s) Oral every 6 hours PRN Temp greater or equal to 38C (100.4F), Mild Pain (1 - 3)  diphenhydrAMINE 25 milliGRAM(s) Oral every 4 hours PRN Rash and/or Itching  loperamide 2 milliGRAM(s) Oral daily PRN Diarrhea      CAPILLARY BLOOD GLUCOSE        I&O's Summary      PHYSICAL EXAM:  Vital Signs Last 24 Hrs  T(C): 37.4 (15 Eber 2022 05:05), Max: 37.4 (15 Eber 2022 05:05)  T(F): 99.4 (15 Eber 2022 05:05), Max: 99.4 (15 Eber 2022 05:05)  HR: 76 (15 Eber 2022 05:05) (68 - 81)  BP: 124/60 (15 Eber 2022 05:05) (123/60 - 144/87)  BP(mean): --  RR: 17 (15 Eber 2022 05:05) (17 - 18)  SpO2: 96% (15 Eber 2022 05:05) (96% - 99%)    CONSTITUTIONAL: NAD, well-developed, A&Ox3 to person, place, time.  RESPIRATORY: Normal respiratory effort; lungs are clear to auscultation bilaterally  CARDIOVASCULAR: Regular rate and rhythm, normal S1 and S2, no murmur/rub/gallop; No lower extremity edema; Peripheral pulses are 2+ bilaterally  ABDOMEN: Nontender to palpation, no rebound/guarding; No hepatosplenomegaly  MUSCLOSKELETAL: no clubbing or cyanosis of digits; no joint swelling or tenderness to palpation  NEURO: CN 2-12 grossly intact, moves all limbs spontaneously      LABS:                  -----    MICRO      -----    TRENDS    Creatinine Trend: 0.67<--, 0.94<--, 0.89<--, 0.77<--, 0.71<--, 0.74<--  ----  RADIOLOGY & ADDITIONAL TESTS:  Results Reviewed:   Imaging Personally Reviewed:  Electrocardiogram Personally Reviewed:    COORDINATION OF CARE:  Care Discussed with Consultants/Other Providers [Y/N]:  Prior or Outpatient Records Reviewed [Y/N]:   PROGRESS NOTE:     Patient is a 72y old  Female who presents with a chief complaint of unresponsiveness (14 Jun 2022 06:47)      SUBJECTIVE / OVERNIGHT EVENTS: No acute events overnight. Denies fever, chills, chest pain, nausea vomiting abdominal pain. She reports skin itchiness at the site of EKG stickers, amenable to try calamine lotion.       ADDITIONAL REVIEW OF SYSTEMS: 10 point ROS negative except per HPI    MEDICATIONS  (STANDING):  atorvastatin 80 milliGRAM(s) Oral at bedtime  calamine/zinc oxide Lotion 1 Application(s) Topical two times a day  cholecalciferol 2000 Unit(s) Oral daily  enoxaparin Injectable 40 milliGRAM(s) SubCutaneous every 24 hours  FLUoxetine 40 milliGRAM(s) Oral daily  hydrochlorothiazide 25 milliGRAM(s) Oral daily  lactated ringers. 1000 milliLiter(s) (75 mL/Hr) IV Continuous <Continuous>  lisinopril 20 milliGRAM(s) Oral daily  methimazole 5 milliGRAM(s) Oral daily  polyethylene glycol 3350 17 Gram(s) Oral daily  QUEtiapine 12.5 milliGRAM(s) Oral at bedtime  rOPINIRole 2 milliGRAM(s) Oral at bedtime  senna 2 Tablet(s) Oral at bedtime    MEDICATIONS  (PRN):  acetaminophen     Tablet .. 650 milliGRAM(s) Oral every 6 hours PRN Temp greater or equal to 38C (100.4F), Mild Pain (1 - 3)  diphenhydrAMINE 25 milliGRAM(s) Oral every 4 hours PRN Rash and/or Itching  loperamide 2 milliGRAM(s) Oral daily PRN Diarrhea      CAPILLARY BLOOD GLUCOSE        I&O's Summary      PHYSICAL EXAM:  Vital Signs Last 24 Hrs  T(C): 37.4 (15 Eber 2022 05:05), Max: 37.4 (15 Eber 2022 05:05)  T(F): 99.4 (15 Eber 2022 05:05), Max: 99.4 (15 Eber 2022 05:05)  HR: 76 (15 Eber 2022 05:05) (68 - 81)  BP: 124/60 (15 Eber 2022 05:05) (123/60 - 144/87)  RR: 17 (15 Eber 2022 05:05) (17 - 18)  SpO2: 96% (15 Eber 2022 05:05) (96% - 99%)    CONSTITUTIONAL: NAD, well-developed, A&Ox3 to person, place, time.  HEENT: NCAT, sclera anicteric, moist mucous membranes  RESPIRATORY: Normal respiratory effort; lungs are clear to auscultation bilaterally  CARDIOVASCULAR: Regular rate and rhythm, holosystolic murmur at the left sternal border, normal S1 and S2, no murmur/rub/gallop  ABDOMEN: Nontender to palpation, no rebound/guarding   MUSCLOSKELETAL: no clubbing or cyanosis of digits; no edema  NEURO: CN 2-12 grossly intact, moves all limbs spontaneously        LABS:                  -----    MICRO      -----    TRENDS    Creatinine Trend: 0.67<--, 0.94<--, 0.89<--, 0.77<--, 0.71<--, 0.74<--  ----  RADIOLOGY & ADDITIONAL TESTS:  Results Reviewed:   Imaging Personally Reviewed:  Electrocardiogram Personally Reviewed:    COORDINATION OF CARE:  Care Discussed with Consultants/Other Providers [Y/N]:  Prior or Outpatient Records Reviewed [Y/N]:

## 2022-06-15 NOTE — PROGRESS NOTE ADULT - PROBLEM SELECTOR PLAN 2
- CT head 5/18 and 5/25 with Severe chronic microvascular ischemic changes.  - At bedside patient is A&Ox2-3, awake and alert and follows commands - Holosystolic murmur on exam. TTE revealed mild to moderate aortic stenosis, EF normal 65%  - Recommend TTE every year to monitor

## 2022-06-16 PROCEDURE — 90870 ELECTROCONVULSIVE THERAPY: CPT

## 2022-06-16 PROCEDURE — 99232 SBSQ HOSP IP/OBS MODERATE 35: CPT | Mod: FS

## 2022-06-16 PROCEDURE — 99232 SBSQ HOSP IP/OBS MODERATE 35: CPT | Mod: GC

## 2022-06-16 RX ADMIN — ATORVASTATIN CALCIUM 80 MILLIGRAM(S): 80 TABLET, FILM COATED ORAL at 21:57

## 2022-06-16 RX ADMIN — SODIUM CHLORIDE 100 MILLILITER(S): 9 INJECTION, SOLUTION INTRAVENOUS at 00:10

## 2022-06-16 RX ADMIN — QUETIAPINE FUMARATE 12.5 MILLIGRAM(S): 200 TABLET, FILM COATED ORAL at 21:56

## 2022-06-16 RX ADMIN — CALAMINE AND ZINC OXIDE AND PHENOL 1 APPLICATION(S): 160; 10 LOTION TOPICAL at 05:50

## 2022-06-16 RX ADMIN — Medication 25 MILLIGRAM(S): at 05:48

## 2022-06-16 RX ADMIN — ENOXAPARIN SODIUM 40 MILLIGRAM(S): 100 INJECTION SUBCUTANEOUS at 05:48

## 2022-06-16 RX ADMIN — Medication 40 MILLIGRAM(S): at 12:21

## 2022-06-16 RX ADMIN — Medication 2000 UNIT(S): at 12:21

## 2022-06-16 RX ADMIN — LISINOPRIL 20 MILLIGRAM(S): 2.5 TABLET ORAL at 05:48

## 2022-06-16 RX ADMIN — POLYETHYLENE GLYCOL 3350 17 GRAM(S): 17 POWDER, FOR SOLUTION ORAL at 12:21

## 2022-06-16 RX ADMIN — ROPINIROLE 2 MILLIGRAM(S): 8 TABLET, FILM COATED, EXTENDED RELEASE ORAL at 21:57

## 2022-06-16 NOTE — BH CONSULTATION LIAISON PROGRESS NOTE - NSICDXBHPRIMARYDX_PSY_ALL_CORE
Major depressive episode   F32.9  

## 2022-06-16 NOTE — BH CONSULTATION LIAISON PROGRESS NOTE - NSBHADMITIPOBS_PSY_A_CORE
Routine observation
Enhanced supervision
Routine observation

## 2022-06-16 NOTE — BH CONSULTATION LIAISON PROGRESS NOTE - NSBHASSESSMENTFT_PSY_ALL_CORE
71 y/o Obese F PMHx Dementia (A+Ox1-2), MDD (hx of SI), T2DM, HTN, HLD, MS, COPD (not on home O2)  presented from [facility] for unresponsiveness. Psych consulted for ongoing follow-up. Pt had de paco period of unresponsiveness at [facility] that warrants medical/neuro evaluations. ECT was being considered but never actually administered. Differential includes medical etiology vs behavioral. Medically cleared after negative evaluations (head imaging, EEG). Responded very well to ECT without need for further inpatient psych care.     6/16: improved mood. no SI or HI. tolerating ECT well.     Plan:   - Continue usual psychotropics: Prozac 20mg daily, Requip 2mg HS, Seroquel 12.5mg HS, and Ativan 0.5mg PO Q 6PRN anxiety  - No si or HI, has hx of impulsivity - can increase level of observation as needed  - completed ECT #7  Thursday 6/16  - Will continue to follow  - Dispo: no longer needs inpt psychiatry as she has responded so well to ECT. Can transition to outpt ECT (1 tx/week); will arrange for outpt ECT and insurance authorization. CM to make referrals for NH. We will work on outpt ECT scheduling and insurance authorization (needed before she leaves) 73 y/o Obese F PMHx Dementia (A+Ox1-2), MDD (hx of SI), T2DM, HTN, HLD, MS, COPD (not on home O2)  presented from [facility] for unresponsiveness. Psych consulted for ongoing follow-up. Pt had de paco period of unresponsiveness at [facility] that warrants medical/neuro evaluations. ECT was being considered but never actually administered. Differential includes medical etiology vs behavioral. Medically cleared after negative evaluations (head imaging, EEG). Responded very well to ECT without need for further inpatient psych care.     6/16: improved mood. no SI or HI. tolerating ECT well.     Plan:   PLEASE GET CBC and BMP labs on 6/17  - Continue usual psychotropics: Prozac 20mg daily, Requip 2mg HS, Seroquel 12.5mg HS, and Ativan 0.5mg PO Q 6PRN anxiety  - No si or HI, has hx of impulsivity - can increase level of observation as needed  - completed ECT #7  Thursday 6/16  - Will continue to follow  - Dispo: no longer needs inpt psychiatry as she has responded so well to ECT. Can transition to outpt ECT (1 tx/week); will arrange for outpt ECT and insurance authorization. CM to make referrals for NH. We will work on outpt ECT scheduling and insurance authorization (needed before she leaves)

## 2022-06-16 NOTE — PROGRESS NOTE ADULT - PROBLEM SELECTOR PLAN 1
- Severe episode of recurrent major depressive disorder, without psychotic features was the reason for her admission to [facility]  - Psych recs appreciated   - Continue Prozac 40 mg daily, Quetiapine 12.5mg qhs  - Planned for ECT #6 on 6/14 and 6/16   - Per psych, given that patient is improving, patient no longer needs inpatient psych admission from psychiatry standpoint and can continue ECT treatments as outpatient. Her last ECT session will be 6/16  - Dispo planning - Severe episode of recurrent major depressive disorder, without psychotic features was the reason for her admission to [facility]  - Psych recs appreciated   - Continue Prozac 40 mg daily, Quetiapine 12.5mg qhs  - Planned for ECT #6 on 6/14 and 6/16   - Per psych, given that patient is improving, patient no longer needs inpatient psych admission from psychiatry standpoint and can continue ECT treatments as outpatient. Her last ECT session will be 6/16  - Dispo planning: Awaiting ECT authorization and scheduling, LTAC placement

## 2022-06-16 NOTE — PROGRESS NOTE ADULT - SUBJECTIVE AND OBJECTIVE BOX
PROGRESS NOTE:     Patient is a 72y old  Female who presents with a chief complaint of unresponsiveness (15 Eber 2022 06:55)      SUBJECTIVE / OVERNIGHT EVENTS:    ADDITIONAL REVIEW OF SYSTEMS: 10 point ROS negative except per HPI    MEDICATIONS  (STANDING):  atorvastatin 80 milliGRAM(s) Oral at bedtime  calamine/zinc oxide Lotion 1 Application(s) Topical two times a day  cholecalciferol 2000 Unit(s) Oral daily  enoxaparin Injectable 40 milliGRAM(s) SubCutaneous every 24 hours  FLUoxetine 40 milliGRAM(s) Oral daily  hydrochlorothiazide 25 milliGRAM(s) Oral daily  lactated ringers. 1000 milliLiter(s) (75 mL/Hr) IV Continuous <Continuous>  lactated ringers. 1000 milliLiter(s) (100 mL/Hr) IV Continuous <Continuous>  lisinopril 20 milliGRAM(s) Oral daily  methimazole 5 milliGRAM(s) Oral daily  polyethylene glycol 3350 17 Gram(s) Oral daily  QUEtiapine 12.5 milliGRAM(s) Oral at bedtime  rOPINIRole 2 milliGRAM(s) Oral at bedtime  senna 2 Tablet(s) Oral at bedtime    MEDICATIONS  (PRN):  acetaminophen     Tablet .. 650 milliGRAM(s) Oral every 6 hours PRN Temp greater or equal to 38C (100.4F), Mild Pain (1 - 3)  diphenhydrAMINE 25 milliGRAM(s) Oral every 4 hours PRN Rash and/or Itching  loperamide 2 milliGRAM(s) Oral daily PRN Diarrhea      CAPILLARY BLOOD GLUCOSE        I&O's Summary      PHYSICAL EXAM:  Vital Signs Last 24 Hrs  T(C): 36.4 (16 Jun 2022 05:40), Max: 36.9 (15 Eber 2022 13:25)  T(F): 97.6 (16 Jun 2022 05:40), Max: 98.4 (15 Eber 2022 13:25)  HR: 65 (16 Jun 2022 05:40) (65 - 82)  BP: 129/62 (16 Jun 2022 05:40) (104/52 - 132/70)  BP(mean): --  RR: 18 (16 Jun 2022 05:40) (18 - 18)  SpO2: 96% (16 Jun 2022 05:40) (96% - 100%)    CONSTITUTIONAL: NAD, well-developed, A&Ox3 to person, place, time.  RESPIRATORY: Normal respiratory effort; lungs are clear to auscultation bilaterally  CARDIOVASCULAR: Regular rate and rhythm, normal S1 and S2, no murmur/rub/gallop; No lower extremity edema; Peripheral pulses are 2+ bilaterally  ABDOMEN: Nontender to palpation, no rebound/guarding; No hepatosplenomegaly  MUSCLOSKELETAL: no clubbing or cyanosis of digits; no joint swelling or tenderness to palpation  NEURO: CN 2-12 grossly intact, moves all limbs spontaneously      LABS:                  -----    MICRO      -----    TRENDS    Creatinine Trend: 0.67<--, 0.94<--, 0.89<--, 0.77<--, 0.71<--, 0.74<--  ----  RADIOLOGY & ADDITIONAL TESTS:  Results Reviewed:   Imaging Personally Reviewed:  Electrocardiogram Personally Reviewed:    COORDINATION OF CARE:  Care Discussed with Consultants/Other Providers [Y/N]:  Prior or Outpatient Records Reviewed [Y/N]:   PROGRESS NOTE:     Patient is a 72y old  Female who presents with a chief complaint of unresponsiveness (15 Eber 2022 06:55)      SUBJECTIVE / OVERNIGHT EVENTS: No acute events overnight. Denies fever, chills, chest pain, nausea vomiting abdominal pain. ECT today.     ADDITIONAL REVIEW OF SYSTEMS: 10 point ROS negative except per HPI    MEDICATIONS  (STANDING):  atorvastatin 80 milliGRAM(s) Oral at bedtime  calamine/zinc oxide Lotion 1 Application(s) Topical two times a day  cholecalciferol 2000 Unit(s) Oral daily  enoxaparin Injectable 40 milliGRAM(s) SubCutaneous every 24 hours  FLUoxetine 40 milliGRAM(s) Oral daily  hydrochlorothiazide 25 milliGRAM(s) Oral daily  lactated ringers. 1000 milliLiter(s) (75 mL/Hr) IV Continuous <Continuous>  lactated ringers. 1000 milliLiter(s) (100 mL/Hr) IV Continuous <Continuous>  lisinopril 20 milliGRAM(s) Oral daily  methimazole 5 milliGRAM(s) Oral daily  polyethylene glycol 3350 17 Gram(s) Oral daily  QUEtiapine 12.5 milliGRAM(s) Oral at bedtime  rOPINIRole 2 milliGRAM(s) Oral at bedtime  senna 2 Tablet(s) Oral at bedtime    MEDICATIONS  (PRN):  acetaminophen     Tablet .. 650 milliGRAM(s) Oral every 6 hours PRN Temp greater or equal to 38C (100.4F), Mild Pain (1 - 3)  diphenhydrAMINE 25 milliGRAM(s) Oral every 4 hours PRN Rash and/or Itching  loperamide 2 milliGRAM(s) Oral daily PRN Diarrhea      CAPILLARY BLOOD GLUCOSE        I&O's Summary      PHYSICAL EXAM:  Vital Signs Last 24 Hrs  T(C): 36.4 (16 Jun 2022 05:40), Max: 36.9 (15 Eber 2022 13:25)  T(F): 97.6 (16 Jun 2022 05:40), Max: 98.4 (15 Eber 2022 13:25)  HR: 65 (16 Jun 2022 05:40) (65 - 82)  BP: 129/62 (16 Jun 2022 05:40) (104/52 - 132/70)  RR: 18 (16 Jun 2022 05:40) (18 - 18)  SpO2: 96% (16 Jun 2022 05:40) (96% - 100%)    CONSTITUTIONAL: NAD, well-developed, A&Ox3 to person, place, time.  HEENT: NCAT, sclera anicteric, moist mucous membranes  RESPIRATORY: Normal respiratory effort; lungs are clear to auscultation bilaterally  CARDIOVASCULAR: Regular rate and rhythm, holosystolic murmur at the left sternal border, normal S1 and S2, no murmur/rub/gallop  ABDOMEN: Nontender to palpation, no rebound/guarding   MUSCLOSKELETAL: no clubbing or cyanosis of digits; no edema  NEURO: CN 2-12 grossly intact, moves all limbs spontaneously    LABS:                  -----    MICRO      -----    TRENDS    Creatinine Trend: 0.67<--, 0.94<--, 0.89<--, 0.77<--, 0.71<--, 0.74<--  ----  RADIOLOGY & ADDITIONAL TESTS:  Results Reviewed:   Imaging Personally Reviewed:  Electrocardiogram Personally Reviewed:    COORDINATION OF CARE:  Care Discussed with Consultants/Other Providers [Y/N]:  Prior or Outpatient Records Reviewed [Y/N]:

## 2022-06-16 NOTE — BH CONSULTATION LIAISON PROGRESS NOTE - NSBHFUPINTERVALCCFT_PSY_A_CORE
"I'm OK"
"I'm so confused"
"I'm sickened"
"I'm OK"
"I'm OK"
"I'm fine"
"I want my hair cleaned"
"im not depressed at all today, im mellow" 
"I don't know"
"I'm better"
"I'm great"
"I'm good"
"I'm confused about life"

## 2022-06-16 NOTE — PROGRESS NOTE ADULT - PROBLEM SELECTOR PLAN 6
DVT ppx: Lovenox SQ 40mg QD  GOC: DNR/DNI/DNH- MOLST in chart - comfort measures only  Dispo: Return to former [facility] ([facility] pending outpatient ETC scheduling/authorization  Diet: Regular

## 2022-06-16 NOTE — PROGRESS NOTE ADULT - ASSESSMENT
72 yF PMHx vascular Dementia (A+Ox1-2), MDD (hx of SI), HTN, HLD, MS on Plegridy, COPD (not on home O2)  presents from [facility] for unresponsiveness for 10-15 seconds, EEG negative and daughter reports these have happened multiple times without new findings on MRI.

## 2022-06-16 NOTE — BH CONSULTATION LIAISON PROGRESS NOTE - NSBHFUPREASONCONS_PSY_A_CORE
depression
delirium/depression
depression
depression

## 2022-06-16 NOTE — BH CONSULTATION LIAISON PROGRESS NOTE - NSBHFUPINTERVALHXFT_PSY_A_CORE
Chart reviewed. Pt received ECT #1 yesterday, well tolerated. Today is AA, oriented x 3 though has speech latency/bradyphrenia, odd affect, and difficulty with subjective "confusion" about "everything" (rather vague). Does endorse ongoing depression and hopelessness, and says she has suicidal thoughts outside of the hospital but none currently/here. Denied AVH/psychosis/safety concerns. 
Chart reviewed. pt seen, alert, oriented to self/place/context. Asking how long it will take for ECT to help her confusion/depression. She denies any safety concerns/SI/AVH. Continues to endorse mood as "confused" but denies hopelessness, anxiety/panic.
Chart reviewed. Doing well, AA O x 2-3, in NAD, without focal neuro deficits, tolerating ECT well. Denies side effects. Wants to shop, do things, says she has no depressed mood. Denies SI, HI, AVH, paranoia. Open to continued ECT as scheduled
Chart reviewed. EEG negative for seizure. Work-up largely complete, as described by medical team. Receiving Prozac, Seroquel, Requip as ordered. On exam, she is rather behavioral, selective, and dramatic, in a way exaggerating her confusion. She stares forward and appears to be editing her responses before emitting them. She did not engage well for full interview, but was eventually able to say it was [facility] and 2022.  and daughter were present, who agreed that this is largely behavioral and agree with returning pt to [facility] for ongoing ECT. 
Chart reviewed. On exam, AA ox 2-3 (doesn't keep track of days), pleasant, cooperative, smiling, denies depression/confusion. Improved affect. Anticipates ECT tomorrow. Denies SI, HI, AVH, psychosis. 
Chart reviewed. Received ECT this AM, tolerated it well. Seen after, was alert/oriented, pleasant, cooperative, denying depression,confusion, SI, HI, or AVH. Discussed with ECT team who wishes to transition to outpt model, 1 tx per week after this Thursday session. We agree that she does not need inpatient psych any longer. 
Chart reviewed. Pt seen s/p ECT; calm, oriented well, and much lighter in affect (giddy almost). Saying she would feel "like a million bucks if they cleaned my hair." Says mood is "OK" (improved from "confused" in recent days). Denies SI, HI, AVH, paranoia. No focal neuro deficits.
Chart reviewed. Pt seen, calm, oriented, and in NAD. No focal neuro deficits appreciated. She continues to evidence an improved affect (smiles at times, less constricted). Does not appreciate any improvement subjectively, however. Has limited insight. Open to continued ECT treatments. Denies SI, hopelessness, AVH, safety concerns currently. 
Chart reviewed. Pt seen, remains the same (alert, oriented, though dysphoric in a vague way). Continues to endorse feeling "confused" and "sickened" by life, wanting to die (though denying any acute intent/plan). Understands she is attempting ECT and understands there are many treatments left before giving up on the modality. 
Chart reviewed. Echo only shows mild aortic stenosis. Doing well, no complaints. Continued gains noted (improved affect, speech, and subjective reporting of mood). Denies SI, hopelessness, panic, or psychosis/sameera. Open to continued ECT as outpt. Discussed this with both family and with ECT team. 
Chart reviewed. Pt received ECT today, tolerated well. On exam is AA O x 2 (cannot recall date), says her mood is "good" and she appears brighter, with a smile. Says she wants to go shopping, which is a sign of a return to her interests. She denies SI, HI, hopelessness, or AVH. Open to continued treatments. 
Chart reviewed. Taking usual meds, no PRNs for behavior. On exam, she is AA ox 2 (not to date), cooperative, pleasant, with improved range in affect. Denies SI, HI, or AVH. Open to ongoing ECT. 
Patient was seen and assessed at bedside,  at bedside as well. Patient is calm, cooperative, alert, and oriented. States she is not feeling depressed today and has not had suicidal thoughts. Feels well after ECT this morning and is hopeful that with ongoing treatment her symptoms will be well managed. Patient and  both discussed an end goal of going home after a stay at a NH with ECT outpatient treatment. No psychosis. no sameera. no HI

## 2022-06-16 NOTE — BH CONSULTATION LIAISON PROGRESS NOTE - NSBHPTASSESSDT_PSY_A_CORE
01-Jun-2022 14:10
08-Jun-2022 14:36
10-Eber-2022 10:25
03-Jun-2022 14:04
09-Jun-2022 11:13
14-Jun-2022 13:39
27-May-2022 13:39
13-Jun-2022 11:50
02-Jun-2022 13:34
06-Jun-2022 11:59
07-Jun-2022 12:57
15-Eber-2022 14:36
16-Jun-2022 13:06

## 2022-06-16 NOTE — BH CONSULTATION LIAISON PROGRESS NOTE - MSE OPTIONS
Unstructured MSE
Structured MSE

## 2022-06-16 NOTE — BH CONSULTATION LIAISON PROGRESS NOTE - CASE SUMMARY
Patient seen and examined with DORA Lucas. I agree with her history, MSE, A/P. Patient appears euthymic with good range of affect and linear thought process. Denies SI/HI. Denies AH/VH. At this point, does not meet criteria for return to [facility]. In the process of setting up outpatient ECT. Plan per above.

## 2022-06-16 NOTE — BH CONSULTATION LIAISON PROGRESS NOTE - NSBHCHARTREVIEWVS_PSY_A_CORE FT
Vital Signs Last 24 Hrs  T(C): 36.4 (16 Jun 2022 05:40), Max: 36.9 (15 Eber 2022 13:25)  T(F): 97.6 (16 Jun 2022 05:40), Max: 98.4 (15 Eber 2022 13:25)  HR: 65 (16 Jun 2022 05:40) (65 - 82)  BP: 129/62 (16 Jun 2022 05:40) (104/52 - 132/70)  BP(mean): --  RR: 18 (16 Jun 2022 05:40) (18 - 18)  SpO2: 96% (16 Jun 2022 05:40) (96% - 100%)
Vital Signs Last 24 Hrs  T(C): 36.5 (03 Jun 2022 12:00), Max: 36.6 (02 Jun 2022 21:23)  T(F): 97.7 (03 Jun 2022 12:00), Max: 97.9 (02 Jun 2022 21:23)  HR: 64 (03 Jun 2022 12:00) (64 - 71)  BP: 110/61 (03 Jun 2022 12:00) (110/61 - 120/72)  BP(mean): --  RR: 16 (03 Jun 2022 12:00) (16 - 18)  SpO2: 98% (03 Jun 2022 12:00) (96% - 98%)
Vital Signs Last 24 Hrs  T(C): 36.8 (07 Jun 2022 12:43), Max: 36.8 (06 Jun 2022 21:51)  T(F): 98.2 (07 Jun 2022 12:43), Max: 98.2 (06 Jun 2022 21:51)  HR: 63 (07 Jun 2022 12:43) (63 - 74)  BP: 114/61 (07 Jun 2022 12:43) (107/73 - 117/65)  BP(mean): --  RR: 18 (07 Jun 2022 12:43) (17 - 18)  SpO2: 99% (07 Jun 2022 12:43) (98% - 99%)
Vital Signs Last 24 Hrs  T(C): 36.4 (08 Jun 2022 12:30), Max: 36.8 (07 Jun 2022 22:08)  T(F): 97.5 (08 Jun 2022 12:30), Max: 98.3 (07 Jun 2022 22:08)  HR: 64 (08 Jun 2022 12:30) (64 - 72)  BP: 102/60 (08 Jun 2022 12:30) (102/60 - 149/77)  BP(mean): --  RR: 17 (08 Jun 2022 12:30) (17 - 18)  SpO2: 99% (08 Jun 2022 12:30) (98% - 99%)
Vital Signs Last 24 Hrs  T(C): 36.2 (09 Jun 2022 05:31), Max: 36.8 (08 Jun 2022 21:26)  T(F): 97.1 (09 Jun 2022 05:31), Max: 98.3 (08 Jun 2022 21:26)  HR: 66 (09 Jun 2022 05:31) (64 - 69)  BP: 130/63 (09 Jun 2022 05:31) (102/60 - 130/63)  BP(mean): --  RR: 18 (09 Jun 2022 05:31) (17 - 18)  SpO2: 98% (09 Jun 2022 05:31) (98% - 99%)
Vital Signs Last 24 Hrs  T(C): 37.1 (06 Jun 2022 05:19), Max: 37.1 (06 Jun 2022 05:19)  T(F): 98.8 (06 Jun 2022 05:19), Max: 98.8 (06 Jun 2022 05:19)  HR: 66 (06 Jun 2022 05:19) (66 - 70)  BP: 123/56 (06 Jun 2022 05:19) (110/60 - 123/56)  BP(mean): --  RR: 16 (06 Jun 2022 05:19) (16 - 18)  SpO2: 98% (06 Jun 2022 05:19) (98% - 98%)
Vital Signs Last 24 Hrs  T(C): 36.9 (02 Jun 2022 12:07), Max: 37 (01 Jun 2022 21:20)  T(F): 98.4 (02 Jun 2022 12:07), Max: 98.6 (01 Jun 2022 21:20)  HR: 62 (02 Jun 2022 12:07) (62 - 73)  BP: 122/62 (02 Jun 2022 12:07) (103/61 - 125/69)  BP(mean): --  RR: 18 (02 Jun 2022 12:07) (18 - 18)  SpO2: 95% (02 Jun 2022 12:07) (95% - 100%)
Vital Signs Last 24 Hrs  T(C): 36.9 (15 Eber 2022 13:25), Max: 37.4 (15 Eber 2022 05:05)  T(F): 98.4 (15 Eber 2022 13:25), Max: 99.4 (15 Eber 2022 05:05)  HR: 71 (15 Eber 2022 13:25) (71 - 81)  BP: 104/52 (15 Eber 2022 13:25) (104/52 - 144/87)  BP(mean): --  RR: 18 (15 Eber 2022 13:25) (17 - 18)  SpO2: 100% (15 Eber 2022 13:25) (96% - 100%)
Vital Signs Last 24 Hrs  T(C): 36.9 (14 Jun 2022 12:38), Max: 37.1 (13 Jun 2022 21:27)  T(F): 98.4 (14 Jun 2022 12:38), Max: 98.7 (13 Jun 2022 21:27)  HR: 68 (14 Jun 2022 12:38) (68 - 71)  BP: 123/60 (14 Jun 2022 12:38) (103/63 - 123/60)  BP(mean): --  RR: 18 (14 Jun 2022 12:38) (17 - 18)  SpO2: 99% (14 Jun 2022 12:38) (99% - 100%)
Vital Signs Last 24 Hrs  T(C): 36.5 (13 Jun 2022 05:15), Max: 37.1 (12 Jun 2022 21:28)  T(F): 97.7 (13 Jun 2022 05:15), Max: 98.8 (12 Jun 2022 21:28)  HR: 64 (13 Jun 2022 05:15) (64 - 73)  BP: 129/67 (13 Jun 2022 05:15) (107/53 - 129/67)  BP(mean): --  RR: 17 (13 Jun 2022 05:15) (17 - 17)  SpO2: 97% (13 Jun 2022 05:15) (97% - 99%)
Vital Signs Last 24 Hrs  T(C): 37.1 (27 May 2022 05:47), Max: 37.2 (26 May 2022 14:19)  T(F): 98.7 (27 May 2022 05:47), Max: 99 (26 May 2022 14:19)  HR: 65 (27 May 2022 05:47) (59 - 73)  BP: 145/73 (27 May 2022 05:47) (133/49 - 146/75)  BP(mean): --  RR: 18 (27 May 2022 05:47) (18 - 18)  SpO2: 99% (27 May 2022 05:47) (97% - 99%)
Vital Signs Last 24 Hrs  T(C): 36.9 (01 Jun 2022 13:25), Max: 36.9 (01 Jun 2022 13:25)  T(F): 98.5 (01 Jun 2022 13:25), Max: 98.5 (01 Jun 2022 13:25)  HR: 74 (01 Jun 2022 13:25) (73 - 75)  BP: 106/62 (01 Jun 2022 13:25) (106/62 - 128/66)  BP(mean): --  RR: 18 (01 Jun 2022 13:25) (17 - 18)  SpO2: 96% (01 Jun 2022 13:25) (96% - 98%)
Vital Signs Last 24 Hrs  T(C): 36.4 (10 Eber 2022 06:10), Max: 36.8 (09 Jun 2022 21:40)  T(F): 97.6 (10 Eber 2022 06:10), Max: 98.2 (09 Jun 2022 21:40)  HR: 62 (10 Eber 2022 06:10) (62 - 73)  BP: 107/53 (10 Eber 2022 06:10) (106/64 - 129/66)  BP(mean): --  RR: 18 (10 Eber 2022 06:10) (17 - 18)  SpO2: 98% (10 Eber 2022 06:10) (98% - 98%)

## 2022-06-16 NOTE — BH CONSULTATION LIAISON PROGRESS NOTE - CURRENT MEDICATION
MEDICATIONS  (STANDING):  atorvastatin 80 milliGRAM(s) Oral at bedtime  cholecalciferol 2000 Unit(s) Oral daily  enoxaparin Injectable 40 milliGRAM(s) SubCutaneous every 24 hours  FLUoxetine 40 milliGRAM(s) Oral daily  hydrochlorothiazide 25 milliGRAM(s) Oral daily  lisinopril 20 milliGRAM(s) Oral daily  methimazole 5 milliGRAM(s) Oral daily  polyethylene glycol 3350 17 Gram(s) Oral daily  QUEtiapine 12.5 milliGRAM(s) Oral at bedtime  rOPINIRole 2 milliGRAM(s) Oral at bedtime  senna 2 Tablet(s) Oral at bedtime    MEDICATIONS  (PRN):  acetaminophen     Tablet .. 650 milliGRAM(s) Oral every 6 hours PRN Temp greater or equal to 38C (100.4F), Mild Pain (1 - 3)  diphenhydrAMINE 25 milliGRAM(s) Oral every 4 hours PRN Rash and/or Itching  
MEDICATIONS  (STANDING):  atorvastatin 80 milliGRAM(s) Oral at bedtime  cholecalciferol 2000 Unit(s) Oral daily  enoxaparin Injectable 40 milliGRAM(s) SubCutaneous every 24 hours  FLUoxetine 40 milliGRAM(s) Oral daily  hydrochlorothiazide 25 milliGRAM(s) Oral daily  lisinopril 20 milliGRAM(s) Oral daily  methimazole 5 milliGRAM(s) Oral daily  polyethylene glycol 3350 17 Gram(s) Oral daily  QUEtiapine 12.5 milliGRAM(s) Oral at bedtime  rOPINIRole 2 milliGRAM(s) Oral at bedtime  senna 2 Tablet(s) Oral at bedtime    MEDICATIONS  (PRN):  acetaminophen     Tablet .. 650 milliGRAM(s) Oral every 6 hours PRN Temp greater or equal to 38C (100.4F), Mild Pain (1 - 3)  diphenhydrAMINE 25 milliGRAM(s) Oral every 4 hours PRN Rash and/or Itching  
MEDICATIONS  (STANDING):  atorvastatin 80 milliGRAM(s) Oral at bedtime  cholecalciferol 2000 Unit(s) Oral daily  enoxaparin Injectable 40 milliGRAM(s) SubCutaneous every 24 hours  FLUoxetine 40 milliGRAM(s) Oral daily  hydrochlorothiazide 25 milliGRAM(s) Oral daily  lisinopril 20 milliGRAM(s) Oral daily  methimazole 5 milliGRAM(s) Oral daily  polyethylene glycol 3350 17 Gram(s) Oral daily  QUEtiapine 12.5 milliGRAM(s) Oral at bedtime  rOPINIRole 2 milliGRAM(s) Oral at bedtime  senna 2 Tablet(s) Oral at bedtime    MEDICATIONS  (PRN):  acetaminophen     Tablet .. 650 milliGRAM(s) Oral every 6 hours PRN Temp greater or equal to 38C (100.4F), Mild Pain (1 - 3)  diphenhydrAMINE 25 milliGRAM(s) Oral every 4 hours PRN Rash and/or Itching  loperamide 2 milliGRAM(s) Oral daily PRN Diarrhea  
MEDICATIONS  (STANDING):  atorvastatin 80 milliGRAM(s) Oral at bedtime  calamine/zinc oxide Lotion 1 Application(s) Topical two times a day  cholecalciferol 2000 Unit(s) Oral daily  enoxaparin Injectable 40 milliGRAM(s) SubCutaneous every 24 hours  FLUoxetine 40 milliGRAM(s) Oral daily  hydrochlorothiazide 25 milliGRAM(s) Oral daily  lactated ringers. 1000 milliLiter(s) (75 mL/Hr) IV Continuous <Continuous>  lisinopril 20 milliGRAM(s) Oral daily  methimazole 5 milliGRAM(s) Oral daily  polyethylene glycol 3350 17 Gram(s) Oral daily  QUEtiapine 12.5 milliGRAM(s) Oral at bedtime  rOPINIRole 2 milliGRAM(s) Oral at bedtime  senna 2 Tablet(s) Oral at bedtime    MEDICATIONS  (PRN):  acetaminophen     Tablet .. 650 milliGRAM(s) Oral every 6 hours PRN Temp greater or equal to 38C (100.4F), Mild Pain (1 - 3)  diphenhydrAMINE 25 milliGRAM(s) Oral every 4 hours PRN Rash and/or Itching  loperamide 2 milliGRAM(s) Oral daily PRN Diarrhea  
MEDICATIONS  (STANDING):  atorvastatin 80 milliGRAM(s) Oral at bedtime  cholecalciferol 2000 Unit(s) Oral daily  enoxaparin Injectable 40 milliGRAM(s) SubCutaneous every 24 hours  FLUoxetine 40 milliGRAM(s) Oral daily  hydrochlorothiazide 25 milliGRAM(s) Oral daily  lisinopril 20 milliGRAM(s) Oral daily  methimazole 5 milliGRAM(s) Oral daily  polyethylene glycol 3350 17 Gram(s) Oral daily  QUEtiapine 12.5 milliGRAM(s) Oral at bedtime  rOPINIRole 2 milliGRAM(s) Oral at bedtime  senna 2 Tablet(s) Oral at bedtime    MEDICATIONS  (PRN):  acetaminophen     Tablet .. 650 milliGRAM(s) Oral every 6 hours PRN Temp greater or equal to 38C (100.4F), Mild Pain (1 - 3)  diphenhydrAMINE 25 milliGRAM(s) Oral every 4 hours PRN Rash and/or Itching  
MEDICATIONS  (STANDING):  atorvastatin 80 milliGRAM(s) Oral at bedtime  cholecalciferol 2000 Unit(s) Oral daily  enoxaparin Injectable 40 milliGRAM(s) SubCutaneous every 24 hours  FLUoxetine 40 milliGRAM(s) Oral daily  hydrochlorothiazide 25 milliGRAM(s) Oral daily  lisinopril 20 milliGRAM(s) Oral daily  methimazole 5 milliGRAM(s) Oral daily  polyethylene glycol 3350 17 Gram(s) Oral daily  QUEtiapine 12.5 milliGRAM(s) Oral at bedtime  rOPINIRole 2 milliGRAM(s) Oral at bedtime  senna 2 Tablet(s) Oral at bedtime    MEDICATIONS  (PRN):  acetaminophen     Tablet .. 650 milliGRAM(s) Oral every 6 hours PRN Temp greater or equal to 38C (100.4F), Mild Pain (1 - 3)  diphenhydrAMINE 25 milliGRAM(s) Oral every 4 hours PRN Rash and/or Itching  
MEDICATIONS  (STANDING):  atorvastatin 80 milliGRAM(s) Oral at bedtime  calamine/zinc oxide Lotion 1 Application(s) Topical two times a day  cholecalciferol 2000 Unit(s) Oral daily  enoxaparin Injectable 40 milliGRAM(s) SubCutaneous every 24 hours  FLUoxetine 40 milliGRAM(s) Oral daily  hydrochlorothiazide 25 milliGRAM(s) Oral daily  lactated ringers. 1000 milliLiter(s) (75 mL/Hr) IV Continuous <Continuous>  lisinopril 20 milliGRAM(s) Oral daily  methimazole 5 milliGRAM(s) Oral daily  polyethylene glycol 3350 17 Gram(s) Oral daily  QUEtiapine 12.5 milliGRAM(s) Oral at bedtime  rOPINIRole 2 milliGRAM(s) Oral at bedtime  senna 2 Tablet(s) Oral at bedtime    MEDICATIONS  (PRN):  acetaminophen     Tablet .. 650 milliGRAM(s) Oral every 6 hours PRN Temp greater or equal to 38C (100.4F), Mild Pain (1 - 3)  diphenhydrAMINE 25 milliGRAM(s) Oral every 4 hours PRN Rash and/or Itching  loperamide 2 milliGRAM(s) Oral daily PRN Diarrhea  
MEDICATIONS  (STANDING):  atorvastatin 80 milliGRAM(s) Oral at bedtime  cholecalciferol 2000 Unit(s) Oral daily  enoxaparin Injectable 40 milliGRAM(s) SubCutaneous every 24 hours  FLUoxetine 40 milliGRAM(s) Oral daily  hydrochlorothiazide 25 milliGRAM(s) Oral daily  lisinopril 20 milliGRAM(s) Oral daily  methimazole 5 milliGRAM(s) Oral daily  polyethylene glycol 3350 17 Gram(s) Oral daily  QUEtiapine 12.5 milliGRAM(s) Oral at bedtime  rOPINIRole 2 milliGRAM(s) Oral at bedtime  senna 2 Tablet(s) Oral at bedtime    MEDICATIONS  (PRN):  acetaminophen     Tablet .. 650 milliGRAM(s) Oral every 6 hours PRN Temp greater or equal to 38C (100.4F), Mild Pain (1 - 3)  diphenhydrAMINE 25 milliGRAM(s) Oral every 4 hours PRN Rash and/or Itching  
MEDICATIONS  (STANDING):  atorvastatin 80 milliGRAM(s) Oral at bedtime  cholecalciferol 2000 Unit(s) Oral daily  enoxaparin Injectable 40 milliGRAM(s) SubCutaneous every 24 hours  FLUoxetine 40 milliGRAM(s) Oral daily  hydrochlorothiazide 25 milliGRAM(s) Oral daily  lactated ringers. 1000 milliLiter(s) (100 mL/Hr) IV Continuous <Continuous>  lisinopril 20 milliGRAM(s) Oral daily  methimazole 5 milliGRAM(s) Oral daily  QUEtiapine 12.5 milliGRAM(s) Oral at bedtime  rOPINIRole 2 milliGRAM(s) Oral at bedtime    MEDICATIONS  (PRN):  acetaminophen     Tablet .. 650 milliGRAM(s) Oral every 6 hours PRN Temp greater or equal to 38C (100.4F), Mild Pain (1 - 3)  LORazepam     Tablet 0.5 milliGRAM(s) Oral every 6 hours PRN Anxiety  
MEDICATIONS  (STANDING):  atorvastatin 80 milliGRAM(s) Oral at bedtime  cholecalciferol 2000 Unit(s) Oral daily  enoxaparin Injectable 40 milliGRAM(s) SubCutaneous every 24 hours  FLUoxetine 40 milliGRAM(s) Oral daily  hydrochlorothiazide 25 milliGRAM(s) Oral daily  lisinopril 20 milliGRAM(s) Oral daily  methimazole 5 milliGRAM(s) Oral daily  QUEtiapine 12.5 milliGRAM(s) Oral at bedtime  rOPINIRole 2 milliGRAM(s) Oral at bedtime    MEDICATIONS  (PRN):  acetaminophen     Tablet .. 650 milliGRAM(s) Oral every 6 hours PRN Temp greater or equal to 38C (100.4F), Mild Pain (1 - 3)  diphenhydrAMINE 25 milliGRAM(s) Oral every 4 hours PRN Rash and/or Itching  LORazepam     Tablet 0.5 milliGRAM(s) Oral every 6 hours PRN Anxiety  
MEDICATIONS  (STANDING):  atorvastatin 80 milliGRAM(s) Oral at bedtime  cholecalciferol 2000 Unit(s) Oral daily  enoxaparin Injectable 40 milliGRAM(s) SubCutaneous every 24 hours  FLUoxetine 40 milliGRAM(s) Oral daily  hydrochlorothiazide 25 milliGRAM(s) Oral daily  lisinopril 20 milliGRAM(s) Oral daily  methimazole 5 milliGRAM(s) Oral daily  QUEtiapine 12.5 milliGRAM(s) Oral at bedtime  rOPINIRole 2 milliGRAM(s) Oral at bedtime    MEDICATIONS  (PRN):  acetaminophen     Tablet .. 650 milliGRAM(s) Oral every 6 hours PRN Temp greater or equal to 38C (100.4F), Mild Pain (1 - 3)  diphenhydrAMINE 25 milliGRAM(s) Oral every 4 hours PRN Rash and/or Itching  LORazepam     Tablet 0.5 milliGRAM(s) Oral every 6 hours PRN Anxiety  
MEDICATIONS  (STANDING):  atorvastatin 80 milliGRAM(s) Oral at bedtime  cholecalciferol 2000 Unit(s) Oral daily  enoxaparin Injectable 40 milliGRAM(s) SubCutaneous every 24 hours  FLUoxetine 40 milliGRAM(s) Oral daily  hydrochlorothiazide 25 milliGRAM(s) Oral daily  lisinopril 20 milliGRAM(s) Oral daily  methimazole 5 milliGRAM(s) Oral daily  polyethylene glycol 3350 17 Gram(s) Oral daily  QUEtiapine 12.5 milliGRAM(s) Oral at bedtime  rOPINIRole 2 milliGRAM(s) Oral at bedtime  senna 2 Tablet(s) Oral at bedtime    MEDICATIONS  (PRN):  acetaminophen     Tablet .. 650 milliGRAM(s) Oral every 6 hours PRN Temp greater or equal to 38C (100.4F), Mild Pain (1 - 3)  diphenhydrAMINE 25 milliGRAM(s) Oral every 4 hours PRN Rash and/or Itching  
MEDICATIONS  (STANDING):  atorvastatin 80 milliGRAM(s) Oral at bedtime  calamine/zinc oxide Lotion 1 Application(s) Topical two times a day  cholecalciferol 2000 Unit(s) Oral daily  enoxaparin Injectable 40 milliGRAM(s) SubCutaneous every 24 hours  FLUoxetine 40 milliGRAM(s) Oral daily  hydrochlorothiazide 25 milliGRAM(s) Oral daily  lactated ringers. 1000 milliLiter(s) (100 mL/Hr) IV Continuous <Continuous>  lactated ringers. 1000 milliLiter(s) (75 mL/Hr) IV Continuous <Continuous>  lisinopril 20 milliGRAM(s) Oral daily  methimazole 5 milliGRAM(s) Oral daily  polyethylene glycol 3350 17 Gram(s) Oral daily  QUEtiapine 12.5 milliGRAM(s) Oral at bedtime  rOPINIRole 2 milliGRAM(s) Oral at bedtime  senna 2 Tablet(s) Oral at bedtime    MEDICATIONS  (PRN):  acetaminophen     Tablet .. 650 milliGRAM(s) Oral every 6 hours PRN Temp greater or equal to 38C (100.4F), Mild Pain (1 - 3)  diphenhydrAMINE 25 milliGRAM(s) Oral every 4 hours PRN Rash and/or Itching  loperamide 2 milliGRAM(s) Oral daily PRN Diarrhea

## 2022-06-16 NOTE — PROGRESS NOTE ADULT - PROBLEM SELECTOR PLAN 2
- Holosystolic murmur on exam. TTE revealed mild to moderate aortic stenosis, EF normal 65%  - Recommend TTE every year to monitor

## 2022-06-16 NOTE — PROGRESS NOTE ADULT - ATTENDING COMMENTS
Pt is doing well, feels improved since ECT treatments were started; s/p ECT today, awaiting coordination of o/p ECT, to return to NH.  Continue seroquel and fluoxetine, discussed need for outpatient echo with  and patient at bedside

## 2022-06-17 PROBLEM — F01.50 VASCULAR DEMENTIA WITHOUT BEHAVIORAL DISTURBANCE: Chronic | Status: ACTIVE | Noted: 2022-05-26

## 2022-06-17 PROBLEM — R45.851 SUICIDAL IDEATIONS: Chronic | Status: ACTIVE | Noted: 2022-05-26

## 2022-06-17 PROBLEM — E11.9 TYPE 2 DIABETES MELLITUS WITHOUT COMPLICATIONS: Chronic | Status: ACTIVE | Noted: 2022-05-26

## 2022-06-17 PROBLEM — R26.9 UNSPECIFIED ABNORMALITIES OF GAIT AND MOBILITY: Chronic | Status: ACTIVE | Noted: 2022-05-26

## 2022-06-17 PROBLEM — E55.9 VITAMIN D DEFICIENCY, UNSPECIFIED: Chronic | Status: ACTIVE | Noted: 2022-05-26

## 2022-06-17 PROBLEM — Z87.09 PERSONAL HISTORY OF OTHER DISEASES OF THE RESPIRATORY SYSTEM: Chronic | Status: ACTIVE | Noted: 2022-05-26

## 2022-06-17 LAB
ANION GAP SERPL CALC-SCNC: 12 MMOL/L — SIGNIFICANT CHANGE UP (ref 7–14)
BUN SERPL-MCNC: 13 MG/DL — SIGNIFICANT CHANGE UP (ref 7–23)
CALCIUM SERPL-MCNC: 8.8 MG/DL — SIGNIFICANT CHANGE UP (ref 8.4–10.5)
CHLORIDE SERPL-SCNC: 101 MMOL/L — SIGNIFICANT CHANGE UP (ref 98–107)
CO2 SERPL-SCNC: 26 MMOL/L — SIGNIFICANT CHANGE UP (ref 22–31)
CREAT SERPL-MCNC: 0.53 MG/DL — SIGNIFICANT CHANGE UP (ref 0.5–1.3)
EGFR: 98 ML/MIN/1.73M2 — SIGNIFICANT CHANGE UP
GLUCOSE SERPL-MCNC: 78 MG/DL — SIGNIFICANT CHANGE UP (ref 70–99)
HCT VFR BLD CALC: 37.5 % — SIGNIFICANT CHANGE UP (ref 34.5–45)
HGB BLD-MCNC: 11.6 G/DL — SIGNIFICANT CHANGE UP (ref 11.5–15.5)
MCHC RBC-ENTMCNC: 30.2 PG — SIGNIFICANT CHANGE UP (ref 27–34)
MCHC RBC-ENTMCNC: 30.9 GM/DL — LOW (ref 32–36)
MCV RBC AUTO: 97.7 FL — SIGNIFICANT CHANGE UP (ref 80–100)
NRBC # BLD: 0 /100 WBCS — SIGNIFICANT CHANGE UP
NRBC # FLD: 0 K/UL — SIGNIFICANT CHANGE UP
PLATELET # BLD AUTO: 170 K/UL — SIGNIFICANT CHANGE UP (ref 150–400)
POTASSIUM SERPL-MCNC: 3.8 MMOL/L — SIGNIFICANT CHANGE UP (ref 3.5–5.3)
POTASSIUM SERPL-SCNC: 3.8 MMOL/L — SIGNIFICANT CHANGE UP (ref 3.5–5.3)
RBC # BLD: 3.84 M/UL — SIGNIFICANT CHANGE UP (ref 3.8–5.2)
RBC # FLD: 14.3 % — SIGNIFICANT CHANGE UP (ref 10.3–14.5)
SARS-COV-2 RNA SPEC QL NAA+PROBE: SIGNIFICANT CHANGE UP
SODIUM SERPL-SCNC: 139 MMOL/L — SIGNIFICANT CHANGE UP (ref 135–145)
WBC # BLD: 4.97 K/UL — SIGNIFICANT CHANGE UP (ref 3.8–10.5)
WBC # FLD AUTO: 4.97 K/UL — SIGNIFICANT CHANGE UP (ref 3.8–10.5)

## 2022-06-17 PROCEDURE — 99232 SBSQ HOSP IP/OBS MODERATE 35: CPT | Mod: GC

## 2022-06-17 RX ADMIN — ROPINIROLE 2 MILLIGRAM(S): 8 TABLET, FILM COATED, EXTENDED RELEASE ORAL at 21:30

## 2022-06-17 RX ADMIN — CALAMINE AND ZINC OXIDE AND PHENOL 1 APPLICATION(S): 160; 10 LOTION TOPICAL at 06:24

## 2022-06-17 RX ADMIN — ENOXAPARIN SODIUM 40 MILLIGRAM(S): 100 INJECTION SUBCUTANEOUS at 06:24

## 2022-06-17 RX ADMIN — CALAMINE AND ZINC OXIDE AND PHENOL 1 APPLICATION(S): 160; 10 LOTION TOPICAL at 17:41

## 2022-06-17 RX ADMIN — QUETIAPINE FUMARATE 12.5 MILLIGRAM(S): 200 TABLET, FILM COATED ORAL at 21:29

## 2022-06-17 RX ADMIN — Medication 2000 UNIT(S): at 11:32

## 2022-06-17 RX ADMIN — Medication 40 MILLIGRAM(S): at 11:32

## 2022-06-17 RX ADMIN — ATORVASTATIN CALCIUM 80 MILLIGRAM(S): 80 TABLET, FILM COATED ORAL at 21:30

## 2022-06-17 NOTE — PROGRESS NOTE ADULT - ASSESSMENT
72 yF PMHx vascular Dementia (A+Ox1-2), MDD (hx of SI), HTN, HLD, MS on Plegridy, COPD (not on home O2)  presents from [facility] for unresponsiveness for 10-15 seconds, EEG negative and daughter reports these have happened multiple times without new findings on MRI.

## 2022-06-17 NOTE — CHART NOTE - NSCHARTNOTESELECT_GEN_ALL_CORE
EEG Prelim
ECT Treatment Summary/Event Note
Event Note
Follow Up/Nutrition Services
Psychiatry/Event Note

## 2022-06-17 NOTE — CHART NOTE - NSCHARTNOTEFT_GEN_A_CORE
ECT Treatment Summary    Pt is a 73 y/o Obese F PMHx Dementia (A+Ox1-2), MDD (hx of SI), T2DM, HTN, HLD, MS, COPD (not on home O2) who presented from [facility] for unresponsiveness which rapidly resolved with negative neuro and cardiac/medical evaluations. Psych was consulted for ongoing follow-up. ECT was being considered at [facility] but never actually administered; discussed with pt and family and initiated at [facility] after obtaining medical/cardiac clearance. Pt underwent 6 treatments which were well-tolerated. Clinical response has been robust with dramatic reduction in depressed mood/anhedonia/constricted affect, and has stabilized from 3 treatments per week to 2 per week and now recommended to reduce to 1 treatment per week. Discussed with ECT team (Dr. Jaaj Brock) and agree that she is currently without need for further inpatient psych care, thus cleared for NH return. Plan is for her to continue once weekly maintenance ECT as transported from NH; family (April, daughter: [phone]) aware of this. Her outpatient psychiatrist (Dr. Leola Palma, [phone]) is also aware of this plan and supportive. Her medication regimen has not changed and will continue to consist of Prozac 20mg daily, Requip 2mg HS, Seroquel 12.5mg HS, and Ativan 0.5mg PO Q 6PRN anxiety, even after discharge. Will work on outpaitent ECT insurance authorization as well. Family made aware of specifics around outpatient ECT (need for consenting to maintenance ECT via ECT team, for pt to be accompanied by responsible adult with each treatment, need for her to be NPO night before treatments, and need for her to get COVID test within 72 hours of each treatment (family agrees to all).     Dennis Mckeon MD   Consultation Psychiatry, Director  [phone]
Notified by psychiatry that there may be a bed available today. [facility]. Sandra was notified of signout, and is pending a response regarding geriatric bed.
Patient received outpatient appointment at [facility] ECT for  Wednesday, June 22 @ 8AM, primary team resident, Aamir austin aware. Also discussed and recommend COVID test prior to discharge back to nursing home.
Pt seen for malnutrition follow up.    Medical Course:  - Per chart, pt is 72 year old female PMHx vascular dementia, MDD (hx of SI), HTN, HLD, MS, COPD (not on home O2) presenting fro [facility] for unresponsiveness x10-15 seconds. EEG negative and daughter reports these have happened multiple times without new findings on MRI. Pt pending transfer back to [facility] when bed available. Psych continues to follow, pt continues on ECT.     Nutrition Course:  - Pt reports good appetite/PO intake, consuming breakfast tray at time of visit. Pt vocalizes request for cookies.   - No noted GI distress, lat BM 6/7, fecal incontinence per flowsheets. Ordered for senna 2 tablets qHS and Miralax 17 gm qD.     Diet Prescription:   - NPO after Midnight: Start Date: 09-Jun-2022, Start Time: 23:59  - Regular     Pertinent Medications:   - atorvastatin, cholecalciferol, hydrochlorothiazide, lisinopril, polyethylene glycol, senna    Pertinent Labs:  - no new labs    Weight: (6/9 bed scale obtained at time of visit) 205.9 lbs / 93.6 kg, (5/25 dosing) 205 lbs / 92.9 kg  Height: 64 in / 162.56 cm  IBW: 120 lbs / 54.5 kg +/-10%  BMI: 35.3 kg/m^2 (at most recent weight)    Physical Assessment, per flowsheets:  Edema/Pressure Injury: none noted    Estimated Needs:   [X] No change since previous assessment, based on dosing weight 205 lbs / 92.9 kg  6829-4957 kcal daily @25-30 kcal/kg, 92.9-116.12 gm protein daily @1.0-1.25 gm/kg     Previous Nutrition Diagnosis: [X] Moderate malnutrition in the context of chronic illness, remains appropriate  New Nutrition Diagnosis: [X] not applicable     Interventions:   1) Recommend low sodium diet.  2) Encouraged PO intake as tolerated, obtained food preferences and will provide as able.  3) Obtain weekly weights.    Monitor & Evaluate:  PO intake, nutrition related lab values, weight trends, BMs/GI distress, hydration status, skin integrity.  Sandy Morrell, BETTY, CDN #[id number]
Initial 120 minutes of record reviewed.  There are no epileptiform abnormalities noted. Background is continuous.   Full report to follow after review of completed study tomorrow.     Smith Blanchard MD PhD  Director, Epilepsy Division, [facility] EEG Reading Room Ph#: [phone]  Epilepsy Answering Service after 5PM and before 8:30AM: Ph#: [phone]

## 2022-06-17 NOTE — PROGRESS NOTE ADULT - PROBLEM SELECTOR PLAN 6
DVT ppx: Lovenox SQ 40mg QD  GOC: DNR/DNI/DNH- MOLST in chart - comfort measures only  Dispo: Return to former LTAC ([facility]) pending outpatient ETC scheduling/authorization  Diet: Regular DVT ppx: Lovenox SQ 40mg QD  GOC: DNR/DNI/DNH- MOLST in chart - comfort measures only  Dispo: Return to former [facility] ([facility]) pending outpatient ETC scheduling/authorization  Diet: Regular

## 2022-06-17 NOTE — PROGRESS NOTE ADULT - PROBLEM SELECTOR PLAN 1
- Severe episode of recurrent major depressive disorder, without psychotic features was the reason for her admission to [facility]  - Psych recs appreciated   - Continue Prozac 40 mg daily, Quetiapine 12.5mg qhs  - Planned for ECT #6 on 6/14 and 6/16   - Per psych, given that patient is improving, patient no longer needs inpatient psych admission from psychiatry standpoint and can continue ECT treatments as outpatient. Her last ECT session will be 6/16  - Dispo planning: Awaiting ECT authorization and scheduling, LTAC placement - Severe episode of recurrent major depressive disorder, without psychotic features was the reason for her admission to [facility]  - Psych recs appreciated   - Continue Prozac 40 mg daily, Quetiapine 12.5mg qhs  - Planned for ECT #6 on 6/14 and 6/16   - Per psych, given that patient is improving, patient no longer needs inpatient psych admission from psychiatry standpoint and can continue ECT treatments as outpatient. Her last ECT session will be 6/16  - Dispo planning: Pending ECT appt for LTAC placement

## 2022-06-17 NOTE — PROGRESS NOTE ADULT - ATTENDING COMMENTS
Case d/w Dr Dominguez from Psychiatry.  The patient will continue to require o/p ECT, however is stable to be discharged to Rehab.  Continue seroquel and prozac, needs yearly surveillance for Mild AS.

## 2022-06-17 NOTE — PROGRESS NOTE ADULT - SUBJECTIVE AND OBJECTIVE BOX
PROGRESS NOTE:     Patient is a 72y old  Female who presents with a chief complaint of unresponsiveness (16 Jun 2022 07:05)      SUBJECTIVE / OVERNIGHT EVENTS:    ADDITIONAL REVIEW OF SYSTEMS: 10 point ROS negative except per HPI    MEDICATIONS  (STANDING):  atorvastatin 80 milliGRAM(s) Oral at bedtime  calamine/zinc oxide Lotion 1 Application(s) Topical two times a day  cholecalciferol 2000 Unit(s) Oral daily  enoxaparin Injectable 40 milliGRAM(s) SubCutaneous every 24 hours  FLUoxetine 40 milliGRAM(s) Oral daily  hydrochlorothiazide 25 milliGRAM(s) Oral daily  lactated ringers. 1000 milliLiter(s) (100 mL/Hr) IV Continuous <Continuous>  lactated ringers. 1000 milliLiter(s) (75 mL/Hr) IV Continuous <Continuous>  lisinopril 20 milliGRAM(s) Oral daily  methimazole 5 milliGRAM(s) Oral daily  polyethylene glycol 3350 17 Gram(s) Oral daily  QUEtiapine 12.5 milliGRAM(s) Oral at bedtime  rOPINIRole 2 milliGRAM(s) Oral at bedtime  senna 2 Tablet(s) Oral at bedtime    MEDICATIONS  (PRN):  acetaminophen     Tablet .. 650 milliGRAM(s) Oral every 6 hours PRN Temp greater or equal to 38C (100.4F), Mild Pain (1 - 3)  diphenhydrAMINE 25 milliGRAM(s) Oral every 4 hours PRN Rash and/or Itching  loperamide 2 milliGRAM(s) Oral daily PRN Diarrhea      CAPILLARY BLOOD GLUCOSE        I&O's Summary      PHYSICAL EXAM:  Vital Signs Last 24 Hrs  T(C): 36.3 (17 Jun 2022 05:22), Max: 36.9 (16 Jun 2022 21:37)  T(F): 97.4 (17 Jun 2022 05:22), Max: 98.5 (16 Jun 2022 21:37)  HR: 62 (17 Jun 2022 05:22) (60 - 66)  BP: 117/61 (17 Jun 2022 05:22) (105/63 - 117/68)  BP(mean): --  RR: 17 (17 Jun 2022 05:22) (17 - 17)  SpO2: 97% (17 Jun 2022 05:22) (96% - 99%)    CONSTITUTIONAL: NAD, well-developed, A&Ox3 to person, place, time.  RESPIRATORY: Normal respiratory effort; lungs are clear to auscultation bilaterally  CARDIOVASCULAR: Regular rate and rhythm, normal S1 and S2, no murmur/rub/gallop; No lower extremity edema; Peripheral pulses are 2+ bilaterally  ABDOMEN: Nontender to palpation, no rebound/guarding; No hepatosplenomegaly  MUSCLOSKELETAL: no clubbing or cyanosis of digits; no joint swelling or tenderness to palpation  NEURO: CN 2-12 grossly intact, moves all limbs spontaneously      LABS:                  -----    MICRO      -----    TRENDS    Creatinine Trend: 0.67<--, 0.94<--, 0.89<--, 0.77<--, 0.71<--, 0.74<--  ----  RADIOLOGY & ADDITIONAL TESTS:  Results Reviewed:   Imaging Personally Reviewed:  Electrocardiogram Personally Reviewed:    COORDINATION OF CARE:  Care Discussed with Consultants/Other Providers [Y/N]:  Prior or Outpatient Records Reviewed [Y/N]:   PROGRESS NOTE:     Patient is a 72y old  Female who presents with a chief complaint of unresponsiveness (16 Jun 2022 07:05)      SUBJECTIVE / OVERNIGHT EVENTS: No acute events overnight. Denies fever, chills, chest pain, nausea vomiting abdominal pain. Eating breakfast. She states that her mood has improved dramatically that she now enjoys browsing through the [employer] and is looking forward to shopping.     ADDITIONAL REVIEW OF SYSTEMS: 10 point ROS negative except per HPI    MEDICATIONS  (STANDING):  atorvastatin 80 milliGRAM(s) Oral at bedtime  calamine/zinc oxide Lotion 1 Application(s) Topical two times a day  cholecalciferol 2000 Unit(s) Oral daily  enoxaparin Injectable 40 milliGRAM(s) SubCutaneous every 24 hours  FLUoxetine 40 milliGRAM(s) Oral daily  hydrochlorothiazide 25 milliGRAM(s) Oral daily  lactated ringers. 1000 milliLiter(s) (100 mL/Hr) IV Continuous <Continuous>  lactated ringers. 1000 milliLiter(s) (75 mL/Hr) IV Continuous <Continuous>  lisinopril 20 milliGRAM(s) Oral daily  methimazole 5 milliGRAM(s) Oral daily  polyethylene glycol 3350 17 Gram(s) Oral daily  QUEtiapine 12.5 milliGRAM(s) Oral at bedtime  rOPINIRole 2 milliGRAM(s) Oral at bedtime  senna 2 Tablet(s) Oral at bedtime    MEDICATIONS  (PRN):  acetaminophen     Tablet .. 650 milliGRAM(s) Oral every 6 hours PRN Temp greater or equal to 38C (100.4F), Mild Pain (1 - 3)  diphenhydrAMINE 25 milliGRAM(s) Oral every 4 hours PRN Rash and/or Itching  loperamide 2 milliGRAM(s) Oral daily PRN Diarrhea      CAPILLARY BLOOD GLUCOSE        I&O's Summary      PHYSICAL EXAM:  Vital Signs Last 24 Hrs  T(C): 36.3 (17 Jun 2022 05:22), Max: 36.9 (16 Jun 2022 21:37)  T(F): 97.4 (17 Jun 2022 05:22), Max: 98.5 (16 Jun 2022 21:37)  HR: 62 (17 Jun 2022 05:22) (60 - 66)  BP: 117/61 (17 Jun 2022 05:22) (105/63 - 117/68)  RR: 17 (17 Jun 2022 05:22) (17 - 17)  SpO2: 97% (17 Jun 2022 05:22) (96% - 99%)    CONSTITUTIONAL: NAD, well-developed, A&Ox3 to person, place, time.  HEENT: NCAT, sclera anicteric, moist mucous membranes  RESPIRATORY: Normal respiratory effort; lungs are clear to auscultation bilaterally  CARDIOVASCULAR: Regular rate and rhythm, holosystolic murmur at the left sternal border, normal S1 and S2, no murmur/rub/gallop  ABDOMEN: Nontender to palpation, no rebound/guarding   MUSCLOSKELETAL: no clubbing or cyanosis of digits; no edema  NEURO: CN 2-12 grossly intact, moves all limbs spontaneously      LABS:                  -----    MICRO      -----    TRENDS    Creatinine Trend: 0.67<--, 0.94<--, 0.89<--, 0.77<--, 0.71<--, 0.74<--  ----  RADIOLOGY & ADDITIONAL TESTS:  Results Reviewed:   Imaging Personally Reviewed:  Electrocardiogram Personally Reviewed:    COORDINATION OF CARE:  Care Discussed with Consultants/Other Providers [Y/N]:  Prior or Outpatient Records Reviewed [Y/N]:

## 2022-06-18 ENCOUNTER — TRANSCRIPTION ENCOUNTER (OUTPATIENT)
Age: 72
End: 2022-06-18

## 2022-06-18 VITALS
RESPIRATION RATE: 17 BRPM | TEMPERATURE: 98 F | SYSTOLIC BLOOD PRESSURE: 139 MMHG | HEART RATE: 64 BPM | OXYGEN SATURATION: 95 % | DIASTOLIC BLOOD PRESSURE: 73 MMHG

## 2022-06-18 PROCEDURE — 99239 HOSP IP/OBS DSCHRG MGMT >30: CPT

## 2022-06-18 RX ADMIN — LISINOPRIL 20 MILLIGRAM(S): 2.5 TABLET ORAL at 05:30

## 2022-06-18 RX ADMIN — Medication 2000 UNIT(S): at 12:24

## 2022-06-18 RX ADMIN — ENOXAPARIN SODIUM 40 MILLIGRAM(S): 100 INJECTION SUBCUTANEOUS at 05:53

## 2022-06-18 RX ADMIN — CALAMINE AND ZINC OXIDE AND PHENOL 1 APPLICATION(S): 160; 10 LOTION TOPICAL at 05:31

## 2022-06-18 RX ADMIN — Medication 40 MILLIGRAM(S): at 12:24

## 2022-06-18 RX ADMIN — POLYETHYLENE GLYCOL 3350 17 GRAM(S): 17 POWDER, FOR SOLUTION ORAL at 12:24

## 2022-06-18 RX ADMIN — Medication 25 MILLIGRAM(S): at 05:30

## 2022-06-18 NOTE — PROGRESS NOTE ADULT - ASSESSMENT
72 yF PMHx vascular Dementia (A+Ox1-2), MDD (hx of SI), HTN, HLD, MS on Plegridy, COPD (not on home O2)  presents from [facility] for unresponsiveness for 10-15 seconds, EEG negative and daughter reports these have happened multiple times without new findings on MRI.

## 2022-06-18 NOTE — PROGRESS NOTE ADULT - REASON FOR ADMISSION
unresponsiveness

## 2022-06-18 NOTE — DISCHARGE NOTE NURSING/CASE MANAGEMENT/SOCIAL WORK - NSDCFUADDAPPT_GEN_ALL_CORE_FT
Your next ECT appt is scheduled for Wednesday, June 22 @ 8AM. As discussed, you will need to be NPO the night before and obtain a COVID test within 72 hours of each treatment. You will need to be accompanied by responsible adult with each treatment. Please note the location and contact information as below.     [phone] [place] [address], ECT [address], [facility] [place] [address] [phone]     You received Plegridy every 2 weeks on 5/30, 6/13. Your next dose is due on 06/27

## 2022-06-18 NOTE — PROGRESS NOTE ADULT - PROBLEM SELECTOR PLAN 1
- Severe episode of recurrent major depressive disorder, without psychotic features was the reason for her admission to [facility]  - Psych recs appreciated   - Continue Prozac 40 mg daily, Quetiapine 12.5mg qhs  - Planned for ECT #6 on 6/14 and 6/16   - Per psych, given that patient is improving, patient no longer needs inpatient psych admission from psychiatry standpoint and can continue ECT treatments as outpatient. Her last ECT session will be 6/16  - Dispo planning: Pending ECT appt for LTAC placement

## 2022-06-18 NOTE — PROGRESS NOTE ADULT - PROBLEM SELECTOR PLAN 6
DVT ppx: Lovenox SQ 40mg QD  GOC: DNR/DNI/DNH- MOLST in chart - comfort measures only  Dispo: Return to former [facility] pending outpatient ETC scheduling/authorization  Diet: Regular

## 2022-06-18 NOTE — PROGRESS NOTE ADULT - ATTENDING COMMENTS
72 yF PMHx vascular Dementia (A+Ox1-2), MDD (hx of SI), HTN, HLD, MS on Plegridy, COPD (not on home O2)  presents from [facility] for unresponsiveness for 10-15 seconds, EEG negative and daughter reports these have happened multiple times without new findings on MRI.    Patient is stable to [facility] rehab, I spent 38 min coordinating DC.

## 2022-06-18 NOTE — DISCHARGE NOTE NURSING/CASE MANAGEMENT/SOCIAL WORK - PATIENT PORTAL LINK FT
You can access the FollowMyHealth Patient Portal offered by [facility] by registering at the following website: [email or web address]. By joining [employer]’s FollowMyHealth portal, you will also be able to view your health information using other applications (apps) compatible with our system.

## 2022-06-18 NOTE — PROGRESS NOTE ADULT - NUTRITIONAL ASSESSMENT
This patient has been assessed with a concern for Malnutrition and has been determined to have a diagnosis/diagnoses of Moderate protein-calorie malnutrition.    This patient is being managed with:   Diet NPO after Midnight-     NPO Start Date: 15-Eber-2022   NPO Start Time: 23:59  Except Medications  Entered: Eber 15 2022 10:37PM    Diet Regular-  Entered: May 27 2022  3:31PM    
This patient has been assessed with a concern for Malnutrition and has been determined to have a diagnosis/diagnoses of Moderate protein-calorie malnutrition.    This patient is being managed with:   Diet Regular-  Entered: May 27 2022  3:31PM    
This patient has been assessed with a concern for Malnutrition and has been determined to have a diagnosis/diagnoses of Moderate protein-calorie malnutrition.    This patient is being managed with:   Diet NPO after Midnight-     NPO Start Date: 07-Jun-2022   NPO Start Time: 23:59  Except Medications  Entered: Jun 7 2022 11:08PM    Diet Regular-  Entered: May 27 2022  3:31PM    
This patient has been assessed with a concern for Malnutrition and has been determined to have a diagnosis/diagnoses of Moderate protein-calorie malnutrition.    This patient is being managed with:   Diet Regular-  Entered: May 27 2022  3:31PM    
This patient has been assessed with a concern for Malnutrition and has been determined to have a diagnosis/diagnoses of Moderate protein-calorie malnutrition.    This patient is being managed with:   Diet NPO after Midnight-     NPO Start Date: 09-Jun-2022   NPO Start Time: 23:59  Except Medications  Entered: Jun 9 2022  7:31PM    Diet Regular-  Entered: May 27 2022  3:31PM    
This patient has been assessed with a concern for Malnutrition and has been determined to have a diagnosis/diagnoses of Moderate protein-calorie malnutrition.    This patient is being managed with:   Diet NPO-  NPO for Procedure/Test     NPO Start Date: 05-Jun-2022   NPO Start Time: 23:59  Entered: Jun 4 2022  1:02PM    Diet Regular-  Entered: May 27 2022  3:31PM    
This patient has been assessed with a concern for Malnutrition and has been determined to have a diagnosis/diagnoses of Moderate protein-calorie malnutrition.    This patient is being managed with:   Diet Regular-  Entered: May 27 2022  3:31PM    
This patient has been assessed with a concern for Malnutrition and has been determined to have a diagnosis/diagnoses of Moderate protein-calorie malnutrition.    This patient is being managed with:   Diet NPO-  NPO for Procedure/Test     NPO Start Date: 05-Jun-2022   NPO Start Time: 23:59  Entered: Jun 4 2022  1:02PM    Diet Regular-  Entered: May 27 2022  3:31PM    
This patient has been assessed with a concern for Malnutrition and has been determined to have a diagnosis/diagnoses of Moderate protein-calorie malnutrition.    This patient is being managed with:   Diet NPO after Midnight-     NPO Start Date: 04-Jun-2022   NPO Start Time: 23:59  Entered: Jun 4 2022  6:40AM    Diet Regular-  Entered: May 27 2022  3:31PM    
This patient has been assessed with a concern for Malnutrition and has been determined to have a diagnosis/diagnoses of Moderate protein-calorie malnutrition.    This patient is being managed with:   Diet NPO after Midnight-     NPO Start Date: 13-Jun-2022   NPO Start Time: 23:59  Except Medications  Entered: Jun 13 2022  6:41AM    Diet Regular-  Entered: May 27 2022  3:31PM    
This patient has been assessed with a concern for Malnutrition and has been determined to have a diagnosis/diagnoses of Moderate protein-calorie malnutrition.    This patient is being managed with:   Diet Regular-  Entered: May 27 2022  3:31PM    
This patient has been assessed with a concern for Malnutrition and has been determined to have a diagnosis/diagnoses of Moderate protein-calorie malnutrition.    This patient is being managed with:   Diet Regular-  Entered: May 27 2022  3:31PM    
This patient has been assessed with a concern for Malnutrition and has been determined to have a diagnosis/diagnoses of Moderate protein-calorie malnutrition.    This patient is being managed with:   Diet NPO after Midnight-     NPO Start Date: 08-Jun-2022   NPO Start Time: 23:59  Except Medications  Entered: Jun 8 2022  7:34PM    Diet Regular-  Entered: May 27 2022  3:31PM    
This patient has been assessed with a concern for Malnutrition and has been determined to have a diagnosis/diagnoses of Moderate protein-calorie malnutrition.    This patient is being managed with:   Diet Regular-  Entered: May 27 2022  3:31PM

## 2022-06-18 NOTE — DISCHARGE NOTE NURSING/CASE MANAGEMENT/SOCIAL WORK - NSDCPEFALRISK_GEN_ALL_CORE
For information on Fall & Injury Prevention, visit: [email or web address] OR  [email or web address] OR  https://www.cdc.gov/steadi/patient.html

## 2022-06-18 NOTE — PROGRESS NOTE ADULT - PROBLEM/PLAN-2
DISPLAY PLAN FREE TEXT
home

## 2022-06-18 NOTE — PROGRESS NOTE ADULT - PROVIDER SPECIALTY LIST ADULT
Internal Medicine
23

## 2022-06-18 NOTE — PROGRESS NOTE ADULT - SUBJECTIVE AND OBJECTIVE BOX
PROGRESS NOTE:   Authored by Dr. Mame Posey, SHAMIR pager [phone]    Patient is a 72y old  Female who presents with a chief complaint of unresponsiveness (17 Jun 2022 07:10)      SUBJECTIVE / OVERNIGHT EVENTS:    MEDICATIONS  (STANDING):  atorvastatin 80 milliGRAM(s) Oral at bedtime  calamine/zinc oxide Lotion 1 Application(s) Topical two times a day  cholecalciferol 2000 Unit(s) Oral daily  enoxaparin Injectable 40 milliGRAM(s) SubCutaneous every 24 hours  FLUoxetine 40 milliGRAM(s) Oral daily  hydrochlorothiazide 25 milliGRAM(s) Oral daily  lactated ringers. 1000 milliLiter(s) (75 mL/Hr) IV Continuous <Continuous>  lactated ringers. 1000 milliLiter(s) (100 mL/Hr) IV Continuous <Continuous>  lisinopril 20 milliGRAM(s) Oral daily  methimazole 5 milliGRAM(s) Oral daily  polyethylene glycol 3350 17 Gram(s) Oral daily  QUEtiapine 12.5 milliGRAM(s) Oral at bedtime  rOPINIRole 2 milliGRAM(s) Oral at bedtime  senna 2 Tablet(s) Oral at bedtime    MEDICATIONS  (PRN):  acetaminophen     Tablet .. 650 milliGRAM(s) Oral every 6 hours PRN Temp greater or equal to 38C (100.4F), Mild Pain (1 - 3)  diphenhydrAMINE 25 milliGRAM(s) Oral every 4 hours PRN Rash and/or Itching  loperamide 2 milliGRAM(s) Oral daily PRN Diarrhea      CAPILLARY BLOOD GLUCOSE        I&O's Summary      PHYSICAL EXAM:  Vital Signs Last 24 Hrs  T(C): 36.6 (18 Jun 2022 05:17), Max: 36.9 (17 Jun 2022 21:17)  T(F): 97.8 (18 Jun 2022 05:17), Max: 98.5 (17 Jun 2022 21:17)  HR: 64 (18 Jun 2022 05:17) (64 - 77)  BP: 139/73 (18 Jun 2022 05:17) (118/63 - 139/73)  BP(mean): --  RR: 17 (18 Jun 2022 05:17) (17 - 18)  SpO2: 95% (18 Jun 2022 05:17) (95% - 98%)    GENERAL: No acute distress, well-developed  HEAD:  Atraumatic, Normocephalic  EYES: EOMI, PERRLA, conjunctiva and sclera clear  NECK: Supple, no lymphadenopathy, no JVD  CHEST/LUNG: CTAB; No wheezes, rales, or rhonchi  HEART: Regular rate and rhythm; No murmurs, rubs, or gallops  ABDOMEN: Soft, non-tender, non-distended; normal bowel sounds, no organomegaly  EXTREMITIES:  2+ peripheral pulses b/l, No clubbing, cyanosis, or edema  NEUROLOGY: A&O x 3, no focal deficits  SKIN: No rashes or lesions    LABS:                        11.6   4.97  )-----------( 170      ( 17 Jun 2022 06:15 )             37.5     06-17    139  |  101  |  13  ----------------------------<  78  3.8   |  26  |  0.53    Ca    8.8      17 Jun 2022 06:15                  RADIOLOGY & ADDITIONAL TESTS:  Results Reviewed:   Imaging Personally Reviewed:  Electrocardiogram Personally Reviewed:    COORDINATION OF CARE:  Care Discussed with Consultants/Other Providers [Y/N]:  Prior or Outpatient Records Reviewed [Y/N]:   PROGRESS NOTE:   Authored by Dr. Mame Posey, SHAMIR pager [phone]    Patient is a 72y old  Female who presents with a chief complaint of unresponsiveness (17 Jun 2022 07:10)      SUBJECTIVE / OVERNIGHT EVENTS: seen and examined at bedside. no acute issues     MEDICATIONS  (STANDING):  atorvastatin 80 milliGRAM(s) Oral at bedtime  calamine/zinc oxide Lotion 1 Application(s) Topical two times a day  cholecalciferol 2000 Unit(s) Oral daily  enoxaparin Injectable 40 milliGRAM(s) SubCutaneous every 24 hours  FLUoxetine 40 milliGRAM(s) Oral daily  hydrochlorothiazide 25 milliGRAM(s) Oral daily  lactated ringers. 1000 milliLiter(s) (75 mL/Hr) IV Continuous <Continuous>  lactated ringers. 1000 milliLiter(s) (100 mL/Hr) IV Continuous <Continuous>  lisinopril 20 milliGRAM(s) Oral daily  methimazole 5 milliGRAM(s) Oral daily  polyethylene glycol 3350 17 Gram(s) Oral daily  QUEtiapine 12.5 milliGRAM(s) Oral at bedtime  rOPINIRole 2 milliGRAM(s) Oral at bedtime  senna 2 Tablet(s) Oral at bedtime    MEDICATIONS  (PRN):  acetaminophen     Tablet .. 650 milliGRAM(s) Oral every 6 hours PRN Temp greater or equal to 38C (100.4F), Mild Pain (1 - 3)  diphenhydrAMINE 25 milliGRAM(s) Oral every 4 hours PRN Rash and/or Itching  loperamide 2 milliGRAM(s) Oral daily PRN Diarrhea      CAPILLARY BLOOD GLUCOSE        I&O's Summary      PHYSICAL EXAM:  Vital Signs Last 24 Hrs  T(C): 36.6 (18 Jun 2022 05:17), Max: 36.9 (17 Jun 2022 21:17)  T(F): 97.8 (18 Jun 2022 05:17), Max: 98.5 (17 Jun 2022 21:17)  HR: 64 (18 Jun 2022 05:17) (64 - 77)  BP: 139/73 (18 Jun 2022 05:17) (118/63 - 139/73)  BP(mean): --  RR: 17 (18 Jun 2022 05:17) (17 - 18)  SpO2: 95% (18 Jun 2022 05:17) (95% - 98%)    GENERAL: No acute distress, well-developed  HEAD:  Atraumatic, Normocephalic  EYES: EOMI, PERRLA, conjunctiva and sclera clear  NECK: Supple, no lymphadenopathy, no JVD  CHEST/LUNG: CTAB; No wheezes, rales, or rhonchi  HEART: Regular rate and rhythm; No murmurs, rubs, or gallops  ABDOMEN: Soft, non-tender, non-distended; normal bowel sounds, no organomegaly  EXTREMITIES:  2+ peripheral pulses b/l, No clubbing, cyanosis, or edema  NEUROLOGY: A&O x 3, no focal deficits  SKIN: No rashes or lesions    LABS:                        11.6   4.97  )-----------( 170      ( 17 Jun 2022 06:15 )             37.5     06-17    139  |  101  |  13  ----------------------------<  78  3.8   |  26  |  0.53    Ca    8.8      17 Jun 2022 06:15                  RADIOLOGY & ADDITIONAL TESTS:  Results Reviewed:   Imaging Personally Reviewed:  Electrocardiogram Personally Reviewed:    COORDINATION OF CARE:  Care Discussed with Consultants/Other Providers [Y/N]:  Prior or Outpatient Records Reviewed [Y/N]:

## 2022-06-22 ENCOUNTER — OUTPATIENT (OUTPATIENT)
Dept: OUTPATIENT SERVICES | Facility: HOSPITAL | Age: 72
LOS: 1 days | Discharge: HOME | End: 2022-06-22
Payer: MEDICARE

## 2022-06-22 VITALS
SYSTOLIC BLOOD PRESSURE: 121 MMHG | TEMPERATURE: 98 F | HEART RATE: 65 BPM | RESPIRATION RATE: 18 BRPM | OXYGEN SATURATION: 96 % | DIASTOLIC BLOOD PRESSURE: 66 MMHG

## 2022-06-22 DIAGNOSIS — G70.00 MYASTHENIA GRAVIS WITHOUT (ACUTE) EXACERBATION: ICD-10-CM

## 2022-06-22 DIAGNOSIS — E11.9 TYPE 2 DIABETES MELLITUS WITHOUT COMPLICATIONS: ICD-10-CM

## 2022-06-22 DIAGNOSIS — Z78.9 OTHER SPECIFIED HEALTH STATUS: Chronic | ICD-10-CM

## 2022-06-22 DIAGNOSIS — J44.9 CHRONIC OBSTRUCTIVE PULMONARY DISEASE, UNSPECIFIED: ICD-10-CM

## 2022-06-22 LAB — GLUCOSE BLDC GLUCOMTR-MCNC: 94 MG/DL — SIGNIFICANT CHANGE UP (ref 70–99)

## 2022-06-22 PROCEDURE — 90870 ELECTROCONVULSIVE THERAPY: CPT

## 2022-06-22 NOTE — ECT AMBULATORY DISCHARGE PLAN - NSDCPEFALRISK_GEN_ALL_CORE
For information on Fall & Injury Prevention, visit: https://www.[facility]/news/fall-prevention-protects-and-maintains-health-and-mobility OR  https://www.[facility]/news/fall-prevention-tips-to-avoid-injury OR  https://www.cdc.gov/steadi/patient.html

## 2022-06-22 NOTE — ECT AMBULATORY DISCHARGE PLAN - NSPOSTECTCALLBEFORE_PSY_ALL_CORE
[facility] ([facility]) scheduling office at [phone]

## 2022-06-22 NOTE — ECT AMBULATORY DISCHARGE PLAN - PATIENT PORTAL LINK FT
You can access the FollowMyHealth Patient Portal offered by [facility] by registering at the following website: [email or web address]. By joining [employer]’s FollowMyHealth portal, you will also be able to view your health information using other applications (apps) compatible with our system.

## 2022-06-23 DIAGNOSIS — G70.00 MYASTHENIA GRAVIS WITHOUT (ACUTE) EXACERBATION: ICD-10-CM

## 2022-06-23 DIAGNOSIS — F32.9 MAJOR DEPRESSIVE DISORDER, SINGLE EPISODE, UNSPECIFIED: ICD-10-CM

## 2022-06-23 DIAGNOSIS — E11.9 TYPE 2 DIABETES MELLITUS WITHOUT COMPLICATIONS: ICD-10-CM

## 2022-06-23 DIAGNOSIS — J44.9 CHRONIC OBSTRUCTIVE PULMONARY DISEASE, UNSPECIFIED: ICD-10-CM

## 2022-06-28 LAB — GLUCOSE BLDC GLUCOMTR-MCNC: 83 MG/DL — SIGNIFICANT CHANGE UP (ref 70–99)

## 2022-06-28 PROCEDURE — 90870 ELECTROCONVULSIVE THERAPY: CPT

## 2022-06-28 NOTE — ECT AMBULATORY DISCHARGE PLAN - NSPOSTECTCALLBEFORE_PSY_ALL_CORE
[facility] ([facility]) scheduling office at [phone]

## 2022-06-28 NOTE — ECT AMBULATORY DISCHARGE PLAN - PATIENT PORTAL LINK FT
You can access the FollowMyHealth Patient Portal offered by [facility] by registering at the following website: [email or web address]. By joining [employer]’s FollowMyHealth portal, you will also be able to view your health information using other applications (apps) compatible with our system.

## 2022-06-28 NOTE — ECT AMBULATORY DISCHARGE PLAN - NSDCPEFALRISK_GEN_ALL_CORE
For information on Fall & Injury Prevention, visit: https://www.[facility]/news/fall-prevention-protects-and-maintains-health-and-mobility OR  https://www.[facility]/news/fall-prevention-tips-to-avoid-injury OR  https://www.cdc.gov/steadi/patient.html

## 2022-07-06 LAB — GLUCOSE BLDC GLUCOMTR-MCNC: 85 MG/DL — SIGNIFICANT CHANGE UP (ref 70–99)

## 2022-07-06 PROCEDURE — 90870 ELECTROCONVULSIVE THERAPY: CPT

## 2022-07-06 NOTE — ECT AMBULATORY DISCHARGE PLAN - PATIENT PORTAL LINK FT
You can access the FollowMyHealth Patient Portal offered by [facility] by registering at the following website: [email or web address]. By joining [employer]’s FollowMyHealth portal, you will also be able to view your health information using other applications (apps) compatible with our system.

## 2022-07-21 LAB — GLUCOSE BLDC GLUCOMTR-MCNC: 83 MG/DL — SIGNIFICANT CHANGE UP (ref 70–99)

## 2022-07-21 PROCEDURE — 90870 ELECTROCONVULSIVE THERAPY: CPT

## 2022-07-21 NOTE — ECT AMBULATORY DISCHARGE PLAN - PATIENT PORTAL LINK FT
You can access the FollowMyHealth Patient Portal offered by [facility] by registering at the following website: [email or web address]. By joining [employer]’s FollowMyHealth portal, you will also be able to view your health information using other applications (apps) compatible with our system.

## 2022-07-21 NOTE — ECT AMBULATORY DISCHARGE PLAN - NSDCPEFALRISK_GEN_ALL_CORE
For information on Fall & Injury Prevention, visit: https://www.[facility]/news/fall-prevention-protects-and-maintains-health-and-mobility OR  https://www.[facility]/news/fall-prevention-tips-to-avoid-injury OR  https://www.cdc.gov/steadi/patient.html

## 2022-07-21 NOTE — ECT AMBULATORY DISCHARGE PLAN - NSPOSTECTCALLBEFORE_PSY_ALL_CORE
[facility] ([facility]) scheduling office at [phone]

## 2022-08-09 LAB — SARS-COV-2 RNA SPEC QL NAA+PROBE: SIGNIFICANT CHANGE UP

## 2022-08-11 LAB — GLUCOSE BLDC GLUCOMTR-MCNC: 92 MG/DL — SIGNIFICANT CHANGE UP (ref 70–99)

## 2022-08-11 PROCEDURE — 90870 ELECTROCONVULSIVE THERAPY: CPT

## 2022-08-11 NOTE — ECT AMBULATORY DISCHARGE PLAN - NSDCPEFALRISK_GEN_ALL_CORE
For information on Fall & Injury Prevention, visit: https://www.[facility]/news/fall-prevention-protects-and-maintains-health-and-mobility OR  https://www.[facility]/news/fall-prevention-tips-to-avoid-injury OR  https://www.cdc.gov/steadi/patient.html

## 2022-08-11 NOTE — ECT AMBULATORY DISCHARGE PLAN - PATIENT PORTAL LINK FT
You can access the FollowMyHealth Patient Portal offered by [facility] by registering at the following website: [email or web address]. By joining [employer]’s FollowMyHealth portal, you will also be able to view your health information using other applications (apps) compatible with our system.

## 2022-08-25 LAB — GLUCOSE BLDC GLUCOMTR-MCNC: 91 MG/DL — SIGNIFICANT CHANGE UP (ref 70–99)

## 2022-08-25 PROCEDURE — 90870 ELECTROCONVULSIVE THERAPY: CPT

## 2022-08-25 NOTE — ECT AMBULATORY DISCHARGE PLAN - NSDCPEFALRISK_GEN_ALL_CORE
For information on Fall & Injury Prevention, visit: https://www.[facility]/news/fall-prevention-protects-and-maintains-health-and-mobility OR  https://www.[facility]/news/fall-prevention-tips-to-avoid-injury OR  https://www.cdc.gov/steadi/patient.html

## 2022-08-25 NOTE — ECT AMBULATORY DISCHARGE PLAN - PATIENT PORTAL LINK FT
You can access the FollowMyHealth Patient Portal offered by [facility] by registering at the following website: [email or web address]. By joining [employer]’s FollowMyHealth portal, you will also be able to view your health information using other applications (apps) compatible with our system.

## 2022-09-13 LAB — SARS-COV-2 RNA SPEC QL NAA+PROBE: SIGNIFICANT CHANGE UP

## 2022-09-15 LAB — GLUCOSE BLDC GLUCOMTR-MCNC: 86 MG/DL — SIGNIFICANT CHANGE UP (ref 70–99)

## 2022-09-15 PROCEDURE — 90870 ELECTROCONVULSIVE THERAPY: CPT

## 2022-09-15 NOTE — ECT AMBULATORY DISCHARGE PLAN - NSDCPEFALRISK_GEN_ALL_CORE
For information on Fall & Injury Prevention, visit: https://www.[facility]/news/fall-prevention-protects-and-maintains-health-and-mobility OR  https://www.[facility]/news/fall-prevention-tips-to-avoid-injury OR  https://www.cdc.gov/steadi/patient.html

## 2022-09-15 NOTE — ECT AMBULATORY DISCHARGE PLAN - PATIENT PORTAL LINK FT
You can access the FollowMyHealth Patient Portal offered by [facility] by registering at the following website: [email or web address]. By joining [employer]’s FollowMyHealth portal, you will also be able to view your health information using other applications (apps) compatible with our system.

## 2022-10-13 LAB — GLUCOSE BLDC GLUCOMTR-MCNC: 90 MG/DL — SIGNIFICANT CHANGE UP (ref 70–99)

## 2022-10-13 PROCEDURE — 90870 ELECTROCONVULSIVE THERAPY: CPT

## 2022-10-13 NOTE — ECT AMBULATORY DISCHARGE PLAN - NSPOSTECTCONTPROV_PSY_ALL_CORE
[facility] ([facility]) ECT Suite at [phone]

## 2022-10-13 NOTE — ECT AMBULATORY DISCHARGE PLAN - NSDCPEFALRISK_GEN_ALL_CORE
For information on Fall & Injury Prevention, visit: https://www.[facility]/news/fall-prevention-protects-and-maintains-health-and-mobility OR  https://www.[facility]/news/fall-prevention-tips-to-avoid-injury OR  https://www.cdc.gov/steadi/patient.html

## 2022-11-08 LAB — SARS-COV-2 RNA SPEC QL NAA+PROBE: SIGNIFICANT CHANGE UP

## 2022-11-10 LAB — GLUCOSE BLDC GLUCOMTR-MCNC: 90 MG/DL — SIGNIFICANT CHANGE UP (ref 70–99)

## 2022-11-10 PROCEDURE — 90870 ELECTROCONVULSIVE THERAPY: CPT

## 2022-11-10 NOTE — ECT AMBULATORY DISCHARGE PLAN - PATIENT PORTAL LINK FT
You can access the FollowMyHealth Patient Portal offered by [facility] by registering at the following website: [email or web address]. By joining [employer]’s FollowMyHealth portal, you will also be able to view your health information using other applications (apps) compatible with our system.

## 2022-11-23 LAB — SARS-COV-2 RNA SPEC QL NAA+PROBE: SIGNIFICANT CHANGE UP

## 2022-11-25 LAB — GLUCOSE BLDC GLUCOMTR-MCNC: 98 MG/DL — SIGNIFICANT CHANGE UP (ref 70–99)

## 2022-11-25 PROCEDURE — 90870 ELECTROCONVULSIVE THERAPY: CPT

## 2022-11-25 NOTE — ECT TREATMENT NOTE - NSECTCPTCODE_PSY_ALL_CORE
24 y/o F with PMH of PCOS presents c/o right-sided lower back pain 84380 (ECT-Electroconvulsive Therapy) 24 y/o F with PMH of PCOS p/w 2 days of intermittent, severe, stabbing, right-sided lower back pain and right flank pain. No aggravating/alleviating factors. Pt reports severe vaginal/groin pain this morning as well as urgency, dysuria and gross hematuria. Was seen at [facility] this evening and referred here to R/O kidney stone.    Denies fever, chills, dizziness, syncope, CP, SOB, palpitations, abdo pain, N/V/D, vaginal discharge

## 2022-11-25 NOTE — ECT AMBULATORY DISCHARGE PLAN - PATIENT PORTAL LINK FT
You can access the FollowMyHealth Patient Portal offered by [facility] by registering at the following website: [email or web address]. By joining [employer]’s FollowMyHealth portal, you will also be able to view your health information using other applications (apps) compatible with our system.

## 2022-11-25 NOTE — ECT AMBULATORY DISCHARGE PLAN - NSPOSTECTCALLBEFORE_PSY_ALL_CORE
[facility] ([facility]) scheduling office at [phone]

## 2022-12-20 LAB — SARS-COV-2 RNA SPEC QL NAA+PROBE: SIGNIFICANT CHANGE UP

## 2022-12-22 LAB — GLUCOSE BLDC GLUCOMTR-MCNC: 86 MG/DL — SIGNIFICANT CHANGE UP (ref 70–99)

## 2022-12-22 PROCEDURE — 90870 ELECTROCONVULSIVE THERAPY: CPT

## 2022-12-22 NOTE — ECT AMBULATORY DISCHARGE PLAN - NSDCPEFALRISK_GEN_ALL_CORE
For information on Fall & Injury Prevention, visit: https://www.[facility]/news/fall-prevention-protects-and-maintains-health-and-mobility OR  https://www.[facility]/news/fall-prevention-tips-to-avoid-injury OR  https://www.cdc.gov/steadi/patient.html

## 2022-12-22 NOTE — ECT AMBULATORY DISCHARGE PLAN - PATIENT PORTAL LINK FT
You can access the FollowMyHealth Patient Portal offered by [facility] by registering at the following website: [email or web address]. By joining [email or web address]’s FollowMyHealth portal, you will also be able to view your health information using other applications (apps) compatible with our system.

## 2023-01-19 NOTE — PROGRESS NOTE ADULT - PROBLEM SELECTOR PLAN 7
HR=73 bpm, SAKB=432/87 mmhg, NkG3=080.0 %, Resp=12 B/min, EtCO2=25 mmHg, Apnea=3 Seconds, Pain=0, Snow=3 DVT ppx: Lovenox SQ 40mg QD  GOC - DNR/DNI/DNH- MOLST in chart - comfort measures only  Dispo: Pt recommends rehab, but family would like transfer back to [facility]  Diet: Regular

## 2023-01-20 LAB — GLUCOSE BLDC GLUCOMTR-MCNC: 85 MG/DL — SIGNIFICANT CHANGE UP (ref 70–99)

## 2023-01-20 PROCEDURE — 90870 ELECTROCONVULSIVE THERAPY: CPT

## 2023-01-20 NOTE — ECT AMBULATORY DISCHARGE PLAN - NSDCPEFALRISK_GEN_ALL_CORE
For information on Fall & Injury Prevention, visit: https://www.[facility]/news/fall-prevention-protects-and-maintains-health-and-mobility OR  https://www.[facility]/news/fall-prevention-tips-to-avoid-injury OR  https://www.cdc.gov/steadi/patient.html

## 2023-01-20 NOTE — ECT AMBULATORY DISCHARGE PLAN - PATIENT PORTAL LINK FT
You can access the FollowMyHealth Patient Portal offered by [facility] by registering at the following website: [email or web address]. By joining [employer]’s FollowMyHealth portal, you will also be able to view your health information using other applications (apps) compatible with our system.

## 2023-02-22 LAB — GLUCOSE BLDC GLUCOMTR-MCNC: 95 MG/DL — SIGNIFICANT CHANGE UP (ref 70–99)

## 2023-02-22 PROCEDURE — 90870 ELECTROCONVULSIVE THERAPY: CPT

## 2023-02-22 NOTE — ECT PRE-PROCEDURE CHECKLIST - NS PREOP CHK TEST_COVID_DT_GEN_ALL_CORE
10-Oct-2022
23-Aug-2022
22-Nov-2022
20-Feb-2023
25-Jun-2022
19-Dec-2022
07-Nov-2022
03-Jul-2022
08-Aug-2022
12-Sep-2022
17-Jan-2023
17-Jun-2022
19-Jul-2022

## 2023-02-22 NOTE — ECT AMBULATORY DISCHARGE PLAN - NSDCPEFALRISK_GEN_ALL_CORE
For information on Fall & Injury Prevention, visit: https://www.[facility]/news/fall-prevention-protects-and-maintains-health-and-mobility OR  https://www.[facility]/news/fall-prevention-tips-to-avoid-injury OR  https://www.cdc.gov/steadi/patient.html

## 2023-02-22 NOTE — ECT AMBULATORY DISCHARGE PLAN - PATIENT PORTAL LINK FT
You can access the FollowMyHealth Patient Portal offered by [facility] by registering at the following website: [email or web address]. By joining [employer]’s FollowMyHealth portal, you will also be able to view your health information using other applications (apps) compatible with our system.

## 2023-03-20 NOTE — H&P ADULT - NSICDXFAMILYHX_GEN_ALL_CORE_FT
FAMILY HISTORY:  No pertinent family history in first degree relatives Skin Substitute Paste Text: The defect edges were debeveled with a #15 scalpel blade.  Given the location of the defect, shape of the defect and the proximity to free margins a skin substitute micronized graft was deemed most appropriate.  The entire vial contents were admixed with 0.5ccs of sterile saline, formed into a paste and then evenly spread over the entire wound bed.

## 2023-03-23 LAB — GLUCOSE BLDC GLUCOMTR-MCNC: 90 MG/DL — SIGNIFICANT CHANGE UP (ref 70–99)

## 2023-03-23 PROCEDURE — 90870 ELECTROCONVULSIVE THERAPY: CPT

## 2023-03-23 NOTE — ECT TREATMENT NOTE - NSCGISEVERILLNESS_PSY_ALL_CORE
1 = Normal – not at all ill, symptoms of disorder not present past seven days
3 = Mildly ill – clearly established symptoms with minimal, if any, distress or difficulty in social and occupational function
1 = Normal – not at all ill, symptoms of disorder not present past seven days
1 = Normal – not at all ill, symptoms of disorder not present past seven days
3 = Mildly ill – clearly established symptoms with minimal, if any, distress or difficulty in social and occupational function
1 = Normal – not at all ill, symptoms of disorder not present past seven days
3 = Mildly ill – clearly established symptoms with minimal, if any, distress or difficulty in social and occupational function
1 = Normal – not at all ill, symptoms of disorder not present past seven days
2 = Borderline mentally ill – subtle or suspected pathology

## 2023-03-23 NOTE — ECT AMBULATORY DISCHARGE PLAN - PATIENT PORTAL LINK FT
You can access the FollowMyHealth Patient Portal offered by [facility] by registering at the following website: [email or web address]. By joining [employer]’s FollowMyHealth portal, you will also be able to view your health information using other applications (apps) compatible with our system.

## 2023-03-23 NOTE — ECT TREATMENT NOTE - NSCGIIMPROVESX_PSY_ALL_CORE
2 = Much improved - notably better with signficant reduction of symptoms; increase in the level of functioning but some symptoms remain
1 - Very much improved - nearly all better; good level of functioning; minimal symptoms; represents a very substantial change
2 = Much improved - notably better with signficant reduction of symptoms; increase in the level of functioning but some symptoms remain

## 2023-04-14 NOTE — BH INPATIENT PSYCHIATRY PROGRESS NOTE - NSDCCRITERIA_PSY_ALL_CORE
It was a pleasure to see Felix today!  -His growth and development is great!  -His exam is good  -He does have a double ear infection today. We will treat with amoxicillin 6.6 ml twice daily for 10 days. Yogurt or probiotic for diarrhea. Tylenol or ibuprofen as needed. If not better when done with antibiotics, please call  -Okay to switch to whole milk whenever you would like. Limit to 24 oz per day  -Vaccines  -Lead screening  -Tylenol dose is 5 ml every 4 hours as needed  -Infant ibuprofen dose is 2.5 ml every 6 hours as needed  -Children's ibuprofen dose is 5.5 ml every 6 hours as needed  -Next visit in 3 months or sooner if concerns          Your Child's Health  12-Month-Old Visit      Felix SANFORD Misty  April 14, 2023    Visit Vitals  Pulse 124   Temp 98 °F (36.7 °C) (Axillary)   Ht 30.75\" (78.1 cm)   Wt (!) 11.9 kg (26 lb 3.8 oz)   HC 47.5 cm (18.7\")   BMI 19.51 kg/m²           Weight: 26.23 lbs    YOUR CHILD'S 12 MONTH OLD VISIT       Key points at this age…  Correct use of a car seat is always critically important for your baby’s safety. When you replace the infant car seat, make sure to get a convertible car seat that can be kept rear facing until your child reaches the top height or weight limit allowed by the car seat’s .        Get your child’s dental health off to a good start by starting regular brushing habits (with regular fluoridated toothpaste--not baby toothpaste) and discontinuing the bottle as soon as possible.    Consistency is critical right now--consistent bedtime routines will help your baby sleep well and consistent responses to how they are behaving will help them learn what is “right” and what is “wrong”.     NUTRITION:   As your baby’s growth slows down after their first birthday, their appetite may vary from day to day--this is normal. It is also common for them to prefer a few particular foods for days (or weeks!) and to sometimes eat one big meal a day and not really care 
about eating for the rest of the day. You should simply offer healthy foods in small portions. After eating that, if your child still seems hungry, give them another small portion. Continue to be careful to avoid hard, chunky or chewy foods that your child could choke on. And remember that their likes and dislikes will change rapidly, so don’t be afraid to try again with something that ended up on the floor a couple weeks ago. Keep avoiding sweets, junk food and sweet drinks (fruit drinks, soda)--there’s no reason to start those unhealthy habits this early in life!    A switch from formula to cow’s milk is recommended at age 12 months (if your baby is not sensitive to dairy products).  Whole milk is often recommended at the time of the switch because of its higher fat content. However, if your child is getting a healthy variety of table foods or if your child is at risk for becoming overweight (a family history of obesity, high blood pressure or heart disease), 2% milk (low fat) is okay. Your child should have between 16 and 24 ounces of milk per day.    If your child is still nursing, you can start supplementing with beverages from a cup to ease the transition to independent drinking.    Eating fruit is much healthier than drinking juice. Even \"natural\" juices have extra sugars that can lead to tooth decay and weight gain. Children between 1 and 3 years of age should have no more than 4 ounces of 100% fruit juice daily. Do not water it down in a bottle or sippy cup that they can carry around and drink from over an extended period of time; this frequent exposure to the sugars in juice (even if diluted with water) just increases their risk of tooth decay.      Most toddlers who eat a varied diet will be getting adequate vitamins-- with the exception of vitamin D. For that reason, a straight vitamin D supplement (with either 400 or 600 IU per dose) or a multivitamin preparation with iron is reasonable. (Liquid 
preparations are available for infants. They should not be given chewable vitamins because they could choke on those.)      DEVELOPMENT/BEHAVIOR:    Many children at this age are walking (or soon will be). There is a wide range of “normal”, however, and some children will not walk until between 15 and 18 months. (Encourage your child to walk by letting them move around without help and try not to pick them up too often!) At this age they will also be able to drink from a cup, point or gesture to things they are interested in, babble and say 1 or 2 words.     Mobility and increasing independence happen together around this age--this makes things challenging! Not only do they have to be watched more carefully (they can get into more things than they could before!), but they will start wanting to have their own way more often. So, it is an important time to make sure they understand (or to teach them) that what they choose to do is okay or not okay. (If you laugh when they are hugging their favorite stuffed toy, but you also laugh when they throw food on the floor, they aren’t learning which is right and which is wrong.) Giving immediate, consistent feedback to a young one is the best way to let them know which actions are okay and which are not okay. Keep it simple--long lectures don’t work in this age group. Distracting them from unwanted behaviors by offering something else (another toy, a book, even a snuggle and a hug) can be very effective as well.     Remember, kids want your approval and your positive attention. So give kind words and smiles when they are behaving in a good way. When they are doing something you don’t like, turn away, ignore them or say something simple like “that’s not nice”.  And don’t wait until your child is misbehaving to give them feedback--being proactive (“catch them being good” or “time in”) is the best way for them to learn early what type of behavior will earn your positive attention 
and smiles. Our busy world and the constant presence of cell phones make it easy to ignore your child when they are behaving well--but that’s exactly the time you should be telling them that they are doing the right thing!    Try to avoid using the word “no” as much as you can. That’s an easy word for them to learn to say back to you, plus it only tells them what you do not want them to do-- it doesn’t really teach them a better option. Try saying “let’s not do this” followed by “let’s do this” more often; save “no” for situations when your baby might be getting in harm’s way. It will mean more if you don’t say it all the time.   DENTAL CARE:   A very important daily routine is teeth brushing. Establish regular times each day for this task, ideally after breakfast and before bed. As soon as babies have teeth, you should be brushing them twice a day with a tiny smear (the size of a grain of rice!) of regular (fluoridated) toothpaste. (You don’t need to buy baby toothpaste.) Fluoride is very important for your child’s dental health. In addition to brushing with fluoridated toothpaste, your child should be drinking the tap (city) water (which is carefully monitored so it has the ideal amount of fluoride for dental health). If you have well water instead of a city water supply, however, you should have it tested for fluoride content to determine whether your child might need fluoride supplements.    It’s also important to get your child OFF the bottle as soon as possible. At this point, the bottle only causes problems, particularly tooth decay! If your child insists on a bottle, try putting only water in the bottle and everything else in a cup.     CAR SAFETY:   An approved car seat in the back seat of the car is required by [place] law. Your child is safest in a rear-facing car seat, so replace your infant car seat with a convertible car seat that can be kept rear facing until your child reaches the top height or 
cgi<=2  No SI  possible CAAL
weight limit allowed by the car seat’s .       ACCIDENT PREVENTION:  Setting up your home to be safe can also help shape your child’s behavior. When there are less things that are off limits to them, then you will be saying “no” and redirecting them less often.   1.  Falls: Try to remove furniture with sharp edges which can result in cuts and lacerations for toddling children (who fall frequently!).  Keep ulloa on stairs and window latches on upper-story windows.  2.  Burns: Begin to safely teach your child what “hot” and “cold” mean.  Have a family fire safety plan, including regular smoke detector (and carbon monoxide detector) battery checks, working fire extinguishers, and escape routes.  3.  Poisoning: Keep all cleaning and chemical products safely stored out of sight and out of reach. Pay attention to what is under your bathroom sink as well as your kitchen sink. Keep purses (your own and ones belonging to visitors) out of reach of curious toddlers; they can quickly find medicines, cigarettes, and small objects to choke on!     For all medicines, including vitamins, keep the original safety caps that came with the bottle; do not transfer medicines to non-child-proofed or unlabeled containers. Be especially careful when around older people because they may keep their medicines out in the open or in non-childproofed containers.   Call the Poison Center at 1-146.913.1623 for any known or suspected poisoning (if you haven’t already, put this phone number in your phone for quick reference!).    LEAD EXPOSURE: If there is any lead in your home or yard, crawling and toddling children are at risk for lead poisoning because they are moving around on their own and touching so many things. Lead poisoning can have a harmful and irreversible effect on your child’s behavior, intelligence and learning potential, so it is very important to prevent and screen for lead exposure. If you live in [facility], your 
child should be screened for lead now (at age 12 months). If you do not live in [facility], talk to your doctor about lead screening if any of the following apply: (1) your child currently (or had previously) lives in or frequently visits a house or building built before 1950 (including , grandparent homes, etc); (2) your child currently (or had previously) lives in or frequently visits a house or building built before 1978 with recent or ongoing renovations; (3) your child has a brother, sister or playmate who has had lead poisoning; (4) your child is enrolled in Medicaid or WIC.  If you have questions about older neighborhoods in [place] that might have lead connecting (or service) pipes, do an internet search for \"[place] lead awareness and drinking water safety\". From this web page, you can search for your address to find out if your home was built with lead service lines. There are also helpful hints regarding water safety on this site.         SLEEP: Establishing a consistent bedtime routine at this age can help develop good long term sleep habits.   Create a nightly routine before bed starting with brushing teeth, spending quiet time with your child (read and sing to them), then ending with them soothing themselves to sleep in their crib. Be consistent with your routine!   Avoid overly stimulating activities before bedtime, including active play, stimulating games or videos.   A favorite toy and a nightlight are often helpful.   Make sure your child does not nap too late in the afternoon (they need to be tired at bedtime!).  Have a consistent “wake up” time as well; this further helps young children develop a regular sleep cycle.      SUN EXPOSURE: Continue to try to protect your baby from direct sun. Keep them in the shade as much as possible and use protective clothing (including hats and sunglasses) when you are out. Always use infant sunscreens with an SPF of 15 or higher to protect from 
sunburn and long term skin damage; apply it at least 20 to 30 minutes before going out in the sun.    SMOKING:  Continue to protect your child from cigarette smoke. Exposure to cigarette smoke will increase your baby’s risk of asthma, ear infections, and respiratory infections (pneumonia). If you smoke and are ready to consider quitting, talk to your doctor. Nicotine replacement products can be very helpful in breaking this tough addiction.       SHOES:  Children this age need shoes primarily to protect their feet when they start walking outside. When you buy shoes, choose ones that have a roomy fit and flat, flexible soles with nonskid bottoms. And don’t spend too much money--at this age, they outgrow them quickly!      MEDICATION FOR FEVER OR PAIN:   Acetaminophen liquid (e.g., Tylenol or Tempra) may be given every four hours as needed for pain or fever.  Be sure to check which product CONCENTRATION you are using.    INFANT/CHILDREN’S Tylenol/Acetaminophen  (160 MG/5 mL)  Child’s Weight:            Dose:  12 - 17 pounds:           80 mg (2.5 mL  (1/2 Teaspoon))  18 - 23 pounds:           120 mg (3.75 mL (3/4 Teaspoon))      INFANT Ibuprofen (50 mg/1.25 ml) liquid (for example Advil or Motrin) may be given every 6 hours as needed for pain or fever.  Child’s Weight:            Dose:  12 - 17 pounds:           50 mg (1.25 mL)    18 - 23 pounds:           75 mg (1.875 mL)      CHILDREN'S Ibuprofen (100 mg/5 mL) liquid (for example Advil or Motrin) may be given every 6 hours as needed for pain or fever.  Child’s Weight:            Dose:  12 - 17 pounds:           50 mg (2.5 mL (1/2 Teaspoon))  18 - 23 pounds:           75 mg (3.75  mL (3/4 Teaspoon))  24 - 35 pounds           100 mg (5 mL (1 Teaspoon))    If Felix is outside these weight ranges, call your pediatrician's office for advice.    Most Recent Immunizations   Administered Date(s) Administered    DTaP/Hep B/IPV 2022    Hep B, adolescent or pediatric 
2022    Hib (PRP-OMP) 2022    Influenza, quadrivalent, preserve-free 2022    Pneumococcal Conjugate 13 Valent Vacc (Prevnar 13) 2022    Rotavirus - pentavalent 2022   Pended Date(s) Pended    Hep A, ped/adol, 2 dose 04/14/2023    MMR 04/14/2023    Varicella 04/14/2023       If Felix develops any of the following reactions within 72 hours after an immunization, notify your pediatrician by calling the pediatric phone nurse:  A temperature of 105 degrees or above  More than 3 hours of continuous crying  A shrill, high-pitched cry  A pale, limp spell  A seizure or fainting spell.  In this case, you should call 911 or go immediately to the emergency room.    NEXT VISIT:  15 MONTHS OF AGE    Thank you for entrusting your care to [facility].      Also, check out “Children’s Health” on the [facility] Blog for updates on timely topics regarding children’s health!  
cgi<=2  No SI  possible CAAL

## 2023-04-20 LAB — GLUCOSE BLDC GLUCOMTR-MCNC: 89 MG/DL — SIGNIFICANT CHANGE UP (ref 70–99)

## 2023-04-20 PROCEDURE — 90870 ELECTROCONVULSIVE THERAPY: CPT

## 2023-04-20 NOTE — ECT AMBULATORY DISCHARGE PLAN - NSDCPEFALRISK_GEN_ALL_CORE
For information on Fall & Injury Prevention, visit: [email or web address]-[email or web address]-[email or web address] OR  [email or web address]-injury OR  https://www.cdc.gov/steadi/patient.html

## 2023-04-20 NOTE — ECT AMBULATORY DISCHARGE PLAN - PATIENT PORTAL LINK FT
You can access the FollowMyHealth Patient Portal offered by [facility] by registering at the following website: [email or web address]. By joining [employer]’s FollowMyHealth portal, you will also be able to view your health information using other applications (apps) compatible with our system.

## 2023-04-20 NOTE — ECT AMBULATORY DISCHARGE PLAN - NSPOSTECTCALLBEFORE_PSY_ALL_CORE
[facility] ([facility]) scheduling office at [phone]

## 2023-05-18 LAB — GLUCOSE BLDC GLUCOMTR-MCNC: 96 MG/DL — SIGNIFICANT CHANGE UP (ref 70–99)

## 2023-05-18 PROCEDURE — 90870 ELECTROCONVULSIVE THERAPY: CPT

## 2023-05-18 NOTE — ECT AMBULATORY DISCHARGE PLAN - PATIENT PORTAL LINK FT
You can access the FollowMyHealth Patient Portal offered by [facility] by registering at the following website: [email or web address]. By joining [employer]’s FollowMyHealth portal, you will also be able to view your health information using other applications (apps) compatible with our system.

## 2023-06-11 NOTE — ED BEHAVIORAL HEALTH ASSESSMENT NOTE - HAVE YOU BEEN THINKING ABOUT HOW YOU MIGHT DO THIS?
[General Appearance - Well Developed] : well developed [Normal Appearance] : normal appearance [General Appearance - Well Nourished] : well nourished [Well Groomed] : well groomed [No Deformities] : no deformities [General Appearance - In No Acute Distress] : no acute distress [Normal Conjunctiva] : the conjunctiva exhibited no abnormalities [Eyelids - No Xanthelasma] : the eyelids demonstrated no xanthelasmas [Normal Oral Mucosa] : normal oral mucosa [No Oral Pallor] : no oral pallor [Normal Jugular Venous A Waves Present] : normal jugular venous A waves present [No Oral Cyanosis] : no oral cyanosis [Normal Jugular Venous V Waves Present] : normal jugular venous V waves present [No Jugular Venous Cline A Waves] : no jugular venous cline A waves [Respiration, Rhythm And Depth] : normal respiratory rhythm and effort [Exaggerated Use Of Accessory Muscles For Inspiration] : no accessory muscle use [Auscultation Breath Sounds / Voice Sounds] : lungs were clear to auscultation bilaterally [Heart Sounds] : normal S1 and S2 [Heart Rate And Rhythm] : heart rate and rhythm were normal [Murmurs] : no murmurs present [Abdomen Soft] : soft [Abdomen Tenderness] : non-tender [Abdomen Mass (___ Cm)] : no abdominal mass palpated [Abnormal Walk] : normal gait [Gait - Sufficient For Exercise Testing] : the gait was sufficient for exercise testing [Nail Clubbing] : no clubbing of the fingernails [Cyanosis, Localized] : no localized cyanosis [Petechial Hemorrhages (___cm)] : no petechial hemorrhages [Skin Color & Pigmentation] : normal skin color and pigmentation [No Venous Stasis] : no venous stasis [] : no rash [Skin Lesions] : no skin lesions [No Skin Ulcers] : no skin ulcer [No Xanthoma] : no  xanthoma was observed [Affect] : the affect was normal [Oriented To Time, Place, And Person] : oriented to person, place, and time [Mood] : the mood was normal [No Anxiety] : not feeling anxious Yes

## 2023-06-15 LAB — GLUCOSE BLDC GLUCOMTR-MCNC: 104 MG/DL — HIGH (ref 70–99)

## 2023-06-15 PROCEDURE — 90870 ELECTROCONVULSIVE THERAPY: CPT

## 2023-06-15 NOTE — ECT AMBULATORY DISCHARGE PLAN - NSDCPEFALRISK_GEN_ALL_CORE
For information on Fall & Injury Prevention, visit: [email or web address]-[email or web address] OR  [email or web address]-injury OR  https://www.cdc.gov/steadi/patient.html

## 2023-06-15 NOTE — ECT AMBULATORY DISCHARGE PLAN - PATIENT PORTAL LINK FT
You can access the FollowMyHealth Patient Portal offered by [facility] by registering at the following website: [email or web address]. By joining [employer]’s FollowMyHealth portal, you will also be able to view your health information using other applications (apps) compatible with our system.

## 2023-06-15 NOTE — ECT AMBULATORY DISCHARGE PLAN - NSPOSTECTCALLBEFORE_PSY_ALL_CORE
[facility] ([facility]) scheduling office at [phone]

## 2023-07-13 LAB
GLUCOSE BLDC GLUCOMTR-MCNC: 106 MG/DL — HIGH (ref 70–99)
GLUCOSE BLDC GLUCOMTR-MCNC: 132 MG/DL — HIGH (ref 70–99)

## 2023-07-13 PROCEDURE — 90870 ELECTROCONVULSIVE THERAPY: CPT

## 2023-07-13 NOTE — ECT AMBULATORY DISCHARGE PLAN - NSDCPEFALRISK_GEN_ALL_CORE
For information on Fall & Injury Prevention, visit: [email or web address]/[email or web address]-[email or web address]-[email or web address]-mobility OR  [email or web address].[facility]/[email or web address]-[email or web address]-[email or web address]-avoid-injury OR  https://www.cdc.gov/steadi/patient.html

## 2023-08-06 NOTE — ED ADULT NURSE NOTE - NSPATIENTFLAG_GEN_A_ER
Emergency Department Provider Note       PCP: On File Not (Inactive)   Age: 69 y.o. Sex: male     DISPOSITION Decision To Discharge 08/06/2023 11:51:43 AM       ICD-10-CM    1. Right leg pain  M79.604       2. Muscle strain  T14. 8XXA           Medical Decision Making     Complexity of Problems Addressed:  Complexity of Problem: 1 acute, uncomplicated illness or injury. Data Reviewed and Analyzed:  Category 1:   I independently ordered and reviewed each unique test.  I reviewed external records: ED visit note from an outside group. I reviewed external records: provider visit note from PCP. I reviewed external records: provider visit note from outside specialist.       Category 2:   I interpreted the X-rays. Plain images of the right hip and right knee negative for fracture or other evidence of acute injury    Category 3: Discussion of management or test interpretation. 74-year-old male patient presents with right-sided leg pain. States that he was bending over to pick something up off the floor when he felt a pop and fell  No other injuries from the fall  Imaging today is unremarkable  There is no distal edema or pulse deficit  We will treat for muscle strain  Follow-up discussed with patient       Risk of Complications and/or Morbidity of Patient Management:  Prescription drug management performed and Shared medical decision making was utilized in creating the patients health plan today.     History     Reed Sender is a 69 y.o. male who presents to the Emergency Department with chief complaint of    Chief Complaint   Patient presents with    Leg Pain      74-year-old male patient brought in by EMS  Complaining of right thigh and knee pain  Apparently the patient bent over to pick something up off the ground when he felt a pop in his knee and the back of the thigh area and fell  He did not strike his head or lose consciousness  He has no head neck or back pain  Complaining of mostly posterior distal Purple DH (Discharge Huddle; Vulnerable Patient)

## 2023-08-09 NOTE — ED BEHAVIORAL HEALTH ASSESSMENT NOTE - NSSUICPROTFACT_PSY_ALL_CORE
09-Aug-2023 Responsibility to children, family, or others/Identifies reasons for living/Supportive social network of family or friends

## 2023-08-10 LAB — GLUCOSE BLDC GLUCOMTR-MCNC: 98 MG/DL — SIGNIFICANT CHANGE UP (ref 70–99)

## 2023-09-08 LAB — GLUCOSE BLDC GLUCOMTR-MCNC: 106 MG/DL — HIGH (ref 70–99)

## 2023-09-08 PROCEDURE — 90870 ELECTROCONVULSIVE THERAPY: CPT

## 2023-09-08 NOTE — ECT AMBULATORY DISCHARGE PLAN - NSDCPEFALRISK_GEN_ALL_CORE
For information on Fall & Injury Prevention, visit: [email or web address].[email or web address]/[email or web address]-[email or web address]-[email or web address]-[email or web address]-mobility OR  [email or web address].[facility]/[email or web address]-prevention-tips-[email or web address]-avoid-injury OR  https://www.cdc.gov/steadi/patient.html

## 2023-09-08 NOTE — ECT AMBULATORY DISCHARGE PLAN - PATIENT PORTAL LINK FT
You can access the FollowMyHealth Patient Portal offered by [facility] by registering at the following website: [email or web address]. By joining [email or web address]’s FollowMyHealth portal, you will also be able to view your health information using other applications (apps) compatible with our system.

## 2023-10-05 LAB — GLUCOSE BLDC GLUCOMTR-MCNC: 107 MG/DL — HIGH (ref 70–99)

## 2023-10-05 PROCEDURE — 90870 ELECTROCONVULSIVE THERAPY: CPT

## 2023-10-05 NOTE — ECT AMBULATORY DISCHARGE PLAN - NSPOSTECTCONTPROV_PSY_ALL_CORE
[facility] ([facility]) ECT Suite at [phone]

## 2023-10-05 NOTE — ECT AMBULATORY DISCHARGE PLAN - NSDCPEFALRISK_GEN_ALL_CORE
For information on Fall & Injury Prevention, visit: [email or web address].[email or web address]/[email or web address]-[email or web address]-[email or web address]-mobility OR  [email or web address]/[email or web address]-prevention-tips-to-avoid-injury OR  https://www.cdc.gov/steadi/patient.html

## 2023-10-05 NOTE — ECT AMBULATORY DISCHARGE PLAN - PATIENT PORTAL LINK FT
You can access the FollowMyHealth Patient Portal offered by [facility] by registering at the following website: [email or web address]. By joining [employer]’s FollowMyHealth portal, you will also be able to view your health information using other applications (apps) compatible with our system.

## 2023-11-02 LAB
GLUCOSE BLDC GLUCOMTR-MCNC: 97 MG/DL — SIGNIFICANT CHANGE UP (ref 70–99)
GLUCOSE BLDC GLUCOMTR-MCNC: 97 MG/DL — SIGNIFICANT CHANGE UP (ref 70–99)

## 2023-11-02 PROCEDURE — 90870 ELECTROCONVULSIVE THERAPY: CPT

## 2023-11-02 NOTE — ECT AMBULATORY DISCHARGE PLAN - PATIENT PORTAL LINK FT
You can access the FollowMyHealth Patient Portal offered by [facility] by registering at the following website: [email or web address]. By joining [employer]’s FollowMyHealth portal, you will also be able to view your health information using other applications (apps) compatible with our system.

## 2023-11-30 LAB
GLUCOSE BLDC GLUCOMTR-MCNC: 96 MG/DL — SIGNIFICANT CHANGE UP (ref 70–99)
GLUCOSE BLDC GLUCOMTR-MCNC: 96 MG/DL — SIGNIFICANT CHANGE UP (ref 70–99)

## 2023-11-30 PROCEDURE — 90870 ELECTROCONVULSIVE THERAPY: CPT

## 2023-11-30 NOTE — ECT AMBULATORY DISCHARGE PLAN - NSPOSTECTCALLBEFORE_PSY_ALL_CORE
[facility] ([facility]) scheduling office at [phone]

## 2023-11-30 NOTE — ECT AMBULATORY DISCHARGE PLAN - PATIENT PORTAL LINK FT
You can access the FollowMyHealth Patient Portal offered by [facility] by registering at the following website: [email or web address]. By joining [employer]’s FollowMyHealth portal, you will also be able to view your health information using other applications (apps) compatible with our system.

## 2023-11-30 NOTE — ECT AMBULATORY DISCHARGE PLAN - NSDCPEFALRISK_GEN_ALL_CORE
For information on Fall & Injury Prevention, visit: [email or web address]/[email or web address]-prevention-protects-[email or web address]-maintains-health-[email or web address]-mobility OR  [email or web address].[facility]/news/fall-prevention-tips-to-avoid-injury OR  https://www.cdc.gov/steadi/patient.html

## 2024-01-09 LAB
GLUCOSE BLDC GLUCOMTR-MCNC: 94 MG/DL — SIGNIFICANT CHANGE UP (ref 70–99)
GLUCOSE BLDC GLUCOMTR-MCNC: 94 MG/DL — SIGNIFICANT CHANGE UP (ref 70–99)

## 2024-01-09 PROCEDURE — 90870 ELECTROCONVULSIVE THERAPY: CPT

## 2024-01-09 NOTE — ECT AMBULATORY DISCHARGE PLAN - NSPOSTECTADDSCHEDAPPTS_PSY_ALL_CORE
covid testing  72 hours of treatment 
covid testing 72 hours of treatment
pending treatment plan
Needs updated treatment plan from psychiatrist 
covid testing 72 hours of treatmenr
COVID TESTING WITHIN 3 DAYS OF SCHEDULED TREATMENT
Covid test on 10/11 between 4-6pm  Medical clearance due by her next appt
COVID TEST WITHIN 3 DAYS OF TREATMENT
Covid 19 test appointment 8/2/22 between 4pm-6pm
COVID TEST WITHIN 3 DAYS OF TREATMENT
COVID TEST WITHIN 3 DAYS OF TREATMENT
COVID TESTING WITHIN 3 DAYS OF TREATMENT
covid testing 72 hours of treatment 
Medical clearance due before the next appointment
COVID TEST WITHIN 3 DAYS OF TREATMENT
COVID TESTING WITHIN 3 DAYS OF SCHEDULED TREATMENT
reminder for treatment plan

## 2024-01-09 NOTE — ECT AMBULATORY DISCHARGE PLAN - NSDCPEFALRISK_GEN_ALL_CORE
For information on Fall & Injury Prevention, visit: [email or web address]/[email or web address]-prevention-protects-[email or web address]-maintains-health-and-mobility OR  https://www.[facility]/news/fall-prevention-tips-to-avoid-injury OR  https://www.cdc.gov/steadi/patient.html For information on Fall & Injury Prevention, visit: https://www.[facility]/news/fall-prevention-protects-and-maintains-health-and-mobility OR  https://www.[facility]/news/fall-prevention-tips-to-avoid-injury OR  https://www.cdc.gov/steadi/patient.html

## 2024-01-09 NOTE — ECT AMBULATORY DISCHARGE PLAN - NSPOSTECTCALLBEFORE_PSY_ALL_CORE
[facility] ([facility]) scheduling office at [phone] [facility] ([facility]) scheduling office at [phone]

## 2024-01-09 NOTE — ECT AMBULATORY DISCHARGE PLAN - NSPOSTECTCONTPROV_PSY_ALL_CORE
[facility] ([facility]) ECT Suite at [phone] [facility] ([facility]) ECT Suite at [phone]

## 2024-01-09 NOTE — ECT AMBULATORY DISCHARGE PLAN - NSPOSTECTCALLZHH_PSY_ALL_CORE
Call the [facility] ECT suite at [phone] Call the [facility] ECT suite at [phone]

## 2024-01-09 NOTE — ECT AMBULATORY DISCHARGE PLAN - PATIENT PORTAL LINK FT
You can access the FollowMyHealth Patient Portal offered by [facility] by registering at the following website: [email or web address]. By joining [employer]’s FollowMyHealth portal, you will also be able to view your health information using other applications (apps) compatible with our system. You can access the FollowMyHealth Patient Portal offered by [facility] by registering at the following website: [email or web address]. By joining [email or web address]’s FollowMyHealth portal, you will also be able to view your health information using other applications (apps) compatible with our system.

## 2024-01-12 NOTE — ED BEHAVIORAL HEALTH ASSESSMENT NOTE - SELF INJURIOUS BEHAVIOR WITHOUT SUICIDAL INTENT:
Preferred method of results communication: LiveWell          Health Maintenance Due   Topic Date Due    Shingles Vaccine (2 of 2) 08/09/2023    Diabetes Foot Exam  10/17/2023    Medicare Advantage- Medicare Wellness Visit  01/01/2024    Diabetes A1C  01/14/2024              Patient is due for topics as listed above but is not proceeding with Immunization(s) Shingles, Diabetes Foot Exam, and MWV (Medicare Wellness Visit) at this time.           Recent PHQ 2/9 Score       PHQ 2:     PHQ 2 Score Adult PHQ 2 Score Adult PHQ 2 Interpretation Little interest or pleasure in activity?   7/14/2023   1:12 PM 4 Further screening needed 2          PHQ 9:     PHQ 9 Score Adult PHQ 9 Score Adult PHQ 9 Interpretation   7/14/2023   1:12 PM 13 Moderate Depression                None known

## 2024-01-19 NOTE — ED PROVIDER NOTE - NSFOLLOWUPINSTRUCTIONS_ED_ALL_ED_FT
Chief Complaint   Patient presents with    Follow-up     BP (!) 201/94   Pulse 76   Temp 98.6 °F (37 °C)   Resp 16   Ht 1.676 m (5' 6\")   Wt 91.6 kg (202 lb)   SpO2 98%   BMI 32.60 kg/m²   1. Have you been to the ER, urgent care clinic since your last visit?  Hospitalized since your last visit?No    2. Have you seen or consulted any other health care providers outside of the [facility] since your last visit?  Include any pap smears or colon screening. No     ulcerations, and pruritus  Hematologic:  negative for easy bruising and bleeding  Musculoskel: negative for arthralgias, muscle weakness,and joint pain/swelling  Neurological:  negative for headaches, dizziness, vertigo,and memory/gait problems  Behavl/Psych: negative for feelings of anxiety, depression, mood changes, and suicidal ideation    Past Medical History:   Diagnosis Date    Diabetes (HCC)     GERD (gastroesophageal reflux disease)     Hypercholesterolemia     Hypertension     Kidney failure     Other ill-defined conditions(799.89)     high cholesterol       Past Surgical History:   Procedure Laterality Date    COLONOSCOPY  09/26/2011 09/26/2011 - polyp removed, repeat in 5 years    HEENT      tonsilectomy age 5       Current Outpatient Medications   Medication Sig    sulfamethoxazole-trimethoprim (BACTRIM DS;SEPTRA DS) 800-160 MG per tablet Take 1 tablet by mouth 2 times daily for 7 days    Finerenone (KERENDIA) 20 MG TABS Take 20 mg by mouth daily    ferrous sulfate (IRON 325) 325 (65 Fe) MG tablet TAKE ONE TABLET BY MOUTH ONE TIME DAILY BEFORE BREAKFAST    Insulin Degludec (TRESIBA FLEXTOUCH) 200 UNIT/ML SOPN Inject 52 Units into the skin daily    dulaglutide (TRULICITY) 1.5 MG/0.5ML SC injection Inject 0.5 mLs into the skin once a week To REPLACE Ozempic, since no longer covered by insurance.    valsartan (DIOVAN) 320 MG tablet Take 1 tablet by mouth daily    simvastatin (ZOCOR) 10 MG tablet Take 1 tablet by mouth nightly    doxazosin (CARDURA) 4 MG tablet Take 1 tablet by mouth daily    amLODIPine (NORVASC) 10 MG tablet Take 1 tablet by mouth daily    fexofenadine (ALLEGRA) 180 MG tablet Take 1 tablet by mouth daily    Blood Glucose Monitoring Suppl (BLOOD GLUCOSE MONITOR SYSTEM) w/Device KIT Please use to check blood glucose level daily.    Alcohol Swabs (ALCOHOL PREP) PADS Use to cleanse skin before testing blood glucose daily.    blood glucose monitor strips Test 2 times a day & as needed for  continue present medications  follow up with doctor in 3 - 5 days for referral to psychiatry

## 2024-02-08 LAB — GLUCOSE BLDC GLUCOMTR-MCNC: 94 MG/DL — SIGNIFICANT CHANGE UP (ref 70–99)

## 2024-02-08 PROCEDURE — 90870 ELECTROCONVULSIVE THERAPY: CPT

## 2024-02-08 NOTE — ECT AMBULATORY DISCHARGE PLAN - NSPOSTDCOFCARE_PSY_ALL_CORE
You will receive a follow-up phone call from a nurse by the next business day after your treatment to ask how you are feeling.

## 2024-02-08 NOTE — ECT PRE-PROCEDURE CHECKLIST - AS BP NONINV SITE
left upper arm
left upper arm
right upper arm
left upper arm
right upper arm
left upper arm
right upper arm
left upper arm
right upper arm
left upper arm
left upper arm
right upper arm

## 2024-02-08 NOTE — ECT AMBULATORY DISCHARGE PLAN - NSPOSTECTCALLBEFORE_PSY_ALL_CORE
[facility] ([facility]) scheduling office at [phone]

## 2024-02-08 NOTE — ECT PRE-PROCEDURE CHECKLIST - AS BP NONINV METHOD
electronic

## 2024-02-08 NOTE — ECT AMBULATORY DISCHARGE PLAN - PATIENT PORTAL LINK FT
You can access the FollowMyHealth Patient Portal offered by [facility] by registering at the following website: [email or web address]. By joining [employer]’s FollowMyHealth portal, you will also be able to view your health information using other applications (apps) compatible with our system.

## 2024-03-03 NOTE — DIETITIAN INITIAL EVALUATION ADULT - FACTORS AFF FOOD INTAKE
Pre-op Diagnosis: Urolithiasis [N20.9]    The above referenced H&P was reviewed by Scooby Quiles MD on 3/3/2024, the patient was examined and no significant changes have occurred in the patient's condition since the H&P was performed.  I discussed with the patient and/or legal representative the potential benefits, risks and side effects of this procedure; the likelihood of the patient achieving goals; and potential problems that might occur during recuperation.  I discussed reasonable alternatives to the procedure, including risks, benefits and side effects related to the alternatives and risks related to not receiving this procedure.  We will proceed with procedure as planned.       none

## 2024-03-07 VITALS
OXYGEN SATURATION: 94 % | DIASTOLIC BLOOD PRESSURE: 81 MMHG | TEMPERATURE: 98 F | HEART RATE: 79 BPM | SYSTOLIC BLOOD PRESSURE: 158 MMHG | RESPIRATION RATE: 18 BRPM

## 2024-03-07 LAB — GLUCOSE BLDC GLUCOMTR-MCNC: 90 MG/DL — SIGNIFICANT CHANGE UP (ref 70–99)

## 2024-03-07 PROCEDURE — 90870 ELECTROCONVULSIVE THERAPY: CPT

## 2024-03-07 NOTE — ECT TREATMENT NOTE - NSECTPOSTTXSUMMARY_PSY_ALL_CORE
The patient had a well modified grand mal seizure under general anesthesia and muscle relaxant. The patient is alert, responsive, in no acute distress.  Recovery uneventful.

## 2024-03-07 NOTE — ECT TREATMENT NOTE - NSICDXBHSECONDARYDX_PSY_ALL_CORE
Diabetes mellitus   E11.9  Myasthenia gravis   G70.00  COPD, mild   J44.9  

## 2024-03-07 NOTE — ECT TREATMENT NOTE - NSECTROSNEGAT_PSY_ALL_CORE
Review of Systems negative/unchanged from previous exam except as noted below

## 2024-03-07 NOTE — ECT TREATMENT NOTE - NSECTCOMMENTS_PSY_ALL_CORE
Mood good.  States typically she isn't the happy jolly person.  Sleep and appetite good.  Goes for walks with  when weather is good.   Will continue monthly ECT to prevent relapse.
  Second 2 week interval. Pt is bright, verbal and cooperative. She reports feeling that she is back to  her baseline and denies recurrence of any affective symptoms. Happy that has returned home. She gets physical therapy  We'll continue with biweekly treatments
Pt is calm and cooperative. she reports good mood and denies recurrence of any depressive symptoms. Agreed continuing with maintenance treatments and signed informed consent.   Weak Sz, consider increasing stim dose
Pt reports stable and good mood, reports good appetite, was gaining significant weight recently. Pt will be continued with monthly maintenance treatments.
Patient reports that she's been stable and denies any mood or psychotic sx. Said that she enjoys the company of her two nurse's aid staff. Reports good sleep and appetite. Will continue acute course.
  Pt states she is doing well.   Sleeping and eating well.  Energy good.  Pleasant, well groomed, NAD.  Appears stable. Will taper to q4 weeks 
  Has been feeling a little more "grumpy" over last 2 weeks.  Sleep and appetite are good.   Wishes to schedule in 4 week and call us if mood does not rebound or if she notices worsening depressive symptoms.   AOx3, no SI
Second monthly treatment. Pt is calm and cooperative. She reports that she felt well for 2 weeks but after that her mood started deteriorating anf felt "cranky" most of the time. Sleep nad appetite are good, but energy levels and motivation are low. She denies SI.  confirmed.  I recommended to treat again in 2 weeks  and reevaluate. Pt advised to return earlier if there is no relief after this treatment.   
  Pt is calm and cooperative. She reports feeling well with good and stable mood through the month. Sleep, appetite and energy levels at her baseline.   We'll continue with monthly maintenance    
  Pt continues to feel stable. Enjoys going for walks and watching TV.  Denies any issues with sleep or appetite.  Will try tapering to q3 weeks given report of stability over last month.       Need updated treatment plan.
Ms Albright reports doing well. Sleep: Normal, appetite; good. Energy: normal. She receives assistance form visiting nurses. Denies feeling depressed/death wish/suicidal ideation/intent/plan. 
  Denies any symptoms of relapse since tapered to monthly ECT.  Sleep and appetite are good.  "Doesn't do that much" but enjoys listening to "all types of music except Country" (smiles and laughs at this appropriately).  Appears stable.  Continue monthly ECT.  Aware we rec 6-12 months of stability before considering stopping ECT and pt agrees. 
  First 2 week interval. Pt is bright, verbal and cooperative. She reports feeling that she is back to  her baseline and denies recurrence of any affective symptoms. Sleep , appetite, energy levels' are back to normal.  Looking forward to return home next week.  We'll continue with biweekly treatments
  Pt states she is doing well.  Denies depression.  Eating and sleeping well.  Pleasant and friendly.  Will continue monthly ECT to prevent relapse. 
Ms. Albright reports doing well. Denies feeling depressed. Sleep: normal, appetite: good. Mood: euthymic Affect: appropriate.  Enjoys watching Television. Energy: low. Denies death wish/suicidal ideation/intent/plan.     Will continue ECT at monthly interval to reduce the risk for relapse. 
Patient was well kempt, calm, cooperative  Reports ongoing stability in mood symptoms. Mood, sleep, appetite, energy levels were within normal limits.   We will continue with maintenance ECT to help reduce the chances of relapse. 
Pt is calm and cooperative reporting stable and good mood. Appetite, energy levels  and sleep are good, functioning at her baseline.  We'll continue with monthly maintenance to reduce chances of relapse.  
Pt is stable, pleasant behavior, cooperative, denies any signs of relapse, we continue with monthly maintenance. 
  Mood stable.  Denies complaints.  Feels her depression has totally resolved, no residual symptoms.  Continue monthly ECT to prevent relapse. 
Fourth weekly interval. Patient stable through the entire treatment interval without recurrence of presenting symptoms. Mood, energy, sleep, and appetite were within normal limits. 
  Pt is calm, verbal, cooperative and much brighter than last visit. She reports that she was diagnosed with  UTI 2 days after the treatment and felt back to her baseline with antibiotic treatment. Sleep, mood appetite and energy levels are back within normal limits  We'll revert to monthly treatments to prevent relapse   
  Continues to report stable mood.   No depression on interview today.   Spoke about elevated BMI and risk with GA.   Pt understands and will work on healthy lifestyle changes to try to lose some weight.   Will continue monthly ECT to prevent relapse of depression
  Pt states she is doing well.  Denies feeling depressed, still feels memory is her main problem.  Not worse since last month.  Will continue monthly ECT to prevent relapse. 
PT denies any current mood or psychotic sx, denies SI, denies insomnia. Complains of subjetive memory impairment. Will cont tx as continuation (today 2nd weekly)
Patient reports staff have noted some memory deficits for her that has been chronically worsening. Patient is scheduled to follow up with her primary later this month. No changes noted in mood, sleep, or appetite. Patient denies suicidal ideation, intent or plan. Patient's weight gain was discussed and risks with anesthesia. Patient reported discussing weight loss management with PCP including discussing GLP1 agonists however was not covered under her insurance. We will continue maintenance ECT to help reduce the chances of relapse. 
  Reports some worsening in mood for the past weeks, feels more down and irritable. Denies insomnia, energy is stable.  agrees. Pt c/o gaps in autobiographical memory, mild immediate memory impairment. Psychoed performed. Will cont Qmontly, instructed to discuss it with her outp psych. Denies death wish/suicidal ideation/intent/plan.

## 2024-03-07 NOTE — ECT PRE-PROCEDURE CHECKLIST - PERIPHERAL IV: INSERTION DATE
10-Nov-2022
18-May-2023
20-Jan-2023
25-Aug-2022
13-Oct-2022
10-Aug-2023
30-Nov-2023
08-Sep-2023
22-Jun-2022
23-Mar-2023
15-Eber-2023
07-Mar-2024
21-Jul-2022
22-Feb-2023
28-Jun-2022
05-Oct-2023
02-Nov-2023
06-Jul-2022
11-Aug-2022
15-Sep-2022
08-Feb-2024
13-Jul-2023
22-Dec-2022

## 2024-03-07 NOTE — ECT AMBULATORY DISCHARGE PLAN - NSPOSTECTPROVEDUCFT_PSY_ALL_CORE
covid educational material, ECT discharge instructions
ect teaching  covid teaching
post ECT discharge instructions, covid educational material 
post ECT discharge instructions, covid educational material 
COVID EDUCATION, POST ECT INSTRUCTIONS
COVID TEACHING   ECT REINFORCED
Covid Instructions
d/c instructions
covid educational material, ECT discharge instructions
post ECT discharge instructions, covid educational material 
post ECT discharge instructions, covid educational material 
Covid 19 Precautions
ECT and COVID teaching 
post ECT discharge instructions
post ECT discharge instructions, covid educational material 
ECT discharge instructions, covid educational material 
ECT education 
post ECT discharge instructions, covid educational material 
COVID and ECT teaching 
ect teaching   covid teaching
post ECT discharge instructions, covid educational material 
Post ECT Discharge Instructions
post ECT discharge instructions, covid educational material 
COVID TEACHING   ECT REINFORCED
Covid teachings
Covid teaching and D/C instructions

## 2024-03-07 NOTE — ECT TREATMENT NOTE - NSECTIMPPLAN_PSY_ALL_CORE
Assessment today offers no contraindications to continue plan of treatment with ECT.

## 2024-03-07 NOTE — ECT AMBULATORY DISCHARGE PLAN - NSPOSTECTPTCOND_PSY_ALL_CORE
Stable

## 2024-03-07 NOTE — ECT TREATMENT NOTE - NSECTPTEVAL_PSY_ALL_CORE
Patient evaluated and History and Physical reviewed prior to ECT. There are no significant changes to the patient's condition unless specified.

## 2024-03-07 NOTE — ECT TREATMENT NOTE - NSECTTHYPULSE1ST_PSY_ALL_CORE
0.25 ms

## 2024-03-07 NOTE — ECT AMBULATORY DISCHARGE PLAN - MODE OF TRANSPORTATION
Wheelchair/Stroller
Wheelchair/Stroller/Ambulette
Wheelchair/Stroller

## 2024-03-07 NOTE — ECT PRE-PROCEDURE CHECKLIST - ALLERGY BAND ON
no known allergies

## 2024-03-07 NOTE — ECT TREATMENT NOTE - NSECTMACHPARA1ST_PSY_ALL_CORE
Thymatron

## 2024-03-07 NOTE — ECT AMBULATORY DISCHARGE PLAN - NSECTPROCEDUREDATE_PSY_ALL_CORE
20-Apr-2023
13-Oct-2022
15-Sep-2022
30-Nov-2023
23-Mar-2023
25-Nov-2022
21-Jul-2022
10-Nov-2022
13-Jul-2023
22-Feb-2023
06-Jul-2022
11-Aug-2022
20-Jan-2023
08-Feb-2024
22-Dec-2022
05-Oct-2023
15-Eber-2023
22-Jun-2022
10-Aug-2023
02-Nov-2023
07-Mar-2024
08-Sep-2023
18-May-2023
25-Aug-2022
28-Jun-2022
09-Jan-2024

## 2024-03-07 NOTE — ECT TREATMENT NOTE - NSECTTXPERFDATETIME_PSY_ALL_CORE
22-Feb-2023 11:39
09-Jan-2024 13:25
11-Aug-2022 11:17
13-Oct-2022 11:31
06-Jul-2022 12:09
13-Jul-2023 12:31
22-Dec-2022 11:11
20-Jan-2023 11:28
22-Jun-2022 09:42
10-Nov-2022 11:05
15-Eber-2023 11:22
18-May-2023 12:13
25-Nov-2022 11:35
05-Oct-2023 11:34
02-Nov-2023 11:38
15-Sep-2022 10:42
25-Aug-2022 10:59
08-Sep-2023 11:52
08-Feb-2024 12:47
07-Mar-2024 12:12
13-Jul-2023 12:31
20-Apr-2023 11:47
28-Jun-2022 12:43
23-Mar-2023 11:49
30-Nov-2023 11:10
21-Jul-2022 12:31

## 2024-03-07 NOTE — ECT PRE-PROCEDURE CHECKLIST - ALLERGIES
Allergies:-  No Known Allergies      

## 2024-03-07 NOTE — ECT PRE-PROCEDURE CHECKLIST - NSECTNPODT_PSY_ALL_CORE
08-Jan-2024 21:00
19-Apr-2023 20:00
11-Aug-2022 12:00
12-Jul-2023 19:30
14-Jun-2023 20:00
05-Jul-2022 20:00
21-Dec-2022 20:00
07-Sep-2023
07-Mar-2024 00:00
05-Oct-2023 00:00
21-Jul-2022 00:00
07-Feb-2024 18:30
01-Nov-2023 19:00
09-Aug-2023 19:30
27-Jun-2022 20:00
19-Apr-2023 18:00
22-Feb-2023
15-Sep-2022 10:00
17-May-2023 19:30
25-Aug-2022 00:00
21-Jun-2022 18:00
12-Oct-2022 20:00
23-Mar-2023 00:00
24-Nov-2022 20:00
29-Nov-2023 19:00
09-Nov-2022 21:00
20-Jan-2023 00:00

## 2024-03-07 NOTE — ECT PRE-PROCEDURE CHECKLIST - NSHOMEMEDREVIEW_PSY_ALL_CORE
done

## 2024-03-07 NOTE — ECT TREATMENT NOTE - NSECTTXELECPLACE_PSY_ALL_CORE
Right Unilateral

## 2024-03-07 NOTE — ECT PRE-PROCEDURE CHECKLIST - SELECT TESTS ORDERED
BMP/CBC/Results in MD note/POCT Blood Glucose
COVID-19
FS 90 3/23/23/POCT Blood Glucose
COVID-19
Results in MD note/COVID-19
Results in MD note
FS 83/POCT Blood Glucose/COVID-19
COVID-19
POCT Blood Glucose
Results in MD note/COVID-19
Results in MD note/COVID-19
COVID-19
Results in MD note
CMP
FS 90/POCT Blood Glucose/COVID-19
CMP/COVID-19
COVID-19
94/POCT Blood Glucose

## 2024-03-07 NOTE — ECT AMBULATORY DISCHARGE PLAN - NS TRANSFER DISPOSITION PATIENT BELONGINGS
with patient
with patient
not applicable
with patient
not applicable
with patient
given to family
with patient

## 2024-03-07 NOTE — ECT AMBULATORY DISCHARGE PLAN - NSPOSTECTCASEEMER_PSY_ALL_CORE
In case of any emergency, please go to the nearest emergency room

## 2024-03-07 NOTE — ECT AMBULATORY DISCHARGE PLAN - NSPROCPERF_PSY_ALL_CORE
Electroconvulsive Therapy (ECT)

## 2024-03-07 NOTE — ECT PRE-PROCEDURE CHECKLIST - ISOLATION PRECAUTIONS
none

## 2024-03-07 NOTE — ECT TREATMENT NOTE - NSECTREVSYS_PSY_ALL_CORE
Cardiovascular/Abdominal/Metabolic

## 2024-03-07 NOTE — ECT PRE-PROCEDURE CHECKLIST - NSMENTALSTATUS_PSY_ALL_CORE
calm
anxious
calm
anxious
calm
anxious
calm

## 2024-03-07 NOTE — ECT AMBULATORY DISCHARGE PLAN - NSPOSTECTSMOKING_PSY_ALL_CORE
If you are a smoker, it is important for your health to stop smoking.  Please be aware that second hand smoke is also harmful.

## 2024-03-07 NOTE — ECT AMBULATORY DISCHARGE PLAN - NSPOSTECTADDINSTRFUCARE_PSY_ALL_CORE
TREATMENT PLAN DUE
Covid teachings
Needs medical, labs and EKG by 3/20/24 
Reminder: medical clearance
Treatment plan to be completed by her psychiatrist. Form included. Thank you.
TREATMENT PLAN DUE, MEDICAL CLEARANCE DUE BEFORE THE NEXT APPOINTMENT

## 2024-03-07 NOTE — ECT TREATMENT NOTE - NSECTFOCPECOMPLET_PSY_ALL_CORE
Focused Physical Exam Completed

## 2024-03-07 NOTE — ECT TREATMENT NOTE - NSECTCHANGEFREQ_PSY_ALL_CORE
Decrease
No change
No change
Increase
No change
Decrease
No change
Decrease
Decrease
No change
Decrease
No change
No change

## 2024-03-07 NOTE — ECT PRE-PROCEDURE CHECKLIST - NSPROPTRIGHTNOTIFY_GEN_A_NUR
declines

## 2024-03-07 NOTE — ECT PRE-PROCEDURE CHECKLIST - NSMEDADMRECREV_PSY_ALL_CORE
n/a

## 2024-03-07 NOTE — ECT AMBULATORY DISCHARGE PLAN - NSPOSTECTWORSEPSYCHSX_PSY_ALL_CORE
If you are experiencing heightened or worsening psychological symptoms please contact your private psychiatrist.

## 2024-03-07 NOTE — ECT TREATMENT NOTE - NSECTFOCPEHEART_PSY_ALL_CORE
Unremarkable

## 2024-03-07 NOTE — ECT PRE-PROCEDURE CHECKLIST - LAST TOOK
solids
clears
solids
clears
solids
clears
clears
solids
clears
solids
clears
solids
solids
clears

## 2024-03-07 NOTE — ECT AMBULATORY DISCHARGE PLAN - NSPOSTECTMEDCLEARANCE_PSY_ALL_CORE
20-Mar-2024
16-Feb-2023
25-Dec-2023
20-Mar-2024
01-May-2023
17-Jul-2023
15-Sep-2022
15-Sep-2022
05-Oct-2023
01-May-2023
05-Dec-2022

## 2024-03-07 NOTE — ECT PRE-PROCEDURE CHECKLIST - NS PRO AD ANY ON CHART
see note attatched/No

## 2024-03-07 NOTE — ECT PRE-PROCEDURE CHECKLIST - PRO INTERPRETER NEED 2
English

## 2024-03-07 NOTE — ECT AMBULATORY DISCHARGE PLAN - NSDCPNDISPN_GEN_ALL_CORE
Education provided on the pain management plan of care

## 2024-03-07 NOTE — ECT PRE-PROCEDURE CHECKLIST - NSPROEDALEARNER_GEN_A_NUR
spouse

## 2024-03-07 NOTE — ECT PRE-PROCEDURE CHECKLIST - NSMEDIMPLDEVICE_PSY_ALL_CORE
Other (please specify)

## 2024-03-07 NOTE — ECT PRE-PROCEDURE CHECKLIST - NSPROPTRIGHTBILLOFRIGHTS_GEN_A_NUR
patient

## 2024-03-07 NOTE — ECT PRE-PROCEDURE CHECKLIST - HOW PATIENT ADDRESSED, PROFILE
Lisette
iLsette
Lisette

## 2024-03-07 NOTE — ECT TREATMENT NOTE - NSECTCOGNTOTALTOKENFT_PSY_ALL_CORE
9  
8  
9  
8  
7  
7  
8  
9  
8  
9  
10  
8  
10  
9  
7  
8  
8  
7  
9  
9  
10  
7  
8

## 2024-03-07 NOTE — ECT PRE-PROCEDURE CHECKLIST - DOES PATIENT HAVE ADVANCE DIRECTIVE
Will bring next time/No
Yes
Will bring next time/No
Yes
Will bring next time/No
Will bring next time/No
Yes
Will bring next time/No
Yes
Will bring next time/No
Will bring next time/No
Yes
Will bring next time/No
Will bring next time/No
Yes
Will bring next time/No
Yes
Yes

## 2024-03-07 NOTE — ECT TREATMENT NOTE - NSECTABDMETACOMMENT_PSY_ALL_CORE
DM, HLD

## 2024-03-07 NOTE — ECT PRE-PROCEDURE CHECKLIST - NSADULTACCOMPRELATION_PSY_ALL_CORE
spouse
caregiver
spouse

## 2024-03-07 NOTE — ECT PRE-PROCEDURE CHECKLIST - HAND OFF
Holding RN to OR RN
Unit RN to OR RN
Holding RN to OR RN
Holding RN to OR RN
Unit RN to OR RN
Holding RN to OR RN
Unit RN to OR RN
Holding RN to OR RN

## 2024-03-07 NOTE — ECT PRE-PROCEDURE CHECKLIST - NSPTPHYSICALIMPAIR_PSY_ALL_CORE
no
unsteady gait, ambulating with walker/yes (specify)
no
no
unsteady gait, ambulating with walker/yes (specify)
unsteady gait, ambulating with walker/yes (specify)
no
no
unsteady gait, ambulating with walker/yes (specify)
no
unsteady gait, ambulating with walker/yes (specify)
no
unsteady gait, ambulating with walker/yes (specify)
no
no
unsteady gait, ambulating with walker/yes (specify)
no
unsteady gait, ambulating with walker/yes (specify)
unsteady gait, ambulating with walker/yes (specify)

## 2024-03-07 NOTE — ECT PRE-PROCEDURE CHECKLIST - PATIENT'S PREFERRED PRONOUN
Her/She

## 2024-03-07 NOTE — ECT PRE-PROCEDURE CHECKLIST - NSPTSENTTO_PSY_ALL_CORE
procedural room

## 2024-03-07 NOTE — ECT TREATMENT NOTE - TEMPERATURE IN CELSIUS (DEGREES C)
36.6
36.8
36.8
37.3
36.9
36.8
37.1
36.8
36.7
36.6
37.1
36.8
36.6
37.2
36.8
36.8
36.6
36.9
36.6
36.8
36.5
36.8
36.8
36.7
37.1
36.8

## 2024-03-07 NOTE — ECT PRE-PROCEDURE CHECKLIST - NSPROEDALEARNPREF_GEN_A_NUR
verbal instruction/written material
verbal instruction
skill demonstration/verbal instruction/written material
verbal instruction/written material
skill demonstration/verbal instruction/written material
skill demonstration/verbal instruction/written material
verbal instruction/written material
skill demonstration/verbal instruction/written material
skill demonstration/verbal instruction/written material
verbal instruction/written material
skill demonstration/verbal instruction/written material
verbal instruction/written material
skill demonstration/verbal instruction/written material
skill demonstration/verbal instruction/written material
verbal instruction
verbal instruction/written material
skill demonstration/verbal instruction/written material
skill demonstration/verbal instruction/written material
verbal instruction

## 2024-03-07 NOTE — ECT PRE-PROCEDURE CHECKLIST - NSPTSENTVIA_PSY_ALL_CORE
ambulate
ambulate
stretcher
stretcher
ambulate
stretcher
stretcher
ambulate
stretcher
ambulate
stretcher
ambulate
stretcher
ambulate
stretcher
ambulate
stretcher
ambulate

## 2024-03-07 NOTE — ECT PRE-PROCEDURE CHECKLIST - NSADULTACCOMPNAME_PSY_ALL_CORE
Jeffery Albright  ()
Jeffery/
Jeffery Albright  ()
Jeffery Albright .
Jeffery/
Jeffery Albright  ()
Jeffery Albright .
Lakshmi/edwar
Jeffery Albright .
Jeffery Albright .
Jeffery Albright  ()
Jeffery Albright .
Jeffery Albright .
Jeffery Albright  ()
Jeffery Albright .
Jeffery Albright .
Jeffery Albright  ()
Jeffery Albright  ()
Jeffery Albright .
Jeffery Albright .
Jeffery Albright  ()
Jeffery/
Jeffery Albright  ()
Jeffery Albright .
Jeffery Albright .

## 2024-03-07 NOTE — ECT PRE-PROCEDURE CHECKLIST - NSTRANSFEREYEGLASSESPAIRS_GEN_A_NUR
1 pair

## 2024-03-07 NOTE — ECT AMBULATORY DISCHARGE PLAN - NSPOSTECTSYMPTOMS_PSY_ALL_CORE
Excessive Diarrhea/Fever/Inability to tolerate liquids or foods/Increased irritability or sluggishness/Nausea and vomiting that does not stop/Pain not relieved by medications/Unable to urinate
Congruent

## 2024-03-07 NOTE — ECT AMBULATORY DISCHARGE PLAN - NSDPACMPNY_GEN_ALL_CORE
Spouse
Spouse
Caregiver
Spouse
Caregiver
Spouse
Family
Spouse

## 2024-03-07 NOTE — ECT AMBULATORY DISCHARGE PLAN - NSPOSTECTDIET_PSY_ALL_CORE
Gradually resume your regular diet/Increase fluids

## 2024-03-07 NOTE — ECT AMBULATORY DISCHARGE PLAN - NURSING SECTION COMPLETE
Patient/Caregiver provided printed discharge information.

## 2024-03-07 NOTE — ECT TREATMENT NOTE - NSICDXBHPRIMARYDX_PSY_ALL_CORE
Major depressive disorder   F32.9  

## 2024-03-07 NOTE — ECT TREATMENT NOTE - NSSUICPROTFACT_PSY_ALL_CORE
Responsibility to children, family, or others/Identifies reasons for living

## 2024-03-07 NOTE — ECT AMBULATORY DISCHARGE PLAN - NSDCPEFALRISK_GEN_ALL_CORE
For information on Fall & Injury Prevention, visit: [email or web address] OR  [email or web address] OR  https://www.cdc.gov/steadi/patient.html

## 2024-03-07 NOTE — ECT PRE-PROCEDURE CHECKLIST - PATIENT'S GENDER IDENTITY
Female

## 2024-03-07 NOTE — ECT PRE-PROCEDURE CHECKLIST - INV PERIPH IV DEVICE
Angiocath

## 2024-03-07 NOTE — ECT AMBULATORY DISCHARGE PLAN - NSTRANSFEREYEGLASSESPAIRS_GEN_A_NUR
1 pair

## 2024-03-07 NOTE — ECT PRE-PROCEDURE CHECKLIST - TEMPERATURE IN CELSIUS (DEGREES C)
36.7
36.8
36.6
36.7
37.2
36.8
36.8
20
36.8
36.5
37.1
36.6
36.8
36.9
36.8
36.6
37.3
36.8
36.9
37.1
37.1
36.8

## 2024-03-07 NOTE — ECT PRE-PROCEDURE CHECKLIST - NSMEDSDOSETAKEN_PSY_ALL_CORE
None

## 2024-03-07 NOTE — ECT AMBULATORY DISCHARGE PLAN - NSPOSTECTRESTRIC_PSY_ALL_CORE
Drive a car, operate power tools or machinery./Drink alcohol, beer, or wine./Make important personal and business decisions./If you have had any type of sedation, you may experience lightheadedness, dizziness, or sleepiness following your procedure.  A responsible adult should stay with you for at least 24 hours following your procedure.

## 2024-03-07 NOTE — ECT AMBULATORY DISCHARGE PLAN - NSPROEXTENSOFSELF_PSY_ALL_CORE
eyeglasses
none
eyeglasses
none
eyeglasses
eyeglasses
eyeglasses/walker
eyeglasses
walker/eyeglasses
eyeglasses
none
eyeglasses
eyeglasses

## 2024-03-07 NOTE — ECT AMBULATORY DISCHARGE PLAN - NSPOSTECTPOSSCOMP_PSY_ALL_CORE
Headache, nausea, general body aches are common experiences after this treatment.  These may be treated with over-the-counter pain medication.

## 2024-03-07 NOTE — ECT PRE-PROCEDURE CHECKLIST - COMMUNICATION BARRIER
none

## 2024-03-07 NOTE — ECT TREATMENT NOTE - NS_RISKASSESSMENTINTER_PSY_ALL_CORE
Low Acute Suicide Risk

## 2024-03-07 NOTE — ECT PRE-PROCEDURE CHECKLIST - NS PRO AD PATIENT TYPE
pt unsure which she has.  Pt directed to present a  made aware to bring a copy next treatment/Health Care Proxy (HCP)/Do Not Resuscitate (DNR)
pt unsure which she has.  Pt directed to present a copy next visit to ECT. 3/23/23
pt unsure which she has.  Pt directed to present a copy next visit to ECT. 3/23/23
pt unsure which she has.  Pt directed to present a  made aware to bring a copy next treatment/Health Care Proxy (HCP)/Do Not Resuscitate (DNR)
pt unsure which she has.  Pt directed to present a copy next visit to ECT. 3/23/23
pt unsure which she has.  Pt directed to present a  made aware to bring a copy next treatment/Health Care Proxy (HCP)/Do Not Resuscitate (DNR)

## 2024-03-07 NOTE — ECT PRE-PROCEDURE CHECKLIST - NSPROEDALEARNPREFOTH_GEN_A_NUR
verbal instruction/written material
verbal instruction/written material
verbal instruction
verbal instruction/written material
verbal instruction
verbal instruction/written material
verbal instruction
verbal instruction/written material
verbal instruction
verbal instruction

## 2024-03-07 NOTE — ECT AMBULATORY DISCHARGE PLAN - PRO INTERPRETER NEED 2
English

## 2024-03-07 NOTE — ECT PRE-PROCEDURE CHECKLIST - NSMEDIMPLDEVICEOTHERFT_PSY_ALL_CORE
B/L Hip and B/L Knee replacement hx
artificial knees B/L artificial hips B/L
artificial knees B/L artificial hips B/L
B/L Hip and B/L Knee replacement hx
B/L Hip and B/L Knee replacement hx
artificial knees B/L artificial hips B/L
artificial knees B/L artificial hips B/L
B/L Hip and B/L Knee replacement hx
artificial knees B/L artificial hips B/L
B/L Hip and B/L Knee replacement hx

## 2024-03-07 NOTE — ECT TREATMENT NOTE - TEMPERATURE IN FAHRENHEIT (DEGREES F)
98.3
99
98.1
98.3
98.3
97.9
97.9
97.7
98.2
98.4
98.2
99.1
97.9
98.1
98.2
97.8
97.9
98.3
98.7
98.8
98.7
98.3
98.5

## 2024-03-07 NOTE — ECT PRE-PROCEDURE CHECKLIST - AS TEMP SITE
oral

## 2024-03-07 NOTE — ECT PRE-PROCEDURE CHECKLIST - TO WHOM
Procedure room RN 
procedural nurse
procedural nurse
Procedure room RN 
Procedure room RN 
procedural nurse
Procedure room RN 
procedural nurse
ABHINAV Anderson, RN
procedural nurse
procedural nurse
Procedure room RN 
RN to procedure room RN 
Procedure room RN 
RN to procedure room RN 
RN to procedure room RN 
Procedure room RN 
procedural nurse
procedural nurse
Procedure room RN 
procedural nurse
Procedure room RN 
procedural nurse

## 2024-03-07 NOTE — ECT PRE-PROCEDURE CHECKLIST - NSPROEDAABILITYLEARN_GEN_A_NUR
anxiety

## 2024-03-07 NOTE — ECT PRE-PROCEDURE CHECKLIST - NSDISPOFBELONG_PSY_ALL_CORE
given to procedural RN
given to procedural RN
remains with patient
given to procedural RN
remains with patient
given to procedural RN
not applicable
remains with patient
remains with patient
given to procedural RN
given to procedural RN
remains with patient
remains with patient
given to procedural RN
given to procedural RN
not applicable
not applicable
remains with patient
remains with patient
given to procedural RN
not applicable/given to procedural RN
given to procedural RN
given to procedural RN

## 2024-03-07 NOTE — ECT PRE-PROCEDURE CHECKLIST - SIDE RAILS UP
done
done
n/a
done
n/a
done
n/a
done

## 2024-03-07 NOTE — ECT PRE-PROCEDURE CHECKLIST - NSPROEDAREADYLEARN_GEN_A_NUR
anxiety
depression
anxiety
depression
depression
anxiety
anxiety
depression
anxiety
depression
anxiety
anxiety
depression
anxiety
anxiety
depression
depression
anxiety
anxiety
depression
anxiety
depression
depression
anxiety

## 2024-03-07 NOTE — ECT PRE-PROCEDURE CHECKLIST - TEMPERATURE IN FAHRENHEIT (DEGREES F)
98.3
98.2
98.1
97.7
98.2
98.1
98.3
98.3
97.9
98.2
98.3
97.8
98.8
98.4
97.9
97.9
98.3
98.7
98.5
98.3
99
97.9
99.1
98.7

## 2024-03-07 NOTE — ECT AMBULATORY DISCHARGE PLAN - NSDPDISTO_GEN_ALL_CORE
Home
Skilled Nursing Facility
Home

## 2024-03-07 NOTE — ECT PRE-PROCEDURE CHECKLIST - PROCEDURAL AREA
ECT

## 2024-03-07 NOTE — ECT PRE-PROCEDURE CHECKLIST - PATIENT'S SEXUAL ORIENTATION
Heterosexual

## 2024-03-07 NOTE — ECT PRE-PROCEDURE CHECKLIST - NSPROEXTENSOFSELF_PSY_ALL_CORE
none
eyeglasses
eyeglasses/walker
eyeglasses
eyeglasses
walker/eyeglasses
eyeglasses
eyeglasses/walker
eyeglasses
none
eyeglasses
none
eyeglasses

## 2024-03-07 NOTE — ECT AMBULATORY DISCHARGE PLAN - PATIENT PORTAL LINK FT
You can access the FollowMyHealth Patient Portal offered by [facility] by registering at the following website: [email or web address]. By joining [facility]’s FollowMyHealth portal, you will also be able to view your health information using other applications (apps) compatible with our system.

## 2024-03-07 NOTE — ECT PRE-PROCEDURE CHECKLIST - INV PERIPH IV SIZE
24 gauge
22 gauge
24 gauge
22 gauge
24 gauge
22 gauge
24 gauge
24 gauge
22 gauge

## 2024-03-07 NOTE — ECT TREATMENT NOTE - NSECTCARDIOCOMMENT_PSY_ALL_CORE
HTN

## 2024-04-04 NOTE — BH CANCELLATION/NO SHOW NOTE - NSTYPENOTE_PSY_ALL_CORE
Patient/Family called to cancel
Clinician cancelled appointment
Clinician cancelled appointment
Patient/Family called to cancel

## 2024-04-24 ENCOUNTER — APPOINTMENT (OUTPATIENT)
Dept: ORTHOPEDIC SURGERY | Facility: CLINIC | Age: 74
End: 2024-04-24
Payer: MEDICARE

## 2024-04-24 VITALS — HEIGHT: 64 IN | WEIGHT: 265 LBS | BODY MASS INDEX: 45.24 KG/M2

## 2024-04-24 DIAGNOSIS — Z86.39 PERSONAL HISTORY OF OTHER ENDOCRINE, NUTRITIONAL AND METABOLIC DISEASE: ICD-10-CM

## 2024-04-24 DIAGNOSIS — E78.00 PURE HYPERCHOLESTEROLEMIA, UNSPECIFIED: ICD-10-CM

## 2024-04-24 DIAGNOSIS — G35 MULTIPLE SCLEROSIS: ICD-10-CM

## 2024-04-24 DIAGNOSIS — F43.9 REACTION TO SEVERE STRESS, UNSPECIFIED: ICD-10-CM

## 2024-04-24 DIAGNOSIS — Z87.898 PERSONAL HISTORY OF OTHER SPECIFIED CONDITIONS: ICD-10-CM

## 2024-04-24 DIAGNOSIS — Z86.59 PERSONAL HISTORY OF OTHER MENTAL AND BEHAVIORAL DISORDERS: ICD-10-CM

## 2024-04-24 DIAGNOSIS — I10 ESSENTIAL (PRIMARY) HYPERTENSION: ICD-10-CM

## 2024-04-24 PROCEDURE — 99203 OFFICE O/P NEW LOW 30 MIN: CPT | Mod: 25

## 2024-04-24 PROCEDURE — 20552 NJX 1/MLT TRIGGER POINT 1/2: CPT

## 2024-04-24 PROCEDURE — J3490M: CUSTOM | Mod: NC

## 2024-04-24 RX ORDER — FLUTICASONE FUROATE, UMECLIDINIUM BROMIDE AND VILANTEROL TRIFENATATE 200; 62.5; 25 UG/1; UG/1; UG/1
200-62.5-25 POWDER RESPIRATORY (INHALATION)
Refills: 0 | Status: ACTIVE | COMMUNITY

## 2024-04-24 RX ORDER — ATORVASTATIN CALCIUM 20 MG/1
20 TABLET, FILM COATED ORAL
Refills: 0 | Status: ACTIVE | COMMUNITY

## 2024-04-24 RX ORDER — PEGINTERFERON BETA-1A 125 UG/.5ML
125 INJECTION, SOLUTION INTRAMUSCULAR
Refills: 0 | Status: ACTIVE | COMMUNITY

## 2024-04-24 RX ORDER — INTERFERON BETA-1A 44 UG/.5ML
INJECTION, SOLUTION SUBCUTANEOUS
Refills: 0 | Status: DISCONTINUED | COMMUNITY
End: 2024-04-24

## 2024-04-24 RX ORDER — FLUOXETINE HYDROCHLORIDE 40 MG/1
40 CAPSULE ORAL
Refills: 0 | Status: DISCONTINUED | COMMUNITY
End: 2024-04-24

## 2024-04-24 RX ORDER — FLUOXETINE HYDROCHLORIDE 40 MG/1
40 CAPSULE ORAL
Refills: 0 | Status: ACTIVE | COMMUNITY

## 2024-04-24 RX ORDER — QUETIAPINE FUMARATE 25 MG/1
25 TABLET ORAL
Refills: 0 | Status: ACTIVE | COMMUNITY

## 2024-04-24 RX ORDER — ENALAPRIL MALEATE 20 MG/1
20 TABLET ORAL
Refills: 0 | Status: DISCONTINUED | COMMUNITY
End: 2024-04-24

## 2024-04-24 RX ORDER — TRAMADOL HYDROCHLORIDE 50 MG/1
50 TABLET, COATED ORAL
Refills: 0 | Status: ACTIVE | COMMUNITY

## 2024-04-24 RX ORDER — CHOLESTYRAMINE 4 G/9G
4 POWDER, FOR SUSPENSION ORAL
Refills: 0 | Status: ACTIVE | COMMUNITY

## 2024-04-24 RX ORDER — METFORMIN HYDROCHLORIDE 625 MG/1
TABLET ORAL
Refills: 0 | Status: DISCONTINUED | COMMUNITY
End: 2024-04-24

## 2024-04-24 RX ORDER — TRAZODONE HYDROCHLORIDE 300 MG/1
TABLET ORAL
Refills: 0 | Status: DISCONTINUED | COMMUNITY
End: 2024-04-24

## 2024-04-24 RX ORDER — ROPINIROLE 3 MG/1
TABLET, FILM COATED ORAL
Refills: 0 | Status: DISCONTINUED | COMMUNITY
End: 2024-04-24

## 2024-04-24 RX ORDER — LISINOPRIL 20 MG/1
20 TABLET ORAL
Refills: 0 | Status: ACTIVE | COMMUNITY

## 2024-04-24 RX ORDER — MEMANTINE HYDROCHLORIDE 5 MG/1
TABLET ORAL
Refills: 0 | Status: DISCONTINUED | COMMUNITY
End: 2024-04-24

## 2024-04-24 RX ORDER — FLUOXETINE HCL 20 MG/5 ML
20 SOLUTION, ORAL ORAL
Refills: 0 | Status: ACTIVE | COMMUNITY

## 2024-04-24 RX ORDER — MONTELUKAST SODIUM 10 MG/1
10 TABLET, FILM COATED ORAL
Refills: 0 | Status: DISCONTINUED | COMMUNITY
End: 2024-04-24

## 2024-04-24 RX ORDER — RIVASTIGMINE TARTRATE 1.5 MG/1
1.5 CAPSULE ORAL
Refills: 0 | Status: ACTIVE | COMMUNITY

## 2024-04-24 RX ORDER — DONEPEZIL HYDROCHLORIDE 10 MG/1
TABLET, ORALLY DISINTEGRATING ORAL
Refills: 0 | Status: DISCONTINUED | COMMUNITY
End: 2024-04-24

## 2024-04-24 RX ORDER — METHIMAZOLE 5 MG/1
5 TABLET ORAL
Refills: 0 | Status: ACTIVE | COMMUNITY

## 2024-04-24 RX ORDER — LEVOCETIRIZINE DIHYDROCHLORIDE 5 MG/1
5 TABLET, FILM COATED ORAL
Refills: 0 | Status: DISCONTINUED | COMMUNITY
End: 2024-04-24

## 2024-04-24 RX ORDER — CHROMIUM 200 MCG
TABLET ORAL
Refills: 0 | Status: ACTIVE | COMMUNITY

## 2024-04-24 RX ORDER — BUPROPION HYDROCHLORIDE 100 MG/1
TABLET, FILM COATED ORAL
Refills: 0 | Status: DISCONTINUED | COMMUNITY
End: 2024-04-24

## 2024-04-27 NOTE — HISTORY OF PRESENT ILLNESS
[9] : 9 [Radiating] : radiating [Sharp] : sharp [Tightness] : tightness [Constant] : constant [Meds] : meds [Heat] : heat [Retired] : Work status: retired [de-identified] : 04/24/2024: Patient presenting today for a FUV. C/o neck pain, symptomatic for 2 months. Pt had a fall on 4/10/24 which may have exacerbated previously existing neck pains.  Pt is prone to falling. No pain, numbness, tingling or weakness in the upper or lower extremities b/l. No electrical sensation with cervical ROM. Seen at [place] ED on 4/14/24, CT scan done, and Tramadol given (no extended relief). OTC Bengay no extended relief. NSAIDs provide relief but are not long lasting. Pt has bladder stimulator placed but states it is not effective. Pt states her general health is normal.   [] : no [FreeTextEntry7] : lt chest pain [FreeTextEntry9] : advil, bengay [de-identified] : [facility] [de-identified] : ct c, t & l-sp[facility]

## 2024-04-27 NOTE — PHYSICAL EXAM
[Rotation to left] : rotation to left [Rotation to right] : rotation to right [Biceps 2+] : biceps 2+ [Triceps 2+] : triceps 2+ [Brachioradialis 2+] : brachioradialis 2+ [de-identified] : Constitutional: - General Appearance: Unremarkable Body Habitus Well Developed Well Nourished Body Habitus No Deformities Well Groomed Ability To communicate: Normal Neurologic: Global sensation is intact to upper and lower extremities. See examination of Neck and/or Spine for exceptions. Orientation to Time, Place and Person is: Normal Mood And Affect is Normal Skin: - Head/Face, Right Upper/Lower Extremity, Left Upper/Lower Extremity: Normal See Examination of Neck and/or Spine for exceptions Cardiovascular: Peripheral Cardiovascular System is Normal Palpation of Lymph Nodes: Normal Palpation of lymph nodes in: Axilla, Cervical, Inguinal Abnormal Palpation of lymph nodes in: None  [] : negative Gilmore reflex [FreeTextEntry8] : L>R paraspinals pains.  [FreeTextEntry9] : cannot extend past neutral.  [de-identified] : left lateral rotation 5 degrees [TWNoteComboBox6] : right lateral rotation 25 degrees

## 2024-04-27 NOTE — PROCEDURE
[FreeTextEntry3] :   Discussed risks, benefits, and alternatives as well as contents of injection. Risks include, but are not limited allergic reaction, flare reaction, injection site pain, bruising, numbness, increased blood sugar, skin discoloration, fat atrophy, tendon rupture, and infection. Patient understands and would like to proceed with injection.  The skin over the BL CERVICAL paraspinal was cleansed with Betadine and anesthetized with ethyl chloride. 1 cc 40mg of Kenalog and 4 cc of 0.5% bupivacaine were injected.  Site was dressed with a band-aid. Patient tolerated the procedure well.  Advised to use ice packs every 20 min for the next 24h.

## 2024-04-27 NOTE — DATA REVIEWED
[Report was reviewed and noted in the chart] : The report was reviewed and noted in the chart [FreeTextEntry1] : 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 thyroid nodule.   [facility]- grade 1 listhesis at L4-5 and L1-2 and 3 chronic compression deformity of L2. Severe rt fs t1-5. severe l45 stenosis.   [facility]- pelvis and hip ct scans- bl hip replacement.

## 2024-04-27 NOTE — DISCUSSION/SUMMARY
[de-identified] : 74 Y F with cervical spondylosis. Morbidly obese. Ambulating with rollator walker. Denies other medical problems. Pt is prone to falls, decreased cervical ROM, Significant cervical stiffness, refractory to NSAIDs and narcotic pain medication, I am requesting a cervical MRI to evaluate for stenosis.  Pt has bladder stimulator placed, states it is MRI compatible.   TPI given today in the BL cervical paraspinal. MDP and Cyclobenzaprine RX'd for symptom control.   Patient was provided with a referral for cervical physical therapy to work on stretching, strengthening and range of motion. Patient was provided with a cervical home exercise program.    F/U after images are complete.   Prior to appointment and during encounter with patient extensive medical records were reviewed including but not limited to, hospital records, out patient records, imaging results, and lab data. During this appointment the patient was examined, diagnoses were discussed and explained in a face to face manner. In addition extensive time was spent reviewing aforementioned diagnostic studies. Counseling including abnormal image results, differential diagnoses, treatment options, risk and benefits, lifestyle changes, current condition, and current medications was performed. Patient's comments, questions, and concerns were address and patient verbalized understanding. Based on this patient's presentation at our office, which is an orthopedic spine surgeon's office, this patient inherently / intrinsically has a risk, however minute, of developing issues such as Cauda equina syndrome, bowel and bladder changes, or progression of motor or neurological deficits such as paralysis which may be permanent.   I, Rozina Nelson, attest that this documentation has been prepared under the direction and in the presence of provider Jm Chu MD.

## 2024-04-29 ENCOUNTER — RESULT REVIEW (OUTPATIENT)
Age: 74
End: 2024-04-29

## 2024-05-16 NOTE — H&P ADULT - PROBLEM SELECTOR PROBLEM 1
Bed in lowest position, wheels locked, appropriate side rails in place/Call bell, personal items and telephone in reach/Instruct patient to call for assistance before getting out of bed or chair/Non-slip footwear when patient is out of bed/[place] to call system/Physically safe environment - no spills, clutter or unnecessary equipment/Purposeful Proactive Rounding/Room/bathroom lighting operational, light cord in reach
Severe episode of recurrent major depressive disorder, without psychotic features

## 2024-05-20 ENCOUNTER — APPOINTMENT (OUTPATIENT)
Dept: ORTHOPEDIC SURGERY | Facility: CLINIC | Age: 74
End: 2024-05-20
Payer: MEDICARE

## 2024-05-20 VITALS — WEIGHT: 265 LBS | HEIGHT: 64 IN | BODY MASS INDEX: 45.24 KG/M2

## 2024-05-20 PROCEDURE — 99214 OFFICE O/P EST MOD 30 MIN: CPT

## 2024-05-25 NOTE — DISCUSSION/SUMMARY
[de-identified] : 74 Y F with cervical spondylosis. Morbidly obese. Recommend consultation with interventional pain management to discuss spinal steroid injection and possibly cervical facet injections. No acute surgical indications at this time. Will follow up on prn basis.  Prior to appointment and during encounter with patient extensive medical records were reviewed including but not limited to, hospital records, out patient records, imaging results, and lab data. During this appointment the patient was examined, diagnoses were discussed and explained in a face to face manner. In addition extensive time was spent reviewing aforementioned diagnostic studies. Counseling including abnormal image results, differential diagnoses, treatment options, risk and benefits, lifestyle changes, current condition, and current medications was performed. Patient's comments, questions, and concerns were address and patient verbalized understanding. Based on this patient's presentation at our office, which is an orthopedic spine surgeon's office, this patient inherently / intrinsically has a risk, however minute, of developing issues such as Cauda equina syndrome, bowel and bladder changes, or progression of motor or neurological deficits such as paralysis which may be permanent.

## 2024-05-25 NOTE — PHYSICAL EXAM
[Rotation to left] : rotation to left [Rotation to right] : rotation to right [Biceps 2+] : biceps 2+ [Triceps 2+] : triceps 2+ [Brachioradialis 2+] : brachioradialis 2+ [de-identified] : Constitutional: - General Appearance: Unremarkable Body Habitus Well Developed Well Nourished Body Habitus No Deformities Well Groomed Ability To communicate: Normal Neurologic: Global sensation is intact to upper and lower extremities. See examination of Neck and/or Spine for exceptions. Orientation to Time, Place and Person is: Normal Mood And Affect is Normal Skin: - Head/Face, Right Upper/Lower Extremity, Left Upper/Lower Extremity: Normal See Examination of Neck and/or Spine for exceptions Cardiovascular: Peripheral Cardiovascular System is Normal Palpation of Lymph Nodes: Normal Palpation of lymph nodes in: Axilla, Cervical, Inguinal Abnormal Palpation of lymph nodes in: None  [] : negative Gilmore reflex [FreeTextEntry8] : L>R paraspinals pains.  [FreeTextEntry9] : cannot extend past neutral.  [de-identified] : left lateral rotation 5 degrees [TWNoteComboBox6] : right lateral rotation 25 degrees

## 2024-05-25 NOTE — HISTORY OF PRESENT ILLNESS
[9] : 9 [Radiating] : radiating [Sharp] : sharp [Tightness] : tightness [Constant] : constant [Meds] : meds [Heat] : heat [Retired] : Work status: retired [de-identified] : 05/20/2024: Patient returns to the office to review MRI. She reports only 6-10 hours relief of cervical symptoms following the cervical trigger point injections. Most of the pain is in the posterior cervical musculature without significant pain into the arms. She has had a few recent stumbles and falls, likely secondary to the MS  04/24/2024: Patient presenting today for a FUV. C/o neck pain, symptomatic for 2 months. Pt had a fall on 4/10/24 which may have exacerbated previously existing neck pains.  Pt is prone to falling. No pain, numbness, tingling or weakness in the upper or lower extremities b/l. No electrical sensation with cervical ROM. Seen at [place] ED on 4/14/24, CT scan done, and Tramadol given (no extended relief). OTC Bengay no extended relief. NSAIDs provide relief but are not long lasting. Pt has bladder stimulator placed but states it is not effective. Pt states her general health is normal.   [] : no [FreeTextEntry7] : lt chest pain [FreeTextEntry9] : advil, bengay [de-identified] : [facility] [de-identified] : ct c, t & l-spine [facility]

## 2024-05-25 NOTE — DATA REVIEWED
[Report was reviewed and noted in the chart] : The report was reviewed and noted in the chart [FreeTextEntry1] : MRI cervical spine 04/24/24 [facility] Multiple cervical spondylosis No significant spinal cord compression Multiple level bilateral stenosis and facet arthropathy.  No significant changes in comparison to prior MRI 2020  [facility] [place] REPORT ONLY 4/15/24- No fxs, arthritic changes of C3-4 C5-6 C6-7 mod central stenosis and mod-severe fs. thyroid nodule.   [facility] [place] CT scan- grade 1 listhesis at L4-5 and L1-2 and 3 chronic compression deformity of L2. Severe rt fs t1-5. severe l45 stenosis.   [facility] [place] and hip ct scans- bl hip replacement.

## 2024-06-04 ENCOUNTER — APPOINTMENT (OUTPATIENT)
Dept: PAIN MANAGEMENT | Facility: CLINIC | Age: 74
End: 2024-06-04
Payer: MEDICARE

## 2024-06-04 VITALS — HEIGHT: 64 IN | WEIGHT: 270 LBS | BODY MASS INDEX: 46.1 KG/M2

## 2024-06-04 DIAGNOSIS — M79.18 MYALGIA, OTHER SITE: ICD-10-CM

## 2024-06-04 DIAGNOSIS — M47.812 SPONDYLOSIS W/OUT MYELOPATHY OR RADICULOPATHY, CERVICAL REGION: ICD-10-CM

## 2024-06-04 DIAGNOSIS — M54.12 RADICULOPATHY, CERVICAL REGION: ICD-10-CM

## 2024-06-04 PROCEDURE — 99204 OFFICE O/P NEW MOD 45 MIN: CPT

## 2024-06-04 RX ORDER — METHYLPREDNISOLONE 4 MG/1
4 TABLET ORAL
Qty: 1 | Refills: 0 | Status: DISCONTINUED | COMMUNITY
Start: 2024-04-24 | End: 2024-06-04

## 2024-06-04 RX ORDER — CYCLOBENZAPRINE HYDROCHLORIDE 10 MG/1
10 TABLET, FILM COATED ORAL 3 TIMES DAILY
Qty: 60 | Refills: 0 | Status: DISCONTINUED | COMMUNITY
Start: 2024-04-24 | End: 2024-06-04

## 2024-06-04 RX ORDER — METHOCARBAMOL 750 MG/1
750 TABLET, FILM COATED ORAL TWICE DAILY
Qty: 60 | Refills: 2 | Status: ACTIVE | COMMUNITY
Start: 2024-06-04 | End: 1900-01-01

## 2024-06-04 NOTE — PHYSICAL EXAM
[de-identified] : Constitutional:   - No acute distress   - Obese  Neurological:   - normal mood and affect   - alert and oriented x 3     Cardiovascular:   - grossly normal   Cervical Spine Exam:   Inspection:   erythema (-)   ecchymosis (-)   rashes (-)    Palpation:                                                    Cervical paraspinal tenderness:         R (-); L (-)  Upper trapezius tenderness:               R (+); L (+)  Rhomboids tenderness:                      R (-); L (-)  Occipital Ridge:                                   R (-); L (-)  Supraspinatus tenderness:                 R (+); L (+)   ROM: Restricted all planes  pain throughout ROM testing  Strength Testing:              Deltoid                           R (5/5); L (5/5)  Biceps:                          R (5/5); L (5/5)  Triceps:                         R (5/5); L (5/5)  Finger Abductors:         R (5/5); L (5/5)  Grasp:                           R (4+/5); L (5/5)   Special Testing:  Spurling Test:                  R (=); L (-)  Facet load test:               R (+); L (+)   Neuro:  SILT throughout right upper extremity  SILT throughout left upper extremity   Reflexes:  Biceps   -           R (2+); L (2+)  Triceps  -           R (2+); L (2+)  Brachioradialis- R (2+); L (2+)     No ankle clonus  Reflexes normal and symmetric bilateral lower extremities   Gait: antalgic gait ambulates with assistance of rolling walker transfer training/bed mobility training/gait training

## 2024-06-04 NOTE — ASSESSMENT
[FreeTextEntry1] : A discussion regarding available pain management treatment options occurred with the patient.  These included interventional, rehabilitative, pharmacological, and alternative modalities. We will proceed with the following:    Interventional treatment options:   - Proceed with right PM C7-T1 MAURILIO with fluoroscopic guidance - ASC secondary to medical comorbidities - Will obtain medical clearance/optimization to move forward with indicated procedure - May consider cervical facet directed intervention for ongoing axial neck pain; not discussed - see additional instructions below    Rehabilitative options: - continue physical therapy   - participation in active HEP was discussed    Medication based treatment options:   - initiate trial of methocarbamol 750 mg up to BID as needed - continue ibuprofen 400-600 mg up to TID as needed - see additional instructions below    Complementary treatment options:   - Weight management and lifestyle modifications discussed   - Continue TENS therapy  Additional treatment recommendations as follows:   - patient will follow-up with Dr. Chu as directed - Follow up 1-2 weeks post injection for assessment of efficacy and further treatment recommendations  The risks, benefits and alternatives of the proposed procedure were explained in detail with the patient. The risks outlined include but are not limited to infection, bleeding, post- dural puncture headache, nerve injury, a temporary increase in pain, failure to resolve symptoms, need for future interventions, allergic reaction, and possible elevation of blood sugar in diabetics if using corticosteroid.  All questions were answered to patient's apparent satisfaction, and he/she verbalized an understanding.  We have discussed the risks, benefits, and alternatives for NSAID therapy including but not limited to the risk of bleeding, thrombosis, gastric mucosal irritation/ulceration, allergic reaction and kidney dysfunction.  The patient verbalizes an understanding.  The documentation recorded by the scribe, in my presence, accurately reflects the service I personally performed and the decisions made by me with my edits as appropriate.   I, Beni Justice acting as scribe, attest that this documentation has been prepared under the direction and in the presence of Provider Paulino Lopez DO.

## 2024-06-04 NOTE — HISTORY OF PRESENT ILLNESS
[Neck] : neck [Right Arm] : right arm [9] : 9 [3] : 3 [Radiating] : radiating [Sharp] : sharp [Tightness] : tightness [Constant] : constant [Meds] : meds [Heat] : heat [Retired] : Work status: retired [FreeTextEntry1] : The patient presents for initial evaluation regarding their neck pain. Patient was referred by Dr. Chu.  Patient is accompanied by her  and health aide who assist with her history.  Patient with a history of MS.  Patient suffered a fall on 4/10/2024 which worsened her neck pain.  Her pain is in the neck with radiation to the right shoulder and right wrist.  Patient is in formal PT twice a week, takes cyclobenzaprine and Advil, tried a MDP, and has had TPI for conservative treatment.  Subjective Weakness: No  Numbness/Tingling: No  Bladder/Bowel dysfunction: No  Gait Abnormalities: No  Fine motor coordination changes: No   Injections: Yes TPI  Pertinent Surgical History: N/A   Imagin) MRI Cervical Spine (2024) -[facility] There is straightening of the cervical lordosis. There is anterior subluxation C7/T1, not appreciably changed. There is also minimal anterior subluxation C5/6 There is no fracture or suspicious marrow signal abnormality. C2-3: There is left greater than right facet arthropathy. There is no disc herniation, significant central canal or neural foraminal stenosis. The appearance of this disc space is not appreciably changed. C3-4: There is marked loss of disc space height with ventral and predominantly right-sided disc/ridge complex. In combination with facet arthropathy there is severe right foraminal stenosis and right C4 nerve root impingement. There is lesser left foraminal encroachment. There is no adria cord compression. There is minimal spinal stenosis. Similar findings are seen on the prior exam. C4-5: There is ventral and bilateral disc/ridge complex with significant bilateral foraminal stenosis. There is right greater than left facet arthropathy contributing to foraminal compromise. There is suspected bilateral C5 nerve root impingement. There is mild cord impingement and spinal stenosis. Findings are not appreciably changed. C5-6: There is bilateral disc/ridge complex and facet arthropathy with encroachment upon the neural foramina bilaterally, left greater than right, with suspected left C6 nerve root impingement. There is no cord compression or significant spinal stenosis. Similar findings are seen on the prior exam. C6-7: There is a moderate to large ventral and bilateral disc/ridge complex with mild cord impingement and spinal stenosis. There is left greater than right foraminal stenosis and suspected bilateral C7 nerve root impingement. Findings are not appreciably changed. C7-T1: There is a small central disc herniation. There is encroachment upon the left neural foramen. There is no cord compression or significant spinal stenosis. Similar findings are seen on the prior exam.   Physician Disclaimer: I have personally reviewed and confirmed all HPI data with the patient.  [] : no [FreeTextEntry7] : lt chest pain [FreeTextEntry9] : advil, bengay [de-identified] : [facility] [de-identified] : ct c, t & l-spine done at [facility]

## 2024-06-17 NOTE — ECT OUTPATIENT PROGRAM DISCHARGE SUMMARY - NSECTTREATMENTSUMMARY_PSY_ALL_CORE
The patient underwent a grand total of 33 RUL treatments in this series, starting at [facility] and continuing as an outpatient. At her last visit her [facility] cognition score was 7/10; she was noted to have some memory deficits which were chronic and worsening gradually. She was deemed stable on monthly maintenance treatment. Family then called to notify the ECT team that she was withdrawing from treatment. Discharged in stable condition.

## 2024-07-23 ENCOUNTER — APPOINTMENT (OUTPATIENT)
Dept: ORTHOPEDIC SURGERY | Facility: AMBULATORY SURGERY CENTER | Age: 74
End: 2024-07-23
Payer: MEDICARE

## 2024-07-23 PROCEDURE — 62321 NJX INTERLAMINAR CRV/THRC: CPT

## 2024-08-12 ENCOUNTER — APPOINTMENT (OUTPATIENT)
Dept: PAIN MANAGEMENT | Facility: CLINIC | Age: 74
End: 2024-08-12

## 2024-09-06 ENCOUNTER — RX RENEWAL (OUTPATIENT)
Age: 74
End: 2024-09-06

## 2025-05-22 NOTE — DISCHARGE NOTE PROVIDER - CARE PROVIDER_API CALL
Primary Care Provider,   Phone: (   )    -  Fax: (   )    -  Established Patient  Follow Up Time: 1 week   Never smoker